# Patient Record
Sex: MALE | Race: WHITE | NOT HISPANIC OR LATINO | URBAN - METROPOLITAN AREA
[De-identification: names, ages, dates, MRNs, and addresses within clinical notes are randomized per-mention and may not be internally consistent; named-entity substitution may affect disease eponyms.]

---

## 2018-11-23 ENCOUNTER — INPATIENT (INPATIENT)
Facility: HOSPITAL | Age: 58
LOS: 19 days | Discharge: NURSING, REHAB OR LONG-TERM CARE | End: 2018-12-13
Attending: INTERNAL MEDICINE | Admitting: INTERNAL MEDICINE
Payer: MEDICARE

## 2018-11-23 VITALS — OXYGEN SATURATION: 100 % | HEART RATE: 113 BPM

## 2018-11-23 DIAGNOSIS — I26.99 OTHER PULMONARY EMBOLISM WITHOUT ACUTE COR PULMONALE: ICD-10-CM

## 2018-11-23 DIAGNOSIS — R09.89 OTHER SPECIFIED SYMPTOMS AND SIGNS INVOLVING THE CIRCULATORY AND RESPIRATORY SYSTEMS: ICD-10-CM

## 2018-11-23 LAB
ALBUMIN SERPL ELPH-MCNC: 3.6 G/DL — SIGNIFICANT CHANGE UP (ref 3.5–5.2)
ALBUMIN SERPL ELPH-MCNC: 3.6 G/DL — SIGNIFICANT CHANGE UP (ref 3.5–5.2)
ALBUMIN SERPL ELPH-MCNC: 4.4 G/DL — SIGNIFICANT CHANGE UP (ref 3.5–5.2)
ALP SERPL-CCNC: 159 U/L — HIGH (ref 30–115)
ALP SERPL-CCNC: 160 U/L — HIGH (ref 30–115)
ALP SERPL-CCNC: 203 U/L — HIGH (ref 30–115)
ALT FLD-CCNC: 18 U/L — SIGNIFICANT CHANGE UP (ref 0–41)
ALT FLD-CCNC: 57 U/L — HIGH (ref 0–41)
ALT FLD-CCNC: 66 U/L — HIGH (ref 0–41)
ANION GAP SERPL CALC-SCNC: 20 MMOL/L — HIGH (ref 7–14)
ANION GAP SERPL CALC-SCNC: 20 MMOL/L — HIGH (ref 7–14)
ANION GAP SERPL CALC-SCNC: 21 MMOL/L — HIGH (ref 7–14)
ANION GAP SERPL CALC-SCNC: 37 MMOL/L — HIGH (ref 7–14)
APAP SERPL-MCNC: <5 UG/ML — LOW (ref 10–30)
APPEARANCE UR: ABNORMAL
APTT BLD: 39.5 SEC — HIGH (ref 27–39.2)
APTT BLD: 63.2 SEC — HIGH (ref 27–39.2)
APTT BLD: 66.5 SEC — HIGH (ref 27–39.2)
AST SERPL-CCNC: 33 U/L — SIGNIFICANT CHANGE UP (ref 0–41)
AST SERPL-CCNC: 58 U/L — HIGH (ref 0–41)
AST SERPL-CCNC: 72 U/L — HIGH (ref 0–41)
BASE EXCESS BLDA CALC-SCNC: -14 MMOL/L — LOW (ref -2–2)
BASOPHILS # BLD AUTO: 0 K/UL — SIGNIFICANT CHANGE UP (ref 0–0.2)
BASOPHILS # BLD AUTO: 0.04 K/UL — SIGNIFICANT CHANGE UP (ref 0–0.2)
BASOPHILS # BLD AUTO: 0.04 K/UL — SIGNIFICANT CHANGE UP (ref 0–0.2)
BASOPHILS NFR BLD AUTO: 0 % — SIGNIFICANT CHANGE UP (ref 0–1)
BASOPHILS NFR BLD AUTO: 0.1 % — SIGNIFICANT CHANGE UP (ref 0–1)
BASOPHILS NFR BLD AUTO: 0.1 % — SIGNIFICANT CHANGE UP (ref 0–1)
BILIRUB DIRECT SERPL-MCNC: <0.2 MG/DL — SIGNIFICANT CHANGE UP (ref 0–0.2)
BILIRUB INDIRECT FLD-MCNC: >0.2 MG/DL — SIGNIFICANT CHANGE UP (ref 0.2–1.2)
BILIRUB SERPL-MCNC: 0.3 MG/DL — SIGNIFICANT CHANGE UP (ref 0.2–1.2)
BILIRUB SERPL-MCNC: 0.4 MG/DL — SIGNIFICANT CHANGE UP (ref 0.2–1.2)
BILIRUB SERPL-MCNC: 0.7 MG/DL — SIGNIFICANT CHANGE UP (ref 0.2–1.2)
BILIRUB UR-MCNC: NEGATIVE — SIGNIFICANT CHANGE UP
BUN SERPL-MCNC: 40 MG/DL — HIGH (ref 10–20)
BUN SERPL-MCNC: 42 MG/DL — HIGH (ref 10–20)
BUN SERPL-MCNC: 45 MG/DL — HIGH (ref 10–20)
BUN SERPL-MCNC: 45 MG/DL — HIGH (ref 10–20)
CALCIUM SERPL-MCNC: 10.2 MG/DL — HIGH (ref 8.5–10.1)
CALCIUM SERPL-MCNC: 8.7 MG/DL — SIGNIFICANT CHANGE UP (ref 8.5–10.1)
CALCIUM SERPL-MCNC: 8.8 MG/DL — SIGNIFICANT CHANGE UP (ref 8.5–10.1)
CALCIUM SERPL-MCNC: 8.8 MG/DL — SIGNIFICANT CHANGE UP (ref 8.5–10.1)
CHLORIDE SERPL-SCNC: 85 MMOL/L — LOW (ref 98–110)
CHLORIDE SERPL-SCNC: 91 MMOL/L — LOW (ref 98–110)
CHLORIDE SERPL-SCNC: 92 MMOL/L — LOW (ref 98–110)
CHLORIDE SERPL-SCNC: 93 MMOL/L — LOW (ref 98–110)
CK MB CFR SERPL CALC: 2.1 NG/ML — SIGNIFICANT CHANGE UP (ref 0.6–6.3)
CK SERPL-CCNC: 94 U/L — SIGNIFICANT CHANGE UP (ref 0–225)
CO2 SERPL-SCNC: 10 MMOL/L — LOW (ref 17–32)
CO2 SERPL-SCNC: 20 MMOL/L — SIGNIFICANT CHANGE UP (ref 17–32)
CO2 SERPL-SCNC: 21 MMOL/L — SIGNIFICANT CHANGE UP (ref 17–32)
CO2 SERPL-SCNC: 22 MMOL/L — SIGNIFICANT CHANGE UP (ref 17–32)
COLOR SPEC: SIGNIFICANT CHANGE UP
CREAT SERPL-MCNC: 2.3 MG/DL — HIGH (ref 0.7–1.5)
CREAT SERPL-MCNC: 2.4 MG/DL — HIGH (ref 0.7–1.5)
CREAT SERPL-MCNC: 2.4 MG/DL — HIGH (ref 0.7–1.5)
CREAT SERPL-MCNC: 3.1 MG/DL — HIGH (ref 0.7–1.5)
DIFF PNL FLD: ABNORMAL
EOSINOPHIL # BLD AUTO: 0 K/UL — SIGNIFICANT CHANGE UP (ref 0–0.7)
EOSINOPHIL # BLD AUTO: 0 K/UL — SIGNIFICANT CHANGE UP (ref 0–0.7)
EOSINOPHIL # BLD AUTO: 0.28 K/UL — SIGNIFICANT CHANGE UP (ref 0–0.7)
EOSINOPHIL NFR BLD AUTO: 0 % — SIGNIFICANT CHANGE UP (ref 0–8)
EOSINOPHIL NFR BLD AUTO: 0 % — SIGNIFICANT CHANGE UP (ref 0–8)
EOSINOPHIL NFR BLD AUTO: 0.9 % — SIGNIFICANT CHANGE UP (ref 0–8)
ETHANOL SERPL-MCNC: <10 MG/DL — HIGH
FIBRINOGEN PPP-MCNC: 492 MG/DL — SIGNIFICANT CHANGE UP (ref 204.4–570.6)
GAS PNL BLDA: SIGNIFICANT CHANGE UP
GAS PNL BLDV: SIGNIFICANT CHANGE UP
GLUCOSE BLDC GLUCOMTR-MCNC: 148 MG/DL — HIGH (ref 70–99)
GLUCOSE BLDC GLUCOMTR-MCNC: 158 MG/DL — HIGH (ref 70–99)
GLUCOSE BLDC GLUCOMTR-MCNC: 235 MG/DL — HIGH (ref 70–99)
GLUCOSE SERPL-MCNC: 106 MG/DL — HIGH (ref 70–99)
GLUCOSE SERPL-MCNC: 108 MG/DL — HIGH (ref 70–99)
GLUCOSE SERPL-MCNC: 128 MG/DL — HIGH (ref 70–99)
GLUCOSE SERPL-MCNC: 321 MG/DL — HIGH (ref 70–99)
GLUCOSE UR QL: NEGATIVE — SIGNIFICANT CHANGE UP
HCO3 BLDA-SCNC: 14 MMOL/L — LOW (ref 23–27)
HCT VFR BLD CALC: 31.8 % — LOW (ref 42–52)
HCT VFR BLD CALC: 32.5 % — LOW (ref 42–52)
HCT VFR BLD CALC: 33.1 % — LOW (ref 42–52)
HCT VFR BLD CALC: 41.8 % — LOW (ref 42–52)
HGB BLD-MCNC: 10.7 G/DL — LOW (ref 14–18)
HGB BLD-MCNC: 10.9 G/DL — LOW (ref 14–18)
HGB BLD-MCNC: 11.1 G/DL — LOW (ref 14–18)
HGB BLD-MCNC: 13.2 G/DL — LOW (ref 14–18)
IMM GRANULOCYTES NFR BLD AUTO: 1 % — HIGH (ref 0.1–0.3)
IMM GRANULOCYTES NFR BLD AUTO: 1.2 % — HIGH (ref 0.1–0.3)
INR BLD: 1.23 RATIO — SIGNIFICANT CHANGE UP (ref 0.65–1.3)
KETONES UR-MCNC: ABNORMAL
LACTATE SERPL-SCNC: 3.3 MMOL/L — HIGH (ref 0.5–2.2)
LEUKOCYTE ESTERASE UR-ACNC: NEGATIVE — SIGNIFICANT CHANGE UP
LIDOCAIN IGE QN: 11 U/L — SIGNIFICANT CHANGE UP (ref 7–60)
LYMPHOCYTES # BLD AUTO: 0.28 K/UL — LOW (ref 1.2–3.4)
LYMPHOCYTES # BLD AUTO: 0.9 % — LOW (ref 20.5–51.1)
LYMPHOCYTES # BLD AUTO: 10.3 % — LOW (ref 20.5–51.1)
LYMPHOCYTES # BLD AUTO: 2.04 K/UL — SIGNIFICANT CHANGE UP (ref 1.2–3.4)
LYMPHOCYTES # BLD AUTO: 2.77 K/UL — SIGNIFICANT CHANGE UP (ref 1.2–3.4)
LYMPHOCYTES # BLD AUTO: 7.4 % — LOW (ref 20.5–51.1)
MAGNESIUM SERPL-MCNC: 2.1 MG/DL — SIGNIFICANT CHANGE UP (ref 1.8–2.4)
MAGNESIUM SERPL-MCNC: 2.8 MG/DL — HIGH (ref 1.8–2.4)
MCHC RBC-ENTMCNC: 29.6 PG — SIGNIFICANT CHANGE UP (ref 27–31)
MCHC RBC-ENTMCNC: 29.7 PG — SIGNIFICANT CHANGE UP (ref 27–31)
MCHC RBC-ENTMCNC: 29.8 PG — SIGNIFICANT CHANGE UP (ref 27–31)
MCHC RBC-ENTMCNC: 29.8 PG — SIGNIFICANT CHANGE UP (ref 27–31)
MCHC RBC-ENTMCNC: 31.6 G/DL — LOW (ref 32–37)
MCHC RBC-ENTMCNC: 33.5 G/DL — SIGNIFICANT CHANGE UP (ref 32–37)
MCHC RBC-ENTMCNC: 33.5 G/DL — SIGNIFICANT CHANGE UP (ref 32–37)
MCHC RBC-ENTMCNC: 33.6 G/DL — SIGNIFICANT CHANGE UP (ref 32–37)
MCV RBC AUTO: 88.1 FL — SIGNIFICANT CHANGE UP (ref 80–94)
MCV RBC AUTO: 88.8 FL — SIGNIFICANT CHANGE UP (ref 80–94)
MCV RBC AUTO: 89 FL — SIGNIFICANT CHANGE UP (ref 80–94)
MCV RBC AUTO: 93.9 FL — SIGNIFICANT CHANGE UP (ref 80–94)
MONOCYTES # BLD AUTO: 1.07 K/UL — HIGH (ref 0.1–0.6)
MONOCYTES # BLD AUTO: 2.61 K/UL — HIGH (ref 0.1–0.6)
MONOCYTES # BLD AUTO: 2.73 K/UL — HIGH (ref 0.1–0.6)
MONOCYTES NFR BLD AUTO: 10.2 % — HIGH (ref 1.7–9.3)
MONOCYTES NFR BLD AUTO: 3.4 % — SIGNIFICANT CHANGE UP (ref 1.7–9.3)
MONOCYTES NFR BLD AUTO: 9.5 % — HIGH (ref 1.7–9.3)
NEUTROPHILS # BLD AUTO: 21.05 K/UL — HIGH (ref 1.4–6.5)
NEUTROPHILS # BLD AUTO: 22.52 K/UL — HIGH (ref 1.4–6.5)
NEUTROPHILS # BLD AUTO: 28.89 K/UL — HIGH (ref 1.4–6.5)
NEUTROPHILS NFR BLD AUTO: 78.4 % — HIGH (ref 42.2–75.2)
NEUTROPHILS NFR BLD AUTO: 81.8 % — HIGH (ref 42.2–75.2)
NEUTROPHILS NFR BLD AUTO: 88 % — HIGH (ref 42.2–75.2)
NITRITE UR-MCNC: NEGATIVE — SIGNIFICANT CHANGE UP
NRBC # BLD: 0 /100 WBCS — SIGNIFICANT CHANGE UP (ref 0–0)
NRBC # BLD: 0 /100 WBCS — SIGNIFICANT CHANGE UP (ref 0–0)
NT-PROBNP SERPL-SCNC: 757 PG/ML — HIGH (ref 0–300)
OSMOLALITY SERPL: 323 MOS/KG — HIGH (ref 289–308)
PCO2 BLDA: 40 MMHG — SIGNIFICANT CHANGE UP (ref 38–42)
PCP SPEC-MCNC: SIGNIFICANT CHANGE UP
PH BLDA: 7.16 — CRITICAL LOW (ref 7.38–7.42)
PH UR: 5 — SIGNIFICANT CHANGE UP (ref 5–8)
PHENYTOIN FREE SERPL-MCNC: 6.8 UG/ML — LOW (ref 10–20)
PHOSPHATE SERPL-MCNC: 7.6 MG/DL — HIGH (ref 2.1–4.9)
PLATELET # BLD AUTO: 165 K/UL — SIGNIFICANT CHANGE UP (ref 130–400)
PLATELET # BLD AUTO: 171 K/UL — SIGNIFICANT CHANGE UP (ref 130–400)
PLATELET # BLD AUTO: 188 K/UL — SIGNIFICANT CHANGE UP (ref 130–400)
PLATELET # BLD AUTO: 229 K/UL — SIGNIFICANT CHANGE UP (ref 130–400)
PO2 BLDA: 498 MMHG — HIGH (ref 78–95)
POTASSIUM SERPL-MCNC: 5 MMOL/L — SIGNIFICANT CHANGE UP (ref 3.5–5)
POTASSIUM SERPL-MCNC: 5.4 MMOL/L — HIGH (ref 3.5–5)
POTASSIUM SERPL-MCNC: 6 MMOL/L — CRITICAL HIGH (ref 3.5–5)
POTASSIUM SERPL-MCNC: 6.1 MMOL/L — CRITICAL HIGH (ref 3.5–5)
POTASSIUM SERPL-SCNC: 5 MMOL/L — SIGNIFICANT CHANGE UP (ref 3.5–5)
POTASSIUM SERPL-SCNC: 5.4 MMOL/L — HIGH (ref 3.5–5)
POTASSIUM SERPL-SCNC: 6 MMOL/L — CRITICAL HIGH (ref 3.5–5)
POTASSIUM SERPL-SCNC: 6.1 MMOL/L — CRITICAL HIGH (ref 3.5–5)
PROT SERPL-MCNC: 6.6 G/DL — SIGNIFICANT CHANGE UP (ref 6–8)
PROT SERPL-MCNC: 6.6 G/DL — SIGNIFICANT CHANGE UP (ref 6–8)
PROT SERPL-MCNC: 8.4 G/DL — HIGH (ref 6–8)
PROT UR-MCNC: 100
PROTHROM AB SERPL-ACNC: 14.1 SEC — HIGH (ref 9.95–12.87)
RBC # BLD: 3.61 M/UL — LOW (ref 4.7–6.1)
RBC # BLD: 3.66 M/UL — LOW (ref 4.7–6.1)
RBC # BLD: 3.72 M/UL — LOW (ref 4.7–6.1)
RBC # BLD: 4.45 M/UL — LOW (ref 4.7–6.1)
RBC # FLD: 13.3 % — SIGNIFICANT CHANGE UP (ref 11.5–14.5)
RBC # FLD: 13.3 % — SIGNIFICANT CHANGE UP (ref 11.5–14.5)
RBC # FLD: 13.5 % — SIGNIFICANT CHANGE UP (ref 11.5–14.5)
RBC # FLD: 13.5 % — SIGNIFICANT CHANGE UP (ref 11.5–14.5)
SALICYLATES SERPL-MCNC: <0.3 MG/DL — LOW (ref 4–30)
SAO2 % BLDA: 100 % — HIGH (ref 94–98)
SODIUM SERPL-SCNC: 132 MMOL/L — LOW (ref 135–146)
SODIUM SERPL-SCNC: 133 MMOL/L — LOW (ref 135–146)
SODIUM SERPL-SCNC: 133 MMOL/L — LOW (ref 135–146)
SODIUM SERPL-SCNC: 134 MMOL/L — LOW (ref 135–146)
SP GR SPEC: >=1.03 — SIGNIFICANT CHANGE UP (ref 1.01–1.03)
TROPONIN T SERPL-MCNC: <0.01 NG/ML — SIGNIFICANT CHANGE UP
TROPONIN T SERPL-MCNC: <0.01 NG/ML — SIGNIFICANT CHANGE UP
TYPE + AB SCN PNL BLD: SIGNIFICANT CHANGE UP
UROBILINOGEN FLD QL: 0.2 — SIGNIFICANT CHANGE UP (ref 0.2–0.2)
WBC # BLD: 25.02 K/UL — HIGH (ref 4.8–10.8)
WBC # BLD: 26.85 K/UL — HIGH (ref 4.8–10.8)
WBC # BLD: 27.53 K/UL — HIGH (ref 4.8–10.8)
WBC # BLD: 31.61 K/UL — HIGH (ref 4.8–10.8)
WBC # FLD AUTO: 25.02 K/UL — HIGH (ref 4.8–10.8)
WBC # FLD AUTO: 26.85 K/UL — HIGH (ref 4.8–10.8)
WBC # FLD AUTO: 27.53 K/UL — HIGH (ref 4.8–10.8)
WBC # FLD AUTO: 31.61 K/UL — HIGH (ref 4.8–10.8)

## 2018-11-23 PROCEDURE — 93970 EXTREMITY STUDY: CPT | Mod: 26

## 2018-11-23 PROCEDURE — 99222 1ST HOSP IP/OBS MODERATE 55: CPT

## 2018-11-23 PROCEDURE — 99223 1ST HOSP IP/OBS HIGH 75: CPT

## 2018-11-23 RX ORDER — VANCOMYCIN HCL 1 G
1250 VIAL (EA) INTRAVENOUS DAILY
Qty: 0 | Refills: 0 | Status: DISCONTINUED | OUTPATIENT
Start: 2018-11-23 | End: 2018-11-28

## 2018-11-23 RX ORDER — MEROPENEM 1 G/30ML
INJECTION INTRAVENOUS
Qty: 0 | Refills: 0 | Status: DISCONTINUED | OUTPATIENT
Start: 2018-11-23 | End: 2018-11-27

## 2018-11-23 RX ORDER — FOLIC ACID 0.8 MG
1 TABLET ORAL
Qty: 0 | Refills: 0 | COMMUNITY

## 2018-11-23 RX ORDER — PROPOFOL 10 MG/ML
5 INJECTION, EMULSION INTRAVENOUS
Qty: 1000 | Refills: 0 | Status: DISCONTINUED | OUTPATIENT
Start: 2018-11-23 | End: 2018-11-25

## 2018-11-23 RX ORDER — PANTOPRAZOLE SODIUM 20 MG/1
40 TABLET, DELAYED RELEASE ORAL
Qty: 0 | Refills: 0 | Status: DISCONTINUED | OUTPATIENT
Start: 2018-11-23 | End: 2018-11-23

## 2018-11-23 RX ORDER — FENTANYL CITRATE 50 UG/ML
0.5 INJECTION INTRAVENOUS
Qty: 2500 | Refills: 0 | Status: DISCONTINUED | OUTPATIENT
Start: 2018-11-23 | End: 2018-11-25

## 2018-11-23 RX ORDER — MEROPENEM 1 G/30ML
500 INJECTION INTRAVENOUS EVERY 12 HOURS
Qty: 0 | Refills: 0 | Status: DISCONTINUED | OUTPATIENT
Start: 2018-11-23 | End: 2018-11-27

## 2018-11-23 RX ORDER — INSULIN HUMAN 100 [IU]/ML
10 INJECTION, SOLUTION SUBCUTANEOUS ONCE
Qty: 0 | Refills: 0 | Status: COMPLETED | OUTPATIENT
Start: 2018-11-23 | End: 2018-11-23

## 2018-11-23 RX ORDER — DEXTROSE 50 % IN WATER 50 %
50 SYRINGE (ML) INTRAVENOUS ONCE
Qty: 0 | Refills: 0 | Status: COMPLETED | OUTPATIENT
Start: 2018-11-23 | End: 2018-11-23

## 2018-11-23 RX ORDER — MEROPENEM 1 G/30ML
500 INJECTION INTRAVENOUS ONCE
Qty: 0 | Refills: 0 | Status: COMPLETED | OUTPATIENT
Start: 2018-11-23 | End: 2018-11-23

## 2018-11-23 RX ORDER — ATORVASTATIN CALCIUM 80 MG/1
1 TABLET, FILM COATED ORAL
Qty: 0 | Refills: 0 | COMMUNITY

## 2018-11-23 RX ORDER — SODIUM CHLORIDE 9 MG/ML
2600 INJECTION, SOLUTION INTRAVENOUS ONCE
Qty: 0 | Refills: 0 | Status: COMPLETED | OUTPATIENT
Start: 2018-11-23 | End: 2018-11-23

## 2018-11-23 RX ORDER — PANTOPRAZOLE SODIUM 20 MG/1
40 TABLET, DELAYED RELEASE ORAL ONCE
Qty: 0 | Refills: 0 | Status: COMPLETED | OUTPATIENT
Start: 2018-11-23 | End: 2018-11-23

## 2018-11-23 RX ORDER — FENTANYL CITRATE 50 UG/ML
75 INJECTION INTRAVENOUS ONCE
Qty: 0 | Refills: 0 | Status: DISCONTINUED | OUTPATIENT
Start: 2018-11-23 | End: 2018-11-23

## 2018-11-23 RX ORDER — SODIUM BICARBONATE 1 MEQ/ML
0.18 SYRINGE (ML) INTRAVENOUS
Qty: 150 | Refills: 0 | Status: DISCONTINUED | OUTPATIENT
Start: 2018-11-23 | End: 2018-11-23

## 2018-11-23 RX ORDER — SUCCINYLCHOLINE CHLORIDE 100 MG/5ML
100 SYRINGE (ML) INTRAVENOUS ONCE
Qty: 0 | Refills: 0 | Status: COMPLETED | OUTPATIENT
Start: 2018-11-23 | End: 2018-11-23

## 2018-11-23 RX ORDER — HEPARIN SODIUM 5000 [USP'U]/ML
INJECTION INTRAVENOUS; SUBCUTANEOUS
Qty: 25000 | Refills: 0 | Status: DISCONTINUED | OUTPATIENT
Start: 2018-11-23 | End: 2018-11-23

## 2018-11-23 RX ORDER — HEPARIN SODIUM 5000 [USP'U]/ML
1500 INJECTION INTRAVENOUS; SUBCUTANEOUS
Qty: 25000 | Refills: 0 | Status: DISCONTINUED | OUTPATIENT
Start: 2018-11-23 | End: 2018-11-24

## 2018-11-23 RX ORDER — KETAMINE HYDROCHLORIDE 100 MG/ML
100 INJECTION INTRAMUSCULAR; INTRAVENOUS ONCE
Qty: 0 | Refills: 0 | Status: DISCONTINUED | OUTPATIENT
Start: 2018-11-23 | End: 2018-11-23

## 2018-11-23 RX ORDER — CHLORHEXIDINE GLUCONATE 213 G/1000ML
1 SOLUTION TOPICAL
Qty: 0 | Refills: 0 | Status: DISCONTINUED | OUTPATIENT
Start: 2018-11-23 | End: 2018-12-13

## 2018-11-23 RX ORDER — SODIUM BICARBONATE 1 MEQ/ML
650 SYRINGE (ML) INTRAVENOUS THREE TIMES A DAY
Qty: 0 | Refills: 0 | Status: DISCONTINUED | OUTPATIENT
Start: 2018-11-23 | End: 2018-11-30

## 2018-11-23 RX ORDER — VANCOMYCIN HCL 1 G
1000 VIAL (EA) INTRAVENOUS ONCE
Qty: 0 | Refills: 0 | Status: COMPLETED | OUTPATIENT
Start: 2018-11-23 | End: 2018-11-23

## 2018-11-23 RX ORDER — SODIUM BICARBONATE 1 MEQ/ML
50 SYRINGE (ML) INTRAVENOUS ONCE
Qty: 0 | Refills: 0 | Status: COMPLETED | OUTPATIENT
Start: 2018-11-23 | End: 2018-11-23

## 2018-11-23 RX ORDER — HEPARIN SODIUM 5000 [USP'U]/ML
6500 INJECTION INTRAVENOUS; SUBCUTANEOUS ONCE
Qty: 0 | Refills: 0 | Status: DISCONTINUED | OUTPATIENT
Start: 2018-11-23 | End: 2018-11-23

## 2018-11-23 RX ORDER — FENTANYL CITRATE 50 UG/ML
50 INJECTION INTRAVENOUS ONCE
Qty: 0 | Refills: 0 | Status: DISCONTINUED | OUTPATIENT
Start: 2018-11-23 | End: 2018-11-23

## 2018-11-23 RX ORDER — ATORVASTATIN CALCIUM 80 MG/1
40 TABLET, FILM COATED ORAL AT BEDTIME
Qty: 0 | Refills: 0 | Status: DISCONTINUED | OUTPATIENT
Start: 2018-11-23 | End: 2018-11-30

## 2018-11-23 RX ORDER — LEVETIRACETAM 250 MG/1
1500 TABLET, FILM COATED ORAL EVERY 12 HOURS
Qty: 0 | Refills: 0 | Status: DISCONTINUED | OUTPATIENT
Start: 2018-11-23 | End: 2018-11-26

## 2018-11-23 RX ORDER — PANTOPRAZOLE SODIUM 20 MG/1
8 TABLET, DELAYED RELEASE ORAL
Qty: 80 | Refills: 0 | Status: DISCONTINUED | OUTPATIENT
Start: 2018-11-23 | End: 2018-11-26

## 2018-11-23 RX ORDER — THIAMINE MONONITRATE (VIT B1) 100 MG
100 TABLET ORAL DAILY
Qty: 0 | Refills: 0 | Status: DISCONTINUED | OUTPATIENT
Start: 2018-11-23 | End: 2018-11-26

## 2018-11-23 RX ORDER — CEFEPIME 1 G/1
2000 INJECTION, POWDER, FOR SOLUTION INTRAMUSCULAR; INTRAVENOUS ONCE
Qty: 0 | Refills: 0 | Status: COMPLETED | OUTPATIENT
Start: 2018-11-23 | End: 2018-11-23

## 2018-11-23 RX ORDER — SODIUM CHLORIDE 9 MG/ML
500 INJECTION INTRAMUSCULAR; INTRAVENOUS; SUBCUTANEOUS ONCE
Qty: 0 | Refills: 0 | Status: COMPLETED | OUTPATIENT
Start: 2018-11-23 | End: 2018-11-23

## 2018-11-23 RX ORDER — CHLORHEXIDINE GLUCONATE 213 G/1000ML
15 SOLUTION TOPICAL EVERY 12 HOURS
Qty: 0 | Refills: 0 | Status: DISCONTINUED | OUTPATIENT
Start: 2018-11-23 | End: 2018-11-25

## 2018-11-23 RX ORDER — THIAMINE MONONITRATE (VIT B1) 100 MG
1 TABLET ORAL
Qty: 0 | Refills: 0 | COMMUNITY

## 2018-11-23 RX ORDER — FOLIC ACID 0.8 MG
1 TABLET ORAL DAILY
Qty: 0 | Refills: 0 | Status: DISCONTINUED | OUTPATIENT
Start: 2018-11-23 | End: 2018-12-13

## 2018-11-23 RX ADMIN — CEFEPIME 2000 MILLIGRAM(S): 1 INJECTION, POWDER, FOR SOLUTION INTRAMUSCULAR; INTRAVENOUS at 04:00

## 2018-11-23 RX ADMIN — SODIUM CHLORIDE 5200 MILLILITER(S): 9 INJECTION, SOLUTION INTRAVENOUS at 00:23

## 2018-11-23 RX ADMIN — ATORVASTATIN CALCIUM 40 MILLIGRAM(S): 80 TABLET, FILM COATED ORAL at 21:29

## 2018-11-23 RX ADMIN — Medication 250 MILLIGRAM(S): at 06:46

## 2018-11-23 RX ADMIN — Medication 108 MILLIGRAM(S): at 17:27

## 2018-11-23 RX ADMIN — MEROPENEM 100 MILLIGRAM(S): 1 INJECTION INTRAVENOUS at 17:08

## 2018-11-23 RX ADMIN — Medication 50 MILLIEQUIVALENT(S): at 05:04

## 2018-11-23 RX ADMIN — PROPOFOL 2.55 MICROGRAM(S)/KG/MIN: 10 INJECTION, EMULSION INTRAVENOUS at 02:22

## 2018-11-23 RX ADMIN — LEVETIRACETAM 400 MILLIGRAM(S): 250 TABLET, FILM COATED ORAL at 06:45

## 2018-11-23 RX ADMIN — PANTOPRAZOLE SODIUM 40 MILLIGRAM(S): 20 TABLET, DELAYED RELEASE ORAL at 06:45

## 2018-11-23 RX ADMIN — HEPARIN SODIUM 15 UNIT(S)/HR: 5000 INJECTION INTRAVENOUS; SUBCUTANEOUS at 12:20

## 2018-11-23 RX ADMIN — FENTANYL CITRATE 50 MICROGRAM(S): 50 INJECTION INTRAVENOUS at 00:22

## 2018-11-23 RX ADMIN — Medication 50 MILLILITER(S): at 09:04

## 2018-11-23 RX ADMIN — PANTOPRAZOLE SODIUM 10 MG/HR: 20 TABLET, DELAYED RELEASE ORAL at 16:35

## 2018-11-23 RX ADMIN — FENTANYL CITRATE 4.25 MICROGRAM(S)/KG/HR: 50 INJECTION INTRAVENOUS at 17:14

## 2018-11-23 RX ADMIN — CEFEPIME 100 MILLIGRAM(S): 1 INJECTION, POWDER, FOR SOLUTION INTRAMUSCULAR; INTRAVENOUS at 00:50

## 2018-11-23 RX ADMIN — PROPOFOL 2.55 MICROGRAM(S)/KG/MIN: 10 INJECTION, EMULSION INTRAVENOUS at 21:30

## 2018-11-23 RX ADMIN — PANTOPRAZOLE SODIUM 10 MG/HR: 20 TABLET, DELAYED RELEASE ORAL at 10:46

## 2018-11-23 RX ADMIN — Medication 1 MILLIGRAM(S): at 11:42

## 2018-11-23 RX ADMIN — MEROPENEM 100 MILLIGRAM(S): 1 INJECTION INTRAVENOUS at 07:09

## 2018-11-23 RX ADMIN — CHLORHEXIDINE GLUCONATE 15 MILLILITER(S): 213 SOLUTION TOPICAL at 17:08

## 2018-11-23 RX ADMIN — FENTANYL CITRATE 75 MICROGRAM(S): 50 INJECTION INTRAVENOUS at 04:13

## 2018-11-23 RX ADMIN — Medication 650 MILLIGRAM(S): at 21:29

## 2018-11-23 RX ADMIN — FENTANYL CITRATE 50 MICROGRAM(S): 50 INJECTION INTRAVENOUS at 01:00

## 2018-11-23 RX ADMIN — Medication 108 MILLIGRAM(S): at 06:46

## 2018-11-23 RX ADMIN — Medication 108 MILLIGRAM(S): at 13:03

## 2018-11-23 RX ADMIN — LEVETIRACETAM 400 MILLIGRAM(S): 250 TABLET, FILM COATED ORAL at 17:02

## 2018-11-23 RX ADMIN — Medication 650 MILLIGRAM(S): at 13:55

## 2018-11-23 RX ADMIN — SODIUM CHLORIDE 2600 MILLILITER(S): 9 INJECTION, SOLUTION INTRAVENOUS at 02:50

## 2018-11-23 RX ADMIN — Medication 100 MEQ/KG/HR: at 07:46

## 2018-11-23 RX ADMIN — SODIUM CHLORIDE 1000 MILLILITER(S): 9 INJECTION INTRAMUSCULAR; INTRAVENOUS; SUBCUTANEOUS at 13:21

## 2018-11-23 RX ADMIN — CHLORHEXIDINE GLUCONATE 1 APPLICATION(S): 213 SOLUTION TOPICAL at 10:15

## 2018-11-23 RX ADMIN — KETAMINE HYDROCHLORIDE 100 MILLIGRAM(S): 100 INJECTION INTRAMUSCULAR; INTRAVENOUS at 01:00

## 2018-11-23 RX ADMIN — Medication 100 MILLIGRAM(S): at 11:42

## 2018-11-23 RX ADMIN — PROPOFOL 2.55 MICROGRAM(S)/KG/MIN: 10 INJECTION, EMULSION INTRAVENOUS at 15:57

## 2018-11-23 RX ADMIN — FENTANYL CITRATE 75 MICROGRAM(S): 50 INJECTION INTRAVENOUS at 03:00

## 2018-11-23 RX ADMIN — Medication 100 MILLIGRAM(S): at 01:00

## 2018-11-23 RX ADMIN — PROPOFOL 2.55 MICROGRAM(S)/KG/MIN: 10 INJECTION, EMULSION INTRAVENOUS at 07:59

## 2018-11-23 RX ADMIN — INSULIN HUMAN 10 UNIT(S): 100 INJECTION, SOLUTION SUBCUTANEOUS at 09:04

## 2018-11-23 NOTE — CONSULT NOTE ADULT - SUBJECTIVE AND OBJECTIVE BOX
Patient is a 57y old  Male who presents with a chief complaint of sob and fall (23 Nov 2018 07:42)      HPI:  57M, PMHx of seizure disorder, DLD, presents to the ED sp fall from NH. Pt was brought in by EMS from his nursing home due to a fall and then experienced SOB. It is unclear which event happened first, either the SOB or fall. During assessment patient was is respiratory distress and hypoxic to 80s on RA and tachypneic. Pt reports b/l lateral chest pain and difficulty breathing. Pt doesn't know how he fell, denies HT/LOC. Pt was unable to provide further history due to intubation    Pt was intubated in the ED.  In ed, he was found to have b/l pe without strain, melina with HAGMA and lactic acidosis, coffee ground emesis. (23 Nov 2018 04:26), PROPOFOL/ FENTANYL/ NO IV HEPARIN, S/P DISLODGE ETT, BICARB D5 W 3  CC/H, STAGE 4 ULCER, CT BRAIN INFARCT      PAST MEDICAL & SURGICAL HISTORY:  HLD (hyperlipidemia)  HTN (hypertension)  Seizures  Unilateral inguinal hernia with obstruction and without gangrene, recurrence not specified  No significant past surgical history      SOCIAL HX:   Smoking  NEG    FAMILY HISTORY:  No pertinent family history in first degree relatives      REVIEW OF SYSTEMS HPI          Allergies    No Known Allergies    Intolerances    Seafood (Unknown)      atorvastatin 40 milliGRAM(s) Oral at bedtime  dextrose 50% Injectable 50 milliLiter(s) IV Push once  fentaNYL   Infusion 0.5 MICROgram(s)/kG/Hr IV Continuous <Continuous>  folic acid 1 milliGRAM(s) Oral daily  insulin regular  human recombinant. 10 Unit(s) IV Push once  levETIRAcetam  IVPB 1500 milliGRAM(s) IV Intermittent every 12 hours  meropenem  IVPB      meropenem  IVPB 500 milliGRAM(s) IV Intermittent every 12 hours  pantoprazole  Injectable 40 milliGRAM(s) IV Push two times a day  phenytoin  IVPB 200 milliGRAM(s) IV Intermittent four times a day  propofol Infusion 5 MICROgram(s)/kG/Min IV Continuous <Continuous>  sodium bicarbonate  Infusion 0.176 mEq/kG/Hr IV Continuous <Continuous>  thiamine Injectable 100 milliGRAM(s) IV Push daily  vancomycin  IVPB 1250 milliGRAM(s) IV Intermittent daily  : Home Meds:      PHYSICAL EXAM    ICU Vital Signs Last 24 Hrs  T(C): 36.3 (23 Nov 2018 06:05), Max: 37.6 (23 Nov 2018 00:16)  T(F): 97.3 (23 Nov 2018 06:05), Max: 99.7 (23 Nov 2018 00:16)  HR: 92 (23 Nov 2018 08:00) (92 - 122)  BP: 113/84 (23 Nov 2018 08:00) (102/76 - 136/82)  BP(mean): 97 (23 Nov 2018 08:00) (92 - 105)  RR: 24 (23 Nov 2018 08:00) (14 - 26)  SpO2: 96% (23 Nov 2018 08:00) (89% - 100%)      General:  HEENT:  SEDATED/ WITHDRAW TO PAINFUL STIMULI  Lymph Nodes: No cervical LN   Lungs: Bilateral BS  Cardiovascular: Regular  Abdomen: Soft, Positive BS  Extremities: No clubbing, MOTTLED/ LE EXTREMITIES, SWOLLEN  Skin: Warm  Neurological: sdated      11-22-18 @ 07:01  -  11-23-18 @ 07:00  --------------------------------------------------------  IN:    IV PiggyBack: 300 mL  Total IN: 300 mL    OUT:  Total OUT: 0 mL    Total NET: 300 mL          LABS:                          11.1   27.53 )-----------( 171      ( 23 Nov 2018 05:06 )             33.1                                               11-23    133<L>  |  92<L>  |  42<H>  ----------------------------<  108<H>  6.0<HH>   |  20  |  2.4<H>    Ca    8.8      23 Nov 2018 05:06  Phos  7.6     11-23  Mg     2.1     11-23    TPro  6.6  /  Alb  3.6  /  TBili  0.4  /  DBili  <0.2  /  AST  58<H>  /  ALT  57<H>  /  AlkPhos  160<H>  11-23      PT/INR - ( 23 Nov 2018 05:06 )   PT: 14.10 sec;   INR: 1.23 ratio         PTT - ( 23 Nov 2018 00:15 )  PTT:39.5 sec                                       Urinalysis Basic - ( 23 Nov 2018 04:30 )    Color: Dark Yellow / Appearance: Cloudy / SG: >=1.030 / pH: x  Gluc: x / Ketone: Trace  / Bili: Negative / Urobili: 0.2   Blood: x / Protein: 100 / Nitrite: Negative   Leuk Esterase: Negative / RBC: 6-10 /HPF / WBC x   Sq Epi: x / Non Sq Epi: x / Bacteria: Moderate /HPF        CARDIAC MARKERS ( 23 Nov 2018 05:06 )  x     / <0.01 ng/mL / 94 U/L / x     / 2.1 ng/mL  CARDIAC MARKERS ( 23 Nov 2018 00:15 )  x     / <0.01 ng/mL / x     / x     / x                                                LIVER FUNCTIONS - ( 23 Nov 2018 05:06 )  Alb: 3.6 g/dL / Pro: 6.6 g/dL / ALK PHOS: 160 U/L / ALT: 57 U/L / AST: 58 U/L / GGT: x                                                                                               Mode: AC/ CMV (Assist Control/ Continuous Mandatory Ventilation)  RR (machine): 14  TV (machine): 450  FiO2: 40  PEEP: 5  ITime: 1  MAP: 7  PIP: 19                                      ABG - ( 23 Nov 2018 07:17 )  pH, Arterial: 7.37  pH, Blood: x     /  pCO2: 33    /  pO2: 159   / HCO3: 19    / Base Excess: -5.7  /  SaO2: 100                 X-Rays                                                                                     ECHO    MEDICATIONS  (STANDING):  atorvastatin 40 milliGRAM(s) Oral at bedtime  dextrose 50% Injectable 50 milliLiter(s) IV Push once  fentaNYL   Infusion 0.5 MICROgram(s)/kG/Hr (4.25 mL/Hr) IV Continuous <Continuous>  folic acid 1 milliGRAM(s) Oral daily  insulin regular  human recombinant. 10 Unit(s) IV Push once  levETIRAcetam  IVPB 1500 milliGRAM(s) IV Intermittent every 12 hours  meropenem  IVPB      meropenem  IVPB 500 milliGRAM(s) IV Intermittent every 12 hours  pantoprazole  Injectable 40 milliGRAM(s) IV Push two times a day  phenytoin  IVPB 200 milliGRAM(s) IV Intermittent four times a day  propofol Infusion 5 MICROgram(s)/kG/Min (2.55 mL/Hr) IV Continuous <Continuous>  sodium bicarbonate  Infusion 0.176 mEq/kG/Hr (100 mL/Hr) IV Continuous <Continuous>  thiamine Injectable 100 milliGRAM(s) IV Push daily  vancomycin  IVPB 1250 milliGRAM(s) IV Intermittent daily    MEDICATIONS  (PRN):

## 2018-11-23 NOTE — CONSULT NOTE ADULT - SUBJECTIVE AND OBJECTIVE BOX
NEPHROLOGY CONSULTATION NOTE    Patient is intubated on vent. hx taken from chart.    Patient is a 57M, PMHx of seizure disorder, DLD, presents to the ED sp fall and SOB from NH. It is unclear which event happened first, either the SOB or fall. During assessment patient was is respiratory distress and hypoxic to 80s on RA and tachypneic. Pt reports b/l lateral chest pain and difficulty breathing. Pt doesn't know how he fell, denies HT/LOC.  Pt was intubated in the ED for airway protection.   In ed, he was found to have b/l PE without strain, melina with HAGMA and lactic acidosis, coffee ground emesis.     PAST MEDICAL & SURGICAL HISTORY:  HLD (hyperlipidemia)  HTN (hypertension)  Seizures  Unilateral inguinal hernia with obstruction and without gangrene, recurrence not specified  No significant past surgical history    Allergies:  No Known Allergies  Seafood (Unknown)    Home Medications Reviewed  Hospital Medications:   MEDICATIONS  (STANDING):  atorvastatin 40 milliGRAM(s) Oral at bedtime  fentaNYL   Infusion 0.5 MICROgram(s)/kG/Hr (4.25 mL/Hr) IV Continuous <Continuous>  folic acid 1 milliGRAM(s) Oral daily  levETIRAcetam  IVPB 1500 milliGRAM(s) IV Intermittent every 12 hours  meropenem  IVPB 500 milliGRAM(s) IV Intermittent every 12 hours  meropenem  IVPB      pantoprazole  Injectable 40 milliGRAM(s) IV Push two times a day  pantoprazole Infusion 8 mG/Hr (10 mL/Hr) IV Continuous <Continuous>  phenytoin  IVPB 200 milliGRAM(s) IV Intermittent four times a day  propofol Infusion 5 MICROgram(s)/kG/Min (2.55 mL/Hr) IV Continuous <Continuous>  sodium bicarbonate 650 milliGRAM(s) Oral three times a day  sodium bicarbonate  Infusion 0.176 mEq/kG/Hr (100 mL/Hr) IV Continuous <Continuous>  thiamine Injectable 100 milliGRAM(s) IV Push daily  vancomycin  IVPB 1250 milliGRAM(s) IV Intermittent daily      SOCIAL HISTORY:  Denies ETOH,Smoking,   FAMILY HISTORY:  No pertinent family history in first degree relatives        REVIEW OF SYSTEMS:    All other review of systems is negative unless indicated above.    VITALS:  T(F): 97.3 (11-23-18 @ 06:05), Max: 99.7 (11-23-18 @ 00:16)  HR: 92 (11-23-18 @ 08:00)  BP: 113/84 (11-23-18 @ 08:00)  RR: 24 (11-23-18 @ 08:00)  SpO2: 96% (11-23-18 @ 08:00)    11-22 @ 07:01  -  11-23 @ 07:00  --------------------------------------------------------  IN: 300 mL / OUT: 0 mL / NET: 300 mL    11-23 @ 07:01  -  11-23 @ 09:19  --------------------------------------------------------  IN: 0 mL / OUT: 60 mL / NET: -60 mL      Height (cm): 190.5 (11-23 @ 06:05)  Weight (kg): 97.6 (11-23 @ 06:05)  BMI (kg/m2): 26.9 (11-23 @ 06:05)  BSA (m2): 2.26 (11-23 @ 06:05)    11-23-18 @ 07:01  -  11-23-18 @ 09:19  --------------------------------------------------------  IN: 0 mL / OUT: 60 mL / NET: -60 mL      I&O's Detail    22 Nov 2018 07:01  -  23 Nov 2018 07:00  --------------------------------------------------------  IN:    IV PiggyBack: 300 mL  Total IN: 300 mL    OUT:  Total OUT: 0 mL    Total NET: 300 mL      23 Nov 2018 07:01 - 23 Nov 2018 09:19  --------------------------------------------------------  IN:  Total IN: 0 mL    OUT:    Indwelling Catheter - Urethral: 60 mL  Total OUT: 60 mL    Total NET: -60 mL        Creatine Kinase, Serum: 94 U/L (11-23-18 @ 05:06)      PHYSICAL EXAM:  Constitutional: Pt is intubated, on vent  Respiratory: CTAB, no wheezes, rales or rhonchi  Cardiovascular: S1, S2, RRR  Extremities: 1+ peripheral edema  Neurological: A/O x 0  : mckeon in place     Vascular Access:    LABS:  11-23    133<L>  |  92<L>  |  42<H>  ----------------------------<  108<H>  6.0<HH>   |  20  |  2.4<H>    Ca    8.8      23 Nov 2018 05:06  Phos  7.6     11-23  Mg     2.1     11-23    TPro  6.6  /  Alb  3.6  /  TBili  0.4  /  DBili  <0.2  /  AST  58<H>  /  ALT  57<H>  /  AlkPhos  160<H>  11-23    Creatinine Trend: 2.4 <--, 3.1 <--                        11.1   27.53 )-----------( 171      ( 23 Nov 2018 05:06 )             33.1     Blood Gas Profile - Arterial (11.23.18 @ 07:17)    pH, Arterial: 7.37: FIO2:40  MODE:_AC   VT:450_   RATE:_14   PEEP:5_  Comment:_    pCO2, Arterial: 33 mmHg    pO2, Arterial: 159 mmHg    HCO3, Arterial: 19 mmoL/L    Base Excess, Arterial: -5.7 mmoL/L    Oxygen Saturation, Arterial: 100 %    FIO2, Arterial: 40    Blood Gas Arterial, Lactate: 1.8 mmoL/L (11.23.18 @ 07:17)      Urine Studies:  Urinalysis Basic - ( 23 Nov 2018 04:30 )    Color: Dark Yellow / Appearance: Cloudy / SG: >=1.030 / pH:   Gluc:  / Ketone: Trace  / Bili: Negative / Urobili: 0.2   Blood:  / Protein: 100 / Nitrite: Negative   Leuk Esterase: Negative / RBC: 6-10 /HPF / WBC    Sq Epi:  / Non Sq Epi:  / Bacteria: Moderate /HPF          RADIOLOGY & ADDITIONAL STUDIES:  < from: Xray Chest 1 View-PORTABLE IMMEDIATE (11.23.18 @ 06:24) >  Impression:      No radiographic evidence of acute cardiopulmonary disease.    Support devices as described.    < end of copied text >    < from: CT Angio Chest w/ IV Cont (11.23.18 @ 02:08) >  IMPRESSION:     1. Multiple bilateral acute pulmonary emboli involving all 5 lobes of the   lungs as above. No definite CT evidence of right heart strain.    2. Compression fractures of T4, T5, T6, and T8 of indeterminate age.   Minimally displaced right L3 transverse process fracture.    3. Severe stenosis at the origin of the left renal artery with   poststenotic dilatation.    < end of copied text > NEPHROLOGY CONSULTATION NOTE    Patient is intubated on vent. hx taken from chart.    Patient is a 57M, PMHx of seizure disorder, DLD, presents to the ED sp fall and SOB from NH. It is unclear which event happened first, either the SOB or fall. During assessment patient was is respiratory distress and hypoxic to 80s on RA and tachypneic. Pt reports b/l lateral chest pain and difficulty breathing. Pt doesn't know how he fell, denies HT/LOC.  Pt was intubated in the ED for airway protection.   In ed, he was found to have b/l PE without strain, melina with HAGMA and lactic acidosis, coffee ground emesis.     PAST MEDICAL & SURGICAL HISTORY:  HLD (hyperlipidemia)  HTN (hypertension)  Seizures  Unilateral inguinal hernia with obstruction and without gangrene, recurrence not specified  No significant past surgical history    Allergies:  No Known Allergies  Seafood (Unknown)    Home Medications Reviewed  Hospital Medications:   MEDICATIONS  (STANDING):  atorvastatin 40 milliGRAM(s) Oral at bedtime  fentaNYL   Infusion 0.5 MICROgram(s)/kG/Hr (4.25 mL/Hr) IV Continuous <Continuous>  folic acid 1 milliGRAM(s) Oral daily  levETIRAcetam  IVPB 1500 milliGRAM(s) IV Intermittent every 12 hours  meropenem  IVPB 500 milliGRAM(s) IV Intermittent every 12 hours  meropenem  IVPB      pantoprazole  Injectable 40 milliGRAM(s) IV Push two times a day  pantoprazole Infusion 8 mG/Hr (10 mL/Hr) IV Continuous <Continuous>  phenytoin  IVPB 200 milliGRAM(s) IV Intermittent four times a day  propofol Infusion 5 MICROgram(s)/kG/Min (2.55 mL/Hr) IV Continuous <Continuous>  sodium bicarbonate 650 milliGRAM(s) Oral three times a day  sodium bicarbonate  Infusion 0.176 mEq/kG/Hr (100 mL/Hr) IV Continuous <Continuous>  thiamine Injectable 100 milliGRAM(s) IV Push daily  vancomycin  IVPB 1250 milliGRAM(s) IV Intermittent daily      SOCIAL HISTORY:  Denies ETOH,Smoking,   FAMILY HISTORY:  No pertinent family history in first degree relatives        REVIEW OF SYSTEMS:    All other review of systems is negative unless indicated above.    VITALS:  T(F): 97.3 (11-23-18 @ 06:05), Max: 99.7 (11-23-18 @ 00:16)  HR: 92 (11-23-18 @ 08:00)  BP: 113/84 (11-23-18 @ 08:00)  RR: 24 (11-23-18 @ 08:00)  SpO2: 96% (11-23-18 @ 08:00)    11-22 @ 07:01  -  11-23 @ 07:00  --------------------------------------------------------  IN: 300 mL / OUT: 0 mL / NET: 300 mL    11-23 @ 07:01  -  11-23 @ 09:19  --------------------------------------------------------  IN: 0 mL / OUT: 60 mL / NET: -60 mL      Height (cm): 190.5 (11-23 @ 06:05)  Weight (kg): 97.6 (11-23 @ 06:05)  BMI (kg/m2): 26.9 (11-23 @ 06:05)  BSA (m2): 2.26 (11-23 @ 06:05)    11-23-18 @ 07:01  -  11-23-18 @ 09:19  --------------------------------------------------------  IN: 0 mL / OUT: 60 mL / NET: -60 mL      I&O's Detail    22 Nov 2018 07:01  -  23 Nov 2018 07:00  --------------------------------------------------------  IN:    IV PiggyBack: 300 mL  Total IN: 300 mL    OUT:  Total OUT: 0 mL    Total NET: 300 mL      23 Nov 2018 07:01 - 23 Nov 2018 09:19  --------------------------------------------------------  IN:  Total IN: 0 mL    OUT:    Indwelling Catheter - Urethral: 60 mL  Total OUT: 60 mL    Total NET: -60 mL      22 Nov 2018 07:01 - 23 Nov 2018 07:00  --------------------------------------------------------  IN:    IV PiggyBack: 300 mL  Total IN: 300 mL    OUT:  Total OUT: 0 mL    Total NET: 300 mL      23 Nov 2018 07:01  -  23 Nov 2018 10:39  --------------------------------------------------------  IN:    propofol Infusion: 59.4 mL    sodium bicarbonate  Infusion: 350 mL  Total IN: 409.4 mL    OUT:    Indwelling Catheter - Urethral: 135 mL  Total OUT: 135 mL    Total NET: 274.4 mL          Creatine Kinase, Serum: 94 U/L (11-23-18 @ 05:06)      PHYSICAL EXAM:  Constitutional: Pt is intubated, on vent  Respiratory: CTAB, no wheezes, rales or rhonchi  Cardiovascular: S1, S2, RRR  Extremities: 1+ peripheral edema  Neurological: A/O x 0  : mckeon in place     Vascular Access:    LABS:  11-23    133<L>  |  92<L>  |  42<H>  ----------------------------<  108<H>  6.0<HH>   |  20  |  2.4<H>    Ca    8.8      23 Nov 2018 05:06  Phos  7.6     11-23  Mg     2.1     11-23    TPro  6.6  /  Alb  3.6  /  TBili  0.4  /  DBili  <0.2  /  AST  58<H>  /  ALT  57<H>  /  AlkPhos  160<H>  11-23    Creatinine Trend: 2.4 <--, 3.1 <--                        11.1   27.53 )-----------( 171      ( 23 Nov 2018 05:06 )             33.1     Blood Gas Profile - Arterial (11.23.18 @ 07:17)    pH, Arterial: 7.37: FIO2:40  MODE:_AC   VT:450_   RATE:_14   PEEP:5_  Comment:_    pCO2, Arterial: 33 mmHg    pO2, Arterial: 159 mmHg    HCO3, Arterial: 19 mmoL/L    Base Excess, Arterial: -5.7 mmoL/L    Oxygen Saturation, Arterial: 100 %    FIO2, Arterial: 40    Blood Gas Arterial, Lactate: 1.8 mmoL/L (11.23.18 @ 07:17)      Urine Studies:  Urinalysis Basic - ( 23 Nov 2018 04:30 )    Color: Dark Yellow / Appearance: Cloudy / SG: >=1.030 / pH:   Gluc:  / Ketone: Trace  / Bili: Negative / Urobili: 0.2   Blood:  / Protein: 100 / Nitrite: Negative   Leuk Esterase: Negative / RBC: 6-10 /HPF / WBC    Sq Epi:  / Non Sq Epi:  / Bacteria: Moderate /HPF          RADIOLOGY & ADDITIONAL STUDIES:  < from: Xray Chest 1 View-PORTABLE IMMEDIATE (11.23.18 @ 06:24) >  Impression:      No radiographic evidence of acute cardiopulmonary disease.    Support devices as described.    < end of copied text >    < from: CT Angio Chest w/ IV Cont (11.23.18 @ 02:08) >  IMPRESSION:     1. Multiple bilateral acute pulmonary emboli involving all 5 lobes of the   lungs as above. No definite CT evidence of right heart strain.    2. Compression fractures of T4, T5, T6, and T8 of indeterminate age.   Minimally displaced right L3 transverse process fracture.    3. Severe stenosis at the origin of the left renal artery with   poststenotic dilatation.    < end of copied text >

## 2018-11-23 NOTE — CONSULT NOTE ADULT - ASSESSMENT
ASSESSMENT/PLAN:   57M, PMHx seizure disorder, DLD, presents s/p fall from nursing home, experiencing SOB. Was in respiratory distress in the ED, was intubated for airway protection.   - f/u routine labs   - f/u CTAP  - f/u CT Chest   - f/u xray pelvis  - f/u Chest xray ASSESSMENT/PLAN:   57M, PMHx seizure disorder, DLD, presents s/p fall from nursing home, experiencing SOB. Was in respiratory distress in the ED, was intubated for airway protection.   - f/u routine labs   - f/u CTAP  - f/u CT Chest   - f/u xray pelvis  - f/u Chest xray .  Senior resident on call note : patient was seen and examined. Discussed the case with Dr. Lopes nd he agrees with the plan:  58 y/o m s/p possible fall ? w/o external sign of trauma ,p/w sob and using accessary muscles:  all ct were negative for acute trauma possible t.p fx l2-3 , ct chest : b/l pe and dvt lower extremities.  wbc 31 , la : 13 .  exam: unremarkable .  - no further trauma work needed   - clear from trauma stand point .

## 2018-11-23 NOTE — CONSULT NOTE ADULT - SUBJECTIVE AND OBJECTIVE BOX
TRAUMA ACTIVATION LEVEL:  Trauma Alert     MECHANISM OF INJURY:      [] Blunt  	[] MVC	[x] Fall	[] Pedestrian Struck	[] Motorcycle   [] Assault   [] Bicycle collision  [] Sports injury     [] Penetrating  	[] Gun Shot Wound 		[] Stab Wound    GCS: 15 	E: 4	V: 5	M: 6      HPI:  57M, PMHx of seizure disorder, DLD, presents to the ED sp fall from NH. Pt was brought in by EMS from his nursing home due to a fall and then experienced SOB. It is unclear which event happened first, either the SOB or fall. During assessment patient was is respiratory distress and hypoxic to 80s on RA and tachypneic. Pt reports b/l lateral chest pain and difficulty breathing. Pt doesn't know how he fell, denies HT/LOC. Pt was unable to provide further history due to respiratory distress.     Pt was intubated in the ED for airway protection.       PAST MEDICAL & SURGICAL HISTORY:  Unilateral inguinal hernia with obstruction and without gangrene, recurrence not specified  No significant past surgical history      Allergies    No Known Allergies    Intolerances    Seafood (Unknown)      Home Medications:  Dilantin 30 mg oral capsule, extended release: 1 cap(s) orally once a day (at bedtime) (15 May 2016 19:31)  Flomax 0.4 mg oral capsule: 1 cap(s) orally once a day (15 May 2016 19:31)  Keppra 250 mg oral tablet: 1 tab(s) orally 2 times a day (15 May 2016 19:31)      ROS: 10-system review is otherwise negative except HPI above.      Primary Survey:    A - airway intact  B - bilateral breath sounds and good chest rise  C - palpable pulses in all extremities  D - GCS 15 on arrival, GERALDO  Exposure obtained    Vital Signs Last 24 Hrs  T(C): 37.6 (23 Nov 2018 00:16), Max: 37.6 (23 Nov 2018 00:16)  T(F): 99.7 (23 Nov 2018 00:16), Max: 99.7 (23 Nov 2018 00:16)  HR: 105 (23 Nov 2018 02:09) (102 - 122)  BP: 107/84 (23 Nov 2018 02:09) (102/76 - 120/82)  BP(mean): --  RR: 26 (23 Nov 2018 00:16) (26 - 26)  SpO2: 97% (23 Nov 2018 01:01) (89% - 97%)    Secondary Survey:   General: Respiratory distress   HEENT: Normocephalic, atraumatic, EOMI, PEERLA. no scalp lacerations   Neck: Soft, midline trachea. no cspine tenderness  Chest: No chest wall tenderness. or subq  emphysema   Cardiac: S1, S2, RRR  Respiratory: Bilateral breath sounds, clear and equal bilaterally  Abdomen: Soft, non-distended, non-tender, no rebound,   Groin: Normal appearing, pelvis stable   Ext: palp radial b/l UE, b/l DP palp in Lower Extrem.     FAST    Procedures:    LABS:  Labs:  CAPILLARY BLOOD GLUCOSE                              13.2   31.61 )-----------( 229      ( 23 Nov 2018 00:15 )             41.8       Auto Neutrophil %: 88.0 % (11-23-18 @ 00:15)  Band Neutrophils %: 3.4 % (11-23-18 @ 00:15)    11-23    132<L>  |  85<L>  |  40<H>  ----------------------------<  321<H>  6.1<HH>   |  10<L>  |  3.1<H>      Calcium, Total Serum: 10.2 mg/dL (11-23-18 @ 00:15)      LFTs:             8.4  | 0.7  | 33       ------------------[203     ( 23 Nov 2018 00:15 )  4.4  | x    | 18          Lipase:11     Amylase:x         Blood Gas Venous - Lactate: 13.7 mmoL/L (11-23-18 @ 00:30)      Coags:     x      ----< x       ( 23 Nov 2018 00:15 )     39.5        CARDIAC MARKERS ( 23 Nov 2018 00:15 )  x     / <0.01 ng/mL / x     / x     / x                  Alcohol, Blood: <10 mg/dL (11-23-18 @ 00:15)      RADIOLOGY & ADDITIONAL STUDIES:  < from: CT Cervical Spine No Cont (11.23.18 @ 02:02) >  IMPRESSION:    Motion limited exam.    No gross fractures.    Moderate multilevel degenerative changes.    Enteric tube within the oropharynx with tip in the proximal trachea.    < end of copied text >      < from: CT Head No Cont (11.23.18 @ 02:01) >  Impression:     No acute intracranial hemorrhage.    Age-indeterminate infarct within the high left parietal lobe. Consider   MRI of the brain for further evaluation.    < end of copied text >    PENDING ..     ---------------------------------------------------------------------------------------

## 2018-11-23 NOTE — CONSULT NOTE ADULT - ASSESSMENT
57M, PMHx of seizure disorder, DLD, presents to the ED sp fall from NH. Pt was brought in by EMS from his nursing home due to a fall and then experienced SOB. It is unclear which event happened first, either the SOB or fall. During assessment patient was is respiratory distress and hypoxic to 80s on RA and tachypneic. Pt reports b/l lateral chest pain and difficulty breathing. Pt doesn't know how he fell, denies HT/LOC. Pt was intubated in the ED for airway protection.  In ed, he was found to have b/l pe without strain, medhat with HAGMA and lactic acidosis. As per the nurse pt had 1 episode of coffee ground emesis in ED. On exam pt have OG tube in place and draining brown material with no blood or coffee ground material. Rectal exam showing brown stool.     # Questionable coffee ground emesis in ED/ No BM since admission / Rectal exam brown stools/ no significant drop in Hg/ OG tube no blood or coffee ground material   No signs of active GI bleed/ VS stable  Keep NPO for now  Repeat Hg q12h   Protonix 40 IV BID   Patient have no signs of active GI bleed and he has BL PE and Benefits of AC outweigh the risks , can start Heparin drip from GI standpoint  if indicated   Will consider EGD later this admission when more stable   Will discuss with attending       #Bilateral PE   No Signs of active GI bleed  No contraindication from GI standpoint to start Heparin drip       #MEDHAT/ HAGMA  Follow Electrolytes  Correct hyperkalemia and hyponatremia   Consider nephrology eval     #Seizures/ Patient came with fall/ Status epilepticus   Continue Phenytoin   Sedation with propofol and fentanyl   Neurology Eval     # Mild Transaminitis could be DILI vs Congestive hepatopathy  Follow daily Liver enzymes   Check G GT   Non urgent US abdomen later this admission 57M, PMHx of seizure disorder, DLD, presents to the ED sp fall from NH. Pt was brought in by EMS from his nursing home due to a fall and then experienced SOB. It is unclear which event happened first, either the SOB or fall. During assessment patient was is respiratory distress and hypoxic to 80s on RA and tachypneic. Pt reports b/l lateral chest pain and difficulty breathing. Pt doesn't know how he fell, denies HT/LOC. Pt was intubated in the ED for airway protection.  In ed, he was found to have b/l pe without strain, medhat with HAGMA and lactic acidosis. As per the nurse pt had 1 episode of coffee ground emesis in ED. On exam pt have OG tube in place and draining brown material with no blood or coffee ground material. Rectal exam showing brown stool.     # Questionable coffee ground emesis in ED/ No BM since admission / Rectal exam brown stools/ no significant drop in Hg/ OG tube no blood or coffee ground material   No signs of active GI bleed/ VS stable  Keep NPO for now  Repeat Hg q12h   Protonix 40 IV BID   Patient have no signs of active GI bleed and he has BL PE and Benefits of AC outweigh the risks , can start Heparin drip from GI standpoint  if indicated   Will consider EGD later this admission when more stable   Will discuss with attending       #Bilateral PE   No contraindication from GI standpoint to start Heparin drip       #MEDHAT/ HAGMA  Follow Electrolytes  Correct hyperkalemia and hyponatremia   Consider nephrology eval     #Seizures/ Patient came with fall/ Status epilepticus   Continue Phenytoin   Sedation with propofol and fentanyl   Neurology Eval     # Mild Transaminitis could be DILI vs Congestive hepatopathy  Follow daily Liver enzymes   Check G GT   Non urgent US abdomen later this admission      will f/up

## 2018-11-23 NOTE — CONSULT NOTE ADULT - ASSESSMENT
58 yo M with with 5 lobe PE and b/l DVT; pt is intubated and sedated, has MEDHAT with HAGMA, lactic acidosis      PLAN:    - Per GI benefit outweighs risk for AC, hgb stable at 10.9    - please find out information about when/where/why IVC filter was placed    - heparin drip started

## 2018-11-23 NOTE — ED PROVIDER NOTE - CARE PLAN
Principal Discharge DX:	Acute respiratory failure with hypoxia  Secondary Diagnosis:	Lactic acidosis Principal Discharge DX:	Acute respiratory failure with hypoxia  Secondary Diagnosis:	Lactic acidosis  Secondary Diagnosis:	Fall, initial encounter Principal Discharge DX:	Acute respiratory failure with hypoxia  Secondary Diagnosis:	Lactic acidosis  Secondary Diagnosis:	Other acute pulmonary embolism without acute cor pulmonale

## 2018-11-23 NOTE — ED ADULT NURSE NOTE - CHIEF COMPLAINT QUOTE
Pt BIBA from Nursing home for difficulty breathing. Patient had witnessed fall earlier in night with no LOC, no anticoagulants, or head trauma per EMS. Patient began having difficulty breathing which prompted EMS call. On arrival patient tachypneic, belly breathing, unable to speak in full sentences, saturating 89% on NRB. Patient complaining of right rib pain- lungs clear to auscultation.

## 2018-11-23 NOTE — PROGRESS NOTE ADULT - ATTENDING COMMENTS
57 yr old man- very complicated medical history- this admission for sob- jodee p/e-  neuro question- can anticoagulation be given with the age indeterminate infarcts-    The patients Ct was compared to one from 2016-  in two years patient has had a craniotomy-  and a significant neurologic illness that caused brain atrophy, white matter changes and multiple infarcts- not clear from chart what the illness was-      At this time- with jodee P/e- benefit of anticoagulation outweighs risk-    brain MRI whenever possible (sooner than later)    agree with above- monitor PTT maintain around 50-60, Keep BP around 140 systolic or lower

## 2018-11-23 NOTE — CONSULT NOTE ADULT - ASSESSMENT
IMPRESSION: ACUTE HYPOXIC RESPIRATORY FAILURE/ PE/ NO RV STRAIN ON CT/ EXTENSIVE DVT/ CVA/ GFF/ RENAL FAILURE, STAGE SACRAL ULCER/ ? GI BLEED/ SUBSTANCE ABUSE      PLAN:    CNS: PROPOFOL/ IF NEEDED FENTANYL/ NEURO EVAL    HEENT:  Oral care    PULMONARY:  HOB @ 45 degrees, adjust vent settings accordingly      CARDIOVASCULAR: CE, ECHO, CARDIO EVAL IVF, ICF CBC STABLE START HEAPRIN    GI: GI prophylaxis                                          Feeding PROTONIX DRIP    RENAL:  F/u  lytes.  Correct as needed. accurate I/O, RENAL EVAL    INFECTIOUS DISEASE: ABX/ BURN EVAL    HEMATOLOGICAL:  DVT prophylaxis. VASCUALR EVAL    ENDOCRINE:  Follow up FS.  Insulin protocol if needed.      CODE STATUS: FULL CODE    DISPOSITION: Pt requires continued monitoring in the MICU  WILL CONTACT FAMILY/ PCP FOR MORE INFORMATION  PALLIATIVE CARE EVAL  POOR PROGNOSIS

## 2018-11-23 NOTE — H&P ADULT - HISTORY OF PRESENT ILLNESS
57M, PMHx of seizure disorder, DLD, presents to the ED sp fall from NH. Pt was brought in by EMS from his nursing home due to a fall and then experienced SOB. It is unclear which event happened first, either the SOB or fall. During assessment patient was is respiratory distress and hypoxic to 80s on RA and tachypneic. Pt reports b/l lateral chest pain and difficulty breathing. Pt doesn't know how he fell, denies HT/LOC. Pt was unable to provide further history due to intubation    Pt was intubated in the ED for airway protection.   In ed, he was found to have b/l pe without strain, melina with HAGMA and lactic acidosis, coffee ground emesis.

## 2018-11-23 NOTE — CONSULT NOTE ADULT - SUBJECTIVE AND OBJECTIVE BOX
LIN BENTON [id number]  57y Male    HPI:  57M, PMHx of seizure disorder, DLD, infrarenal IVC filter (unknown where placed; no records here of vascular seeing pt in the past) presented to the ED from nursing home on 11/23 s/p fall. Pt was found to be SOB in ED. CT obtained showed 5 lobe subsegmental PE's. Pt was in respiratory distress and was intubated. In ED pt found to have MEDHAT with HAGMA and lactic acidosis, one episode coffee ground emesis per notes. Vascular consulted after primary team noticed b/l LE swelling; duplex showed b/l LE DVT's. PT currently intubated and sedated.     PAST MEDICAL & SURGICAL HISTORY:  HLD (hyperlipidemia)  HTN (hypertension)  Seizures  Unilateral inguinal hernia with obstruction and without gangrene, recurrence not specified  No significant past surgical history        MEDICATIONS  (STANDING):  atorvastatin 40 milliGRAM(s) Oral at bedtime  chlorhexidine 0.12% Liquid 15 milliLiter(s) Oral Mucosa every 12 hours  chlorhexidine 4% Liquid 1 Application(s) Topical <User Schedule>  fentaNYL   Infusion 0.5 MICROgram(s)/kG/Hr (4.25 mL/Hr) IV Continuous <Continuous>  folic acid 1 milliGRAM(s) Oral daily  levETIRAcetam  IVPB 1500 milliGRAM(s) IV Intermittent every 12 hours  meropenem  IVPB 500 milliGRAM(s) IV Intermittent every 12 hours  meropenem  IVPB      pantoprazole Infusion 8 mG/Hr (10 mL/Hr) IV Continuous <Continuous>  phenytoin  IVPB 200 milliGRAM(s) IV Intermittent four times a day  propofol Infusion 5 MICROgram(s)/kG/Min (2.55 mL/Hr) IV Continuous <Continuous>  sodium bicarbonate 650 milliGRAM(s) Oral three times a day  thiamine Injectable 100 milliGRAM(s) IV Push daily  vancomycin  IVPB 1250 milliGRAM(s) IV Intermittent daily    MEDICATIONS  (PRN):      Allergies    No Known Allergies    Intolerances    Seafood (Unknown)        Vital Signs Last 24 Hrs  T(C): 35.7 (23 Nov 2018 10:00), Max: 37.6 (23 Nov 2018 00:16)  T(F): 96.3 (23 Nov 2018 10:00), Max: 99.7 (23 Nov 2018 00:16)  HR: 104 (23 Nov 2018 10:00) (92 - 122)  BP: 85/64 (23 Nov 2018 10:00) (85/64 - 136/82)  BP(mean): 74 (23 Nov 2018 10:00) (74 - 105)  RR: 21 (23 Nov 2018 10:00) (14 - 26)  SpO2: 100% (23 Nov 2018 10:00) (89% - 100%)    PHYSICAL EXAM:  GENERAL: intubated and sedated.   EXTREMITIES:        LABS:  Labs:  CAPILLARY BLOOD GLUCOSE      POCT Blood Glucose.: 235 mg/dL (23 Nov 2018 09:30)  POCT Blood Glucose.: 148 mg/dL (23 Nov 2018 08:55)  POCT Blood Glucose.: 158 mg/dL (23 Nov 2018 06:37)  POCT Blood Glucose.: 247 mg/dL (23 Nov 2018 03:00)                          10.9   26.85 )-----------( 165      ( 23 Nov 2018 10:34 )             32.5       Auto Neutrophil %: 78.4 % (11-23-18 @ 10:34)  Auto Immature Granulocyte %: 1.0 % (11-23-18 @ 10:34)  Auto Neutrophil %: 81.8 % (11-23-18 @ 05:06)  Auto Immature Granulocyte %: 1.2 % (11-23-18 @ 05:06)  Auto Neutrophil %: 88.0 % (11-23-18 @ 00:15)  Band Neutrophils %: 3.4 % (11-23-18 @ 00:15)    11-23    133<L>  |  91<L>  |  45<H>  ----------------------------<  128<H>  5.0   |  22  |  2.3<H>      Calcium, Total Serum: 8.7 mg/dL (11-23-18 @ 10:34)      LFTs:             6.6  | 0.3  | 72       ------------------[159     ( 23 Nov 2018 10:34 )  3.6  | x    | 66          Lipase:x      Amylase:x         Blood Gas Arterial, Lactate: 1.8 mmoL/L (11-23-18 @ 07:17)  Lactate, Blood: 3.3 mmol/L (11-23-18 @ 06:26)  Blood Gas Arterial, Lactate: 5.8 mmoL/L (11-23-18 @ 03:05)  Blood Gas Venous - Lactate: 13.7 mmoL/L (11-23-18 @ 00:30)    ABG - ( 23 Nov 2018 07:17 )  pH: 7.37  /  pCO2: 33    /  pO2: 159   / HCO3: 19    / Base Excess: -5.7  /  SaO2: 100             ABG - ( 23 Nov 2018 03:05 )  pH: 7.16  /  pCO2: 40    /  pO2: 498   / HCO3: 14    / Base Excess: -14.0 /  SaO2: 100               Coags:     14.10  ----< 1.23    ( 23 Nov 2018 05:06 )     x           CARDIAC MARKERS ( 23 Nov 2018 05:06 )  x     / <0.01 ng/mL / 94 U/L / x     / 2.1 ng/mL  CARDIAC MARKERS ( 23 Nov 2018 00:15 )  x     / <0.01 ng/mL / x     / x     / x          Urinalysis Basic - ( 23 Nov 2018 04:30 )    Color: Dark Yellow / Appearance: Cloudy / SG: >=1.030 / pH: x  Gluc: x / Ketone: Trace  / Bili: Negative / Urobili: 0.2   Blood: x / Protein: 100 / Nitrite: Negative   Leuk Esterase: Negative / RBC: 6-10 /HPF / WBC x   Sq Epi: x / Non Sq Epi: x / Bacteria: Moderate /HPF            RADIOLOGY & ADDITIONAL STUDIES:  < from: CT Angio Abdomen and Pelvis w/ IV Cont (11.23.18 @ 02:09) >    IMPRESSION:     1. Multiple bilateral acute pulmonary emboli involving all 5 lobes of the   lungs as above. No definite CT evidence of right heart strain.    2. Compression fractures of T4, T5, T6, and T8 of indeterminate age.   Minimally displaced right L3 transverse process fracture.    3. Severe stenosis at the origin of the left renal artery with   poststenotic dilatation.      Dr. Cole Macias discussed preliminary findings with Dr. Modi on   11/23/2018 2:38 AM with readback. Final report discussed with Dr. Ray Harry on 11/23/2018 6:40 AM with readback.    < end of copied text > LIN BENTON [id number]  57y Male    HPI:  57M, PMHx of seizure disorder, DLD, infrarenal IVC filter (unknown where placed; no records here of vascular seeing pt in the past) presented to the ED from nursing home on 11/23 s/p fall. Pt was found to be SOB in ED. CT obtained showed 5 lobe subsegmental PE's. Pt was in respiratory distress and was intubated. In ED pt found to have MEDHAT with HAGMA and lactic acidosis, one episode coffee ground emesis per notes. Vascular consulted after primary team noticed b/l LE swelling; duplex showed b/l LE DVT's. PT currently intubated and sedated.     PAST MEDICAL & SURGICAL HISTORY:  HLD (hyperlipidemia)  HTN (hypertension)  Seizures  Unilateral inguinal hernia with obstruction and without gangrene, recurrence not specified  No significant past surgical history        MEDICATIONS  (STANDING):  atorvastatin 40 milliGRAM(s) Oral at bedtime  chlorhexidine 0.12% Liquid 15 milliLiter(s) Oral Mucosa every 12 hours  chlorhexidine 4% Liquid 1 Application(s) Topical <User Schedule>  fentaNYL   Infusion 0.5 MICROgram(s)/kG/Hr (4.25 mL/Hr) IV Continuous <Continuous>  folic acid 1 milliGRAM(s) Oral daily  levETIRAcetam  IVPB 1500 milliGRAM(s) IV Intermittent every 12 hours  meropenem  IVPB 500 milliGRAM(s) IV Intermittent every 12 hours  meropenem  IVPB      pantoprazole Infusion 8 mG/Hr (10 mL/Hr) IV Continuous <Continuous>  phenytoin  IVPB 200 milliGRAM(s) IV Intermittent four times a day  propofol Infusion 5 MICROgram(s)/kG/Min (2.55 mL/Hr) IV Continuous <Continuous>  sodium bicarbonate 650 milliGRAM(s) Oral three times a day  thiamine Injectable 100 milliGRAM(s) IV Push daily  vancomycin  IVPB 1250 milliGRAM(s) IV Intermittent daily    MEDICATIONS  (PRN):      Allergies    No Known Allergies    Intolerances    Seafood (Unknown)        Vital Signs Last 24 Hrs  T(C): 35.7 (23 Nov 2018 10:00), Max: 37.6 (23 Nov 2018 00:16)  T(F): 96.3 (23 Nov 2018 10:00), Max: 99.7 (23 Nov 2018 00:16)  HR: 104 (23 Nov 2018 10:00) (92 - 122)  BP: 85/64 (23 Nov 2018 10:00) (85/64 - 136/82)  BP(mean): 74 (23 Nov 2018 10:00) (74 - 105)  RR: 21 (23 Nov 2018 10:00) (14 - 26)  SpO2: 100% (23 Nov 2018 10:00) (89% - 100%)    PHYSICAL EXAM:  GENERAL: intubated and sedated.   EXTREMITIES:  b/l taught, swollen LE, able to doppler DP/PT.       LABS:  Labs:  CAPILLARY BLOOD GLUCOSE      POCT Blood Glucose.: 235 mg/dL (23 Nov 2018 09:30)  POCT Blood Glucose.: 148 mg/dL (23 Nov 2018 08:55)  POCT Blood Glucose.: 158 mg/dL (23 Nov 2018 06:37)  POCT Blood Glucose.: 247 mg/dL (23 Nov 2018 03:00)                          10.9   26.85 )-----------( 165      ( 23 Nov 2018 10:34 )             32.5       Auto Neutrophil %: 78.4 % (11-23-18 @ 10:34)  Auto Immature Granulocyte %: 1.0 % (11-23-18 @ 10:34)  Auto Neutrophil %: 81.8 % (11-23-18 @ 05:06)  Auto Immature Granulocyte %: 1.2 % (11-23-18 @ 05:06)  Auto Neutrophil %: 88.0 % (11-23-18 @ 00:15)  Band Neutrophils %: 3.4 % (11-23-18 @ 00:15)    11-23    133<L>  |  91<L>  |  45<H>  ----------------------------<  128<H>  5.0   |  22  |  2.3<H>      Calcium, Total Serum: 8.7 mg/dL (11-23-18 @ 10:34)      LFTs:             6.6  | 0.3  | 72       ------------------[159     ( 23 Nov 2018 10:34 )  3.6  | x    | 66          Lipase:x      Amylase:x         Blood Gas Arterial, Lactate: 1.8 mmoL/L (11-23-18 @ 07:17)  Lactate, Blood: 3.3 mmol/L (11-23-18 @ 06:26)  Blood Gas Arterial, Lactate: 5.8 mmoL/L (11-23-18 @ 03:05)  Blood Gas Venous - Lactate: 13.7 mmoL/L (11-23-18 @ 00:30)    ABG - ( 23 Nov 2018 07:17 )  pH: 7.37  /  pCO2: 33    /  pO2: 159   / HCO3: 19    / Base Excess: -5.7  /  SaO2: 100             ABG - ( 23 Nov 2018 03:05 )  pH: 7.16  /  pCO2: 40    /  pO2: 498   / HCO3: 14    / Base Excess: -14.0 /  SaO2: 100               Coags:     14.10  ----< 1.23    ( 23 Nov 2018 05:06 )     x           CARDIAC MARKERS ( 23 Nov 2018 05:06 )  x     / <0.01 ng/mL / 94 U/L / x     / 2.1 ng/mL  CARDIAC MARKERS ( 23 Nov 2018 00:15 )  x     / <0.01 ng/mL / x     / x     / x          Urinalysis Basic - ( 23 Nov 2018 04:30 )    Color: Dark Yellow / Appearance: Cloudy / SG: >=1.030 / pH: x  Gluc: x / Ketone: Trace  / Bili: Negative / Urobili: 0.2   Blood: x / Protein: 100 / Nitrite: Negative   Leuk Esterase: Negative / RBC: 6-10 /HPF / WBC x   Sq Epi: x / Non Sq Epi: x / Bacteria: Moderate /HPF            RADIOLOGY & ADDITIONAL STUDIES:  < from: CT Angio Abdomen and Pelvis w/ IV Cont (11.23.18 @ 02:09) >    IMPRESSION:     1. Multiple bilateral acute pulmonary emboli involving all 5 lobes of the   lungs as above. No definite CT evidence of right heart strain.    2. Compression fractures of T4, T5, T6, and T8 of indeterminate age.   Minimally displaced right L3 transverse process fracture.    3. Severe stenosis at the origin of the left renal artery with   poststenotic dilatation.      Dr. Cole Macias discussed preliminary findings with Dr. Modi on   11/23/2018 2:38 AM with readback. Final report discussed with Dr. Ray Harry on 11/23/2018 6:40 AM with readback.    < end of copied text >

## 2018-11-23 NOTE — PROCEDURE NOTE - ADDITIONAL PROCEDURE DETAILS
pre induction VS , B/P 108/72/ o2 sat 100, rr 30, unresponsive pre o2 x 3 mins 100 NRM, DLx 1 with mac 4 grade 1 view oral secretions suctioned. post vs , /70, on vent o2sat 100

## 2018-11-23 NOTE — H&P ADULT - NSHPPHYSICALEXAM_GEN_ALL_CORE
T(C): 37.6 (11-23-18 @ 00:16), Max: 37.6 (11-23-18 @ 00:16)  HR: 107 (11-23-18 @ 04:18) (102 - 122)  BP: 134/90 (11-23-18 @ 04:18) (102/76 - 134/90)  RR: 26 (11-23-18 @ 00:16) (26 - 26)  SpO2: 98% (11-23-18 @ 04:18) (89% - 99%)    PHYSICAL EXAM:  GENERAL: sedated, intubated  HEAD:  Atraumatic, Normocephalic  EYES: EOMI, PERRLA,   NECK: Supple  CHEST/LUNG: b/l bs  HEART: Regular rhythm, tachycardic  ABDOMEN: Soft, Nontender, Nondistended;  EXTREMITIES:  2+ Peripheral Pulses  PSYCH: intubated

## 2018-11-23 NOTE — ED PROVIDER NOTE - PHYSICAL EXAMINATION
Vital signs reviewed  GENERAL: Patient in acute respiratory distress  HEAD: NCAT  EYES: PERRL, EOMI  ENT: MMM  NECK: Supple, non tender  RESPIRATORY: Lungs clear. Tachypneic with RR in 30s  CARDIOVASCULAR: Tachycardic. Normal S1/S2. No murmurs, rubs or gallops  ABDOMEN: Soft. Nondistended. Nontender. No guarding or rebound  MUSCULOSKELETAL/EXTREMITIES: Brisk cap refill. 2+ radial pulses. No leg edema  SKIN:  Warm and dry. No acute rash  NEURO: AAOx3. No gross FND  PSYCHIATRIC: Cooperative. Affect appropriate Vital signs reviewed  GENERAL: Patient in acute respiratory distress  HEAD: NCAT  EYES: Anicteric  ENT: MMM  NECK: Supple, non tender  RESPIRATORY: Lungs clear. Tachypneic with RR in 30s. Speaking in 1 word sentences. Retractions. Acute resiporatory distress  CARDIOVASCULAR: Tachycardic. Normal S1/S2.   ABDOMEN: Soft. Nondistended. Nontender. No guarding or rebound  MUSCULOSKELETAL/EXTREMITIES: Brisk cap refill. 2+ equal radial pulses. B/L leg edema  SKIN:  Extremities cool to touch  NEURO: AAOx3. Moving all extremities.   PSYCHIATRIC: Cooperative. Affect appropriate

## 2018-11-23 NOTE — ED ADULT NURSE NOTE - OBJECTIVE STATEMENT
s/p fall, pt. presents with labored breathing, denies head trauma or LOC, +respiratory distress, pt. intubated in ER

## 2018-11-23 NOTE — ED PROVIDER NOTE - MEDICAL DECISION MAKING DETAILS
Pt evaluated by me after sign out; Intubated. + difficulty sedating. Tox panel sent, ICU resident at bedside, constructed renal team and IR was consulted by ICU resident. Plan is central line placement and admission.

## 2018-11-23 NOTE — H&P ADULT - ASSESSMENT
57M, PMHx of seizure disorder, DLD, presents to the ED s/p fall and sob , was intubated    Assessment-   ARF 2/2 b/l PE without heart strain 57M, PMHx of seizure disorder, DLD, presents to the ED s/p fall and sob , was intubated    Assessment-   ARF 2/2 b/l PE without heart strain  Coffee ground emesis likely 2/2 ? UGIB  seizure   MEDHAT   HAGMA  lactic acidosis    Plan     CNS- sedation with fentanyl and propofol, eeg 57M, PMHx of seizure disorder, DLD, presents to the ED s/p fall and sob , was intubated    Assessment-   ARF 2/2 b/l PE without heart strain  Coffee ground emesis likely 2/2 ? UGIB  seizure   MEDHAT   HAGMA  lactic acidosis    Plan     CNS- sedation with fentanyl and propofol, eeg  , Continue with thiamine and folic acid.       HEENT: Oral care    PULMONARY:  HOB @ 45 degrees.  Aspiration precaution ,     CARDIOVASCULAR: repeat ce x1, echo     GI: ppi bid, cbc q12, npo for now, gi eval, hold AC , if repeat hb stable , start heparin drip    RENAL:  Follow up lytes and bmp, start bicarb drip @100 cc/hr, repeat abg , renal eval    INFECTIOUS DISEASE: Follow up cultures.  Continue vancomycin and meropenam     HEMATOLOGICAL:  duplex , cbc q12    ENDOCRINE:  Follow up FS.  Insulin protocol if needed.    grave  prognosis 57M, PMHx of seizure disorder, DLD, presents to the ED s/p fall and sob , was intubated    Assessment-   ARF 2/2 b/l PE without heart strain  Coffee ground emesis likely 2/2 ? UGIB  seizure   MEDHAT   HAGMA  lactic acidosis  Acute l2-l3 fractures    Plan     CNS- sedation with fentanyl and propofol, eeg  , Continue with thiamine and folic acid.       HEENT: Oral care    PULMONARY:  HOB @ 45 degrees.  Aspiration precaution ,     CARDIOVASCULAR: repeat ce x1, echo     GI: ppi bid, cbc q12, npo for now, gi eval, hold AC     RENAL:  Follow up lytes and bmp, start bicarb drip @100 cc/hr, repeat abg , renal eval    INFECTIOUS DISEASE: Follow up cultures.  Continue vancomycin and meropenem     HEMATOLOGICAL:  duplex , cbc q12    ENDOCRINE:  Follow up FS.      grave  prognosis

## 2018-11-23 NOTE — H&P ADULT - NSHPLABSRESULTS_GEN_ALL_CORE
13.2   31.61 )-----------( 229      ( 23 Nov 2018 00:15 )             41.8       11-23    132<L>  |  85<L>  |  40<H>  ----------------------------<  321<H>  6.1<HH>   |  10<L>  |  3.1<H>    Ca    10.2<H>      23 Nov 2018 00:15  Mg     2.8     11-23    TPro  8.4<H>  /  Alb  4.4  /  TBili  0.7  /  DBili  x   /  AST  33  /  ALT  18  /  AlkPhos  203<H>  11-23          ABG - ( 23 Nov 2018 03:05 )  pH, Arterial: 7.16  pH, Blood: x     /  pCO2: 40    /  pO2: 498   / HCO3: 14    / Base Excess: -14.0 /  SaO2: 100                     PTT - ( 23 Nov 2018 00:15 )  PTT:39.5 sec    Lactate Trend      CARDIAC MARKERS ( 23 Nov 2018 00:15 )  x     / <0.01 ng/mL / x     / x     / x            CAPILLARY BLOOD GLUCOSE      POCT Blood Glucose.: 247 mg/dL (23 Nov 2018 03:00)    < from: CT Angio Abdomen and Pelvis w/ IV Cont (11.23.18 @ 02:09) >    Distal right main pulmonary embolism extending into all right-sided lobes   as well as left upper and lower lobesegmental pulmonary embolisms. No   evidence of right heart strain.    Acute minimally displaced L2 and L3 transverse process fractures.    Severe stenosis of the origin of the left renal artery with poststenotic   dilatation.    < end of copied text >    Motion limited exam.    No gross fractures.    Moderate multilevel degenerative changes.    Enteric tube within the oropharynx with tip in the proximal trachea.    < end of copied text >    < from: CT Head No Cont (11.23.18 @ 02:01) >    No acute intracranial hemorrhage.    Age-indeterminate infarct within the high left parietal lobe. Consider   MRI of the brain for further evaluation.        < end of copied text >    ekg- sinus tachcyardic, twi in v4-v6 new

## 2018-11-23 NOTE — ED ADULT TRIAGE NOTE - CHIEF COMPLAINT QUOTE
Pt BIBA for S/P fall and trouble breathing Pt BIBA from Nursing home for difficulty breathing. Patient had witnessed fall earlier in night with no LOC or head trauma per EMS. Patient began having difficulty breathing which prompted EMS call. On arrival patient tachypneic, belly breathing, unable to speak in full sentences, saturating 89% on NRB. Patient complaining of right rib pain- lungs clear to auscultation. Pt BIBA from Nursing home for difficulty breathing. Patient had witnessed fall earlier in night with no LOC, no anticoagulants, or head trauma per EMS. Patient began having difficulty breathing which prompted EMS call. On arrival patient tachypneic, belly breathing, unable to speak in full sentences, saturating 89% on NRB. Patient complaining of right rib pain- lungs clear to auscultation.

## 2018-11-23 NOTE — CONSULT NOTE ADULT - SUBJECTIVE AND OBJECTIVE BOX
We were asked to see the patient for coffee ground emesis    GI HPI: Hx taken from chart and resident   57M, PMHx of seizure disorder, DLD, presents to the ED sp fall from NH. Pt was brought in by EMS from his nursing home due to a fall and then experienced SOB. It is unclear which event happened first, either the SOB or fall. During assessment patient was is respiratory distress and hypoxic to 80s on RA and tachypneic. Pt reports b/l lateral chest pain and difficulty breathing. Pt doesn't know how he fell, denies HT/LOC. Pt was intubated in the ED for airway protection.  In ed, he was found to have b/l pe without strain, melina with HAGMA and lactic acidosis. As per the nurse pt had 1 episode of coffee ground emesis in ED. On exam pt have OG tube in place and draining brown material with no blood or coffee ground material. Rectal exam showing brown stool.         PAST MEDICAL & SURGICAL HISTORY  HLD (hyperlipidemia)  HTN (hypertension)  Seizures  Unilateral inguinal hernia with obstruction and without gangrene, recurrence not specified  No significant past surgical history        SOCIAL HISTORY:  Unable to obtain     FAMILY HISTORY:  FAMILY HISTORY:  No pertinent family history in first degree relatives      ALLERGIES:  No Known Allergies      MEDICATIONS:  MEDICATIONS  (STANDING):  atorvastatin 40 milliGRAM(s) Oral at bedtime  fentaNYL   Infusion 0.5 MICROgram(s)/kG/Hr (4.25 mL/Hr) IV Continuous <Continuous>  folic acid 1 milliGRAM(s) Oral daily  levETIRAcetam  IVPB 1500 milliGRAM(s) IV Intermittent every 12 hours  meropenem  IVPB      meropenem  IVPB 500 milliGRAM(s) IV Intermittent every 12 hours  pantoprazole  Injectable 40 milliGRAM(s) IV Push two times a day  phenytoin  IVPB 200 milliGRAM(s) IV Intermittent four times a day  propofol Infusion 5 MICROgram(s)/kG/Min (2.55 mL/Hr) IV Continuous <Continuous>  sodium bicarbonate  Infusion 0.176 mEq/kG/Hr (100 mL/Hr) IV Continuous <Continuous>  thiamine Injectable 100 milliGRAM(s) IV Push daily  vancomycin  IVPB 1250 milliGRAM(s) IV Intermittent daily    MEDICATIONS  (PRN):      HOME MEDICATIONS:  Home Medications:  Flomax 0.4 mg oral capsule: 1 cap(s) orally once a day (23 Nov 2018 04:31)  folic acid 1 mg oral tablet: 1 tab(s) orally once a day (23 Nov 2018 04:31)  Keppra 1000 mg oral tablet: 1 tab(s) orally every 8 hours (23 Nov 2018 04:31)  Lipitor 40 mg oral tablet: 1 tab(s) orally once a day (23 Nov 2018 04:31)  Norvasc 10 mg oral tablet: 1 tab(s) orally once a day (23 Nov 2018 04:31)  phenytoin 200 mg oral capsule, extended release: 1 cap(s) orally every 8 hours (23 Nov 2018 04:31)  phenytoin 200 mg oral capsule, extended release: 1 cap(s) orally once a day (at bedtime) (23 Nov 2018 04:31)  thiamine 100 mg oral tablet: 1 tab(s) orally once a day (23 Nov 2018 04:31)  Tylenol 325 mg oral tablet: 2 tab(s) orally every 4 hours, As Needed (23 Nov 2018 04:31)      ROS:     Unable to obtain patient is intubated        VITALS:   T(F): 97.3 (11-23 @ 06:05), Max: 99.7 (11-23 @ 00:16)  HR: 100 (11-23 @ 06:30) (100 - 122)  BP: 136/82 (11-23 @ 06:30) (102/76 - 136/82)  BP(mean): 105 (11-23 @ 06:30) (92 - 105)  RR: 14 (11-23 @ 06:30) (14 - 26)  SpO2: 99% (11-23 @ 06:30) (89% - 100%)    I&O's Summary    22 Nov 2018 07:01  -  23 Nov 2018 07:00  --------------------------------------------------------  IN: 300 mL / OUT: 0 mL / NET: 300 mL        PHYSICAL EXAM:  GENERAL:  Intubated, not on pressors   HEENT:  Conjunctivae  anicteric  CHEST:  Full & symmetric excursion  HEART:  N S1, S2  ABDOMEN:  Soft, non-distended, normoactive bowel sounds,  no masses   EXTREMITIES  no  edema  SKIN:  No rash  Rectal exam: Brown stools no blood or melena         LABS:                        11.1   27.53 )-----------( 171      ( 23 Nov 2018 05:06 )             33.1     PT/INR - ( 23 Nov 2018 05:06 )  INR: 1.23          PTT - ( 23 Nov 2018 00:15 )  PTT:39.5<H>  LIVER FUNCTIONS - ( 23 Nov 2018 05:06 )  Alb: 3.6 g/dL / Pro: 6.6 g/dL / ALK PHOS: 160 U/L / ALT: 57 U/L / AST: 58 U/L / GGT: x           11-23    133<L>  |  92<L>  |  42<H>  ----------------------------<  108<H>  6.0<HH>   |  20  |  2.4<H>    Ca    8.8      23 Nov 2018 05:06  Phos  7.6     11-23  Mg     2.1     11-23      CARDIAC MARKERS ( 23 Nov 2018 05:06 )  x     / <0.01 ng/mL / 94 U/L / x     / 2.1 ng/mL  CARDIAC MARKERS ( 23 Nov 2018 00:15 )  x     / <0.01 ng/mL / x     / x     / x              Previous EGD: nothing on records     Previous colonoscopy: nothing on records      RADIOLOGY:  < from: CT Angio Abdomen and Pelvis w/ IV Cont (11.23.18 @ 02:09) >    EXAM:  CT ANGIO ABD PELV (W)AW IC        EXAM:  CT ANGIO CHEST (W)AW IC            PROCEDURE DATE:  11/23/2018            INTERPRETATION:  CLINICAL STATEMENT: Shortness of breath and fall.      TECHNIQUE: Contiguous axial CTA images were obtained from the thoracic   inlet to the pubic symphysis following administration of 100c Optiray 320   intravenous contrast.  Oral contrast was not administered.  Reformatted   images in the coronal and sagittal planes were acquired.    COMPARISON CT: CT abdomen and pelvis performed 11/21/2016.    OTHER STUDIES USED FOR CORRELATION: None.       FINDINGS:    CHEST:    TUBES: Endotracheal tube in place.     PULMONARY ARTERIES: Acute pulmonary emboli within the distal right main   pulmonary artery extending into segmental arteries of the right upper,   right middle, and right lower lobes. Multiple acute pulmonary emboli   within the segmental arteries of the left upper and left lower lobes.   Small linear embolism within the main pulmonary artery. No definite CT   evidence of right heart strain.     LUNGS/PLEURA/AIRWAYS: 3 mm nodule in the right middle lobe of (series 6,   image 151). Bibasilar subsegmental atelectasis. No focal consolidations,   pneumothorax, pleural effusions. No endobronchial obstruction or   honeycombing.    MEDIASTINUM/THORACIC NODES: Unremarkable.    HEART/GREAT VESSELS: Heart is within normal limits in size. Coronary   arterial atherosclerosis. Trace pericardial fluid.Borderline dilatation   of the ascending aorta to 4.1 cm. Diffuse atherosclerotic disease. Mild   atherosclerotic disease at the origin of the celiac access with no   significant stenosis. Origins of the superior and inferior mesenteric   arteries are patent. Atherosclerotic disease in the distal superior  mesenteric artery. Severe stenosis at the origin of the left renal artery   with post stenotic dilatation. Atherosclerosis at the origin of the right   renal artery with mild stenosis.      ABDOMEN/PELVIS:    HEPATOBILIARY: Unremarkable.    SPLEEN:Unremarkable.    PANCREAS: Unremarkable.    ADRENAL GLANDS: Unremarkable.    KIDNEYS: Symmetric renal enhancement. No hydronephrosis. Stable right   lower pole cyst.    ABDOMINOPELVIC NODES: Unremarkable.    PELVIC ORGANS: Prostate calcifications. Decompressed bladder.    PERITONEUM/MESENTERY/BOWEL: No bowel obstruction or inflammation. No   pneumoperitoneum or ascites. Unremarkable appendix. Right inguinal hernia   repair. Small hiatal hernia. Fat-containing umbilical hernia.    BONES/SOFT TISSUES: Compression fracture of T4, T5, T6, and T8.   Questionable compression fractures of T7. Chronic loss of height of T9.   Acute minimally displaced right L3 transverse process fractures. Chronic   bilateral posterior rib fractures. Partially sacralized L5 vertebral   body. Degenerative changes of the thoracolumbar spine.    OTHER: Infrarenal IVC filter.      IMPRESSION:     1. Multiple bilateral acute pulmonary emboli involving all 5 lobes of the   lungs as above. No definite CT evidence of right heart strain.    2. Compression fractures of T4, T5, T6, and T8 of indeterminate age.   Minimally displaced right L3 transverse process fracture.    3. Severe stenosis at the origin of the left renal artery with   poststenotic dilatation.      Dr. Cole Macias discussed preliminary findings with Dr. Modi on   11/23/2018 2:38 AM with readback. Final report discussed with Dr. Ray Harry on 11/23/2018 6:40 AM with readback.      < end of copied text >  < from: CT Cervical Spine No Cont (11.23.18 @ 02:02) >    ******PRELIMINARY REPORT******    ******PRELIMINARY REPORT******          EXAM:  CT CERVICAL SPINE            PROCEDURE DATE:  11/23/2018          ******PRELIMINARY REPORT******    ******PRELIMINARY REPORT******          INTERPRETATION:  Clinical History / Reason for exam: Trauma.    TECHNIQUE:  Contiguous unenhanced CT axial images of the cervical spine   with coronal and sagittal re-formations.    COMPARISON: None available    FINDINGS:    Motion degraded exam.    There is no evidence of acute fracture, compression deformity or facet   subluxation of the cervical spine.     The atlantoaxial relationships are maintained.  The posterior elements   are intact. There is no significant prevertebral soft tissue swelling.   The visualized paraspinal soft tissues are unremarkable.     There are moderate multilevel degenerative changes.    Spina bifida occulta of C2.    The patient is intubated. There is a enteric tube is coiled within the   oropharynx with tip in the proximal trachea.      IMPRESSION:    Motion limited exam.    No gross fractures.    Moderate multilevel degenerative changes.    Enteric tube within the oropharynx with tip in the proximal trachea.    Dr. Cole Macias discussed preliminary findings with NIKKO MODI MD on   11/23/2018 2:20 AM with readback.            ******PRELIMINARY REPORT******    ******PRELIMINARY REPORT******          < end of copied text > We were asked to see the patient for coffee ground emesis (pt is intubated)   GI HPI: Hx taken from chart and resident   57M, PMHx of seizure disorder, DLD, presents to the ED sp fall from NH. Pt was brought in by EMS from his nursing home due to a fall and then experienced SOB. It is unclear which event happened first, either the SOB or fall. During assessment patient was is respiratory distress and hypoxic to 80s on RA and tachypneic. Pt reports b/l lateral chest pain and difficulty breathing. Pt doesn't know how he fell, denies HT/LOC. Pt was intubated in the ED for airway protection.  In ed, he was found to have b/l pe without strain, melina with HAGMA and lactic acidosis. As per the nurse pt had 1 episode of coffee ground emesis in ED. On exam pt have OG tube in place and draining brown material with no blood or coffee ground material. Rectal exam showing brown stool.         PAST MEDICAL & SURGICAL HISTORY  HLD (hyperlipidemia)  HTN (hypertension)  Seizures  Unilateral inguinal hernia with obstruction and without gangrene, recurrence not specified  No significant past surgical history        SOCIAL HISTORY:  Unable to obtain (pt is intubated)    FAMILY HISTORY:  FAMILY HISTORY:  No pertinent family history in first degree relatives      ALLERGIES:  No Known Allergies      MEDICATIONS:  MEDICATIONS  (STANDING):  atorvastatin 40 milliGRAM(s) Oral at bedtime  fentaNYL   Infusion 0.5 MICROgram(s)/kG/Hr (4.25 mL/Hr) IV Continuous <Continuous>  folic acid 1 milliGRAM(s) Oral daily  levETIRAcetam  IVPB 1500 milliGRAM(s) IV Intermittent every 12 hours  meropenem  IVPB      meropenem  IVPB 500 milliGRAM(s) IV Intermittent every 12 hours  pantoprazole  Injectable 40 milliGRAM(s) IV Push two times a day  phenytoin  IVPB 200 milliGRAM(s) IV Intermittent four times a day  propofol Infusion 5 MICROgram(s)/kG/Min (2.55 mL/Hr) IV Continuous <Continuous>  sodium bicarbonate  Infusion 0.176 mEq/kG/Hr (100 mL/Hr) IV Continuous <Continuous>  thiamine Injectable 100 milliGRAM(s) IV Push daily  vancomycin  IVPB 1250 milliGRAM(s) IV Intermittent daily    MEDICATIONS  (PRN):      HOME MEDICATIONS:  Home Medications:  Flomax 0.4 mg oral capsule: 1 cap(s) orally once a day (23 Nov 2018 04:31)  folic acid 1 mg oral tablet: 1 tab(s) orally once a day (23 Nov 2018 04:31)  Keppra 1000 mg oral tablet: 1 tab(s) orally every 8 hours (23 Nov 2018 04:31)  Lipitor 40 mg oral tablet: 1 tab(s) orally once a day (23 Nov 2018 04:31)  Norvasc 10 mg oral tablet: 1 tab(s) orally once a day (23 Nov 2018 04:31)  phenytoin 200 mg oral capsule, extended release: 1 cap(s) orally every 8 hours (23 Nov 2018 04:31)  phenytoin 200 mg oral capsule, extended release: 1 cap(s) orally once a day (at bedtime) (23 Nov 2018 04:31)  thiamine 100 mg oral tablet: 1 tab(s) orally once a day (23 Nov 2018 04:31)  Tylenol 325 mg oral tablet: 2 tab(s) orally every 4 hours, As Needed (23 Nov 2018 04:31)      ROS:     Unable to obtain: patient is intubated        VITALS:   T(F): 97.3 (11-23 @ 06:05), Max: 99.7 (11-23 @ 00:16)  HR: 100 (11-23 @ 06:30) (100 - 122)  BP: 136/82 (11-23 @ 06:30) (102/76 - 136/82)  BP(mean): 105 (11-23 @ 06:30) (92 - 105)  RR: 14 (11-23 @ 06:30) (14 - 26)  SpO2: 99% (11-23 @ 06:30) (89% - 100%)    I&O's Summary    22 Nov 2018 07:01  -  23 Nov 2018 07:00  --------------------------------------------------------  IN: 300 mL / OUT: 0 mL / NET: 300 mL        PHYSICAL EXAM:  GENERAL:  Intubated, not on pressors   HEENT:  Conjunctivae  anicteric  CHEST:  Full & symmetric excursion  HEART:  N S1, S2  ABDOMEN:  Soft, non-distended, normoactive bowel sounds,  no masses   EXTREMITIES  no  edema  SKIN:  No rash  Rectal exam: Brown stools no blood or melena         LABS:                        11.1   27.53 )-----------( 171      ( 23 Nov 2018 05:06 )             33.1     PT/INR - ( 23 Nov 2018 05:06 )  INR: 1.23          PTT - ( 23 Nov 2018 00:15 )  PTT:39.5<H>  LIVER FUNCTIONS - ( 23 Nov 2018 05:06 )  Alb: 3.6 g/dL / Pro: 6.6 g/dL / ALK PHOS: 160 U/L / ALT: 57 U/L / AST: 58 U/L / GGT: x           11-23    133<L>  |  92<L>  |  42<H>  ----------------------------<  108<H>  6.0<HH>   |  20  |  2.4<H>    Ca    8.8      23 Nov 2018 05:06  Phos  7.6     11-23  Mg     2.1     11-23      CARDIAC MARKERS ( 23 Nov 2018 05:06 )  x     / <0.01 ng/mL / 94 U/L / x     / 2.1 ng/mL  CARDIAC MARKERS ( 23 Nov 2018 00:15 )  x     / <0.01 ng/mL / x     / x     / x              Previous EGD: nothing on records     Previous colonoscopy: nothing on records      RADIOLOGY:  < from: CT Angio Abdomen and Pelvis w/ IV Cont (11.23.18 @ 02:09) >    EXAM:  CT ANGIO ABD PELV (W)AW IC        EXAM:  CT ANGIO CHEST (W)AW IC            PROCEDURE DATE:  11/23/2018            INTERPRETATION:  CLINICAL STATEMENT: Shortness of breath and fall.      TECHNIQUE: Contiguous axial CTA images were obtained from the thoracic   inlet to the pubic symphysis following administration of 100c Optiray 320   intravenous contrast.  Oral contrast was not administered.  Reformatted   images in the coronal and sagittal planes were acquired.    COMPARISON CT: CT abdomen and pelvis performed 11/21/2016.    OTHER STUDIES USED FOR CORRELATION: None.       FINDINGS:    CHEST:    TUBES: Endotracheal tube in place.     PULMONARY ARTERIES: Acute pulmonary emboli within the distal right main   pulmonary artery extending into segmental arteries of the right upper,   right middle, and right lower lobes. Multiple acute pulmonary emboli   within the segmental arteries of the left upper and left lower lobes.   Small linear embolism within the main pulmonary artery. No definite CT   evidence of right heart strain.     LUNGS/PLEURA/AIRWAYS: 3 mm nodule in the right middle lobe of (series 6,   image 151). Bibasilar subsegmental atelectasis. No focal consolidations,   pneumothorax, pleural effusions. No endobronchial obstruction or   honeycombing.    MEDIASTINUM/THORACIC NODES: Unremarkable.    HEART/GREAT VESSELS: Heart is within normal limits in size. Coronary   arterial atherosclerosis. Trace pericardial fluid.Borderline dilatation   of the ascending aorta to 4.1 cm. Diffuse atherosclerotic disease. Mild   atherosclerotic disease at the origin of the celiac access with no   significant stenosis. Origins of the superior and inferior mesenteric   arteries are patent. Atherosclerotic disease in the distal superior  mesenteric artery. Severe stenosis at the origin of the left renal artery   with post stenotic dilatation. Atherosclerosis at the origin of the right   renal artery with mild stenosis.      ABDOMEN/PELVIS:    HEPATOBILIARY: Unremarkable.    SPLEEN:Unremarkable.    PANCREAS: Unremarkable.    ADRENAL GLANDS: Unremarkable.    KIDNEYS: Symmetric renal enhancement. No hydronephrosis. Stable right   lower pole cyst.    ABDOMINOPELVIC NODES: Unremarkable.    PELVIC ORGANS: Prostate calcifications. Decompressed bladder.    PERITONEUM/MESENTERY/BOWEL: No bowel obstruction or inflammation. No   pneumoperitoneum or ascites. Unremarkable appendix. Right inguinal hernia   repair. Small hiatal hernia. Fat-containing umbilical hernia.    BONES/SOFT TISSUES: Compression fracture of T4, T5, T6, and T8.   Questionable compression fractures of T7. Chronic loss of height of T9.   Acute minimally displaced right L3 transverse process fractures. Chronic   bilateral posterior rib fractures. Partially sacralized L5 vertebral   body. Degenerative changes of the thoracolumbar spine.    OTHER: Infrarenal IVC filter.      IMPRESSION:     1. Multiple bilateral acute pulmonary emboli involving all 5 lobes of the   lungs as above. No definite CT evidence of right heart strain.    2. Compression fractures of T4, T5, T6, and T8 of indeterminate age.   Minimally displaced right L3 transverse process fracture.    3. Severe stenosis at the origin of the left renal artery with   poststenotic dilatation.      Dr. Cole Macias discussed preliminary findings with Dr. Modi on   11/23/2018 2:38 AM with readback. Final report discussed with Dr. Ray Harry on 11/23/2018 6:40 AM with readback.      < end of copied text >  < from: CT Cervical Spine No Cont (11.23.18 @ 02:02) >    ******PRELIMINARY REPORT******    ******PRELIMINARY REPORT******          EXAM:  CT CERVICAL SPINE            PROCEDURE DATE:  11/23/2018          ******PRELIMINARY REPORT******    ******PRELIMINARY REPORT******          INTERPRETATION:  Clinical History / Reason for exam: Trauma.    TECHNIQUE:  Contiguous unenhanced CT axial images of the cervical spine   with coronal and sagittal re-formations.    COMPARISON: None available    FINDINGS:    Motion degraded exam.    There is no evidence of acute fracture, compression deformity or facet   subluxation of the cervical spine.     The atlantoaxial relationships are maintained.  The posterior elements   are intact. There is no significant prevertebral soft tissue swelling.   The visualized paraspinal soft tissues are unremarkable.     There are moderate multilevel degenerative changes.    Spina bifida occulta of C2.    The patient is intubated. There is a enteric tube is coiled within the   oropharynx with tip in the proximal trachea.      IMPRESSION:    Motion limited exam.    No gross fractures.    Moderate multilevel degenerative changes.    Enteric tube within the oropharynx with tip in the proximal trachea.    Dr. Cole Macias discussed preliminary findings with NIKKO MODI MD on   11/23/2018 2:20 AM with readback.            ******PRELIMINARY REPORT******    ******PRELIMINARY REPORT******          < end of copied text >

## 2018-11-23 NOTE — PATIENT PROFILE ADULT - VISION (WITH CORRECTIVE LENSES IF THE PATIENT USUALLY WEARS THEM):
unknown, patient intubated and sedated; unable to speak/Normal vision: sees adequately in most situations; can see medication labels, newsprint

## 2018-11-23 NOTE — PROGRESS NOTE ADULT - SUBJECTIVE AND OBJECTIVE BOX
57yMale    HPI:  57M, PMHx of seizure disorder, DLD, presents to the ED sp fall from NH. Pt was brought in by EMS from his nursing home due to a fall and then experienced SOB. It is unclear which event happened first, either the SOB or fall. During assessment patient was is respiratory distress and hypoxic to 80s on RA and tachypneic. Pt reports b/l lateral chest pain and difficulty breathing. Pt was intubated in the ED for airway protection. In ed, he was found to have b/l pe without strain, melina with HAGMA and lactic acidosis, coffee ground emesis. Neuro consult for age indeterminate lacunar infarcts. Patient currently intubated and sedated.    PMH:  HLD (hyperlipidemia)  HTN (hypertension)  Seizures  Unilateral inguinal hernia with obstruction and without gangrene, recurrence not specified      Tests:  < from: CT Head No Cont (11.23.18 @ 02:01) >  IMPRESSION:   In comparison to the previous head CT dated 4/20/2016:    1.  No evidence of acute intracranial hemorrhage.    2.  Interval right parietal craniotomy with underlying encephalomalacia,   correlate with surgical history.    3.  Small infarct in the right frontal lobe and small lacunar infarcts   within the basal ganglia and thalami, new since the prior exam an   indeterminate in age. Small infarct in the left frontal lobe is stable   and chronic.    4.  Moderate to severe chronic microvascular changes, moderately   progressed.  Overnight Events: None    Medications:  atorvastatin 40 milliGRAM(s) Oral at bedtime  chlorhexidine 0.12% Liquid 15 milliLiter(s) Oral Mucosa every 12 hours  chlorhexidine 4% Liquid 1 Application(s) Topical <User Schedule>  fentaNYL   Infusion 0.5 MICROgram(s)/kG/Hr IV Continuous <Continuous>  folic acid 1 milliGRAM(s) Oral daily  heparin  Infusion 1500 Unit(s)/Hr IV Continuous <Continuous>  levETIRAcetam  IVPB 1500 milliGRAM(s) IV Intermittent every 12 hours  meropenem  IVPB 500 milliGRAM(s) IV Intermittent every 12 hours  meropenem  IVPB      pantoprazole Infusion 8 mG/Hr IV Continuous <Continuous>  phenytoin  IVPB 200 milliGRAM(s) IV Intermittent four times a day  propofol Infusion 5 MICROgram(s)/kG/Min IV Continuous <Continuous>  sodium bicarbonate 650 milliGRAM(s) Oral three times a day  thiamine Injectable 100 milliGRAM(s) IV Push daily  vancomycin  IVPB 1250 milliGRAM(s) IV Intermittent daily    Vitals:  T(F): 96.3 (11-23-18 @ 12:00), Max: 99.7 (11-23-18 @ 00:16)  HR: 106 (11-23-18 @ 12:00) (92 - 122)  BP: 108/77 (11-23-18 @ 12:00) (85/64 - 136/82)  RR: 24 (11-23-18 @ 12:00) (14 - 26)  SpO2: 93% (11-23-18 @ 12:00) (89% - 100%)                        10.9   26.85 )-----------( 165      ( 23 Nov 2018 10:34 )             32.5     11-23    133<L>  |  91<L>  |  45<H>  ----------------------------<  128<H>  5.0   |  22  |  2.3<H>    Ca    8.7      23 Nov 2018 10:34  Phos  7.6     11-23  Mg     2.1     11-23    TPro  6.6  /  Alb  3.6  /  TBili  0.3  /  DBili  x   /  AST  72<H>  /  ALT  66<H>  /  AlkPhos  159<H>  11-23    PT/INR - ( 23 Nov 2018 05:06 )   PT: 14.10 sec;   INR: 1.23 ratio         PTT - ( 23 Nov 2018 00:15 )  PTT:39.5 sec  Urinalysis Basic - ( 23 Nov 2018 04:30 )    Color: Dark Yellow / Appearance: Cloudy / SG: >=1.030 / pH: x  Gluc: x / Ketone: Trace  / Bili: Negative / Urobili: 0.2   Blood: x / Protein: 100 / Nitrite: Negative   Leuk Esterase: Negative / RBC: 6-10 /HPF / WBC x   Sq Epi: x / Non Sq Epi: x / Bacteria: Moderate /HPF      LIVER FUNCTIONS - ( 23 Nov 2018 10:34 )  Alb: 3.6 g/dL / Pro: 6.6 g/dL / ALK PHOS: 159 U/L / ALT: 66 U/L / AST: 72 U/L / GGT: x             Neuro Exam:  Limited by intubation and sedation.  Not following commands but is sedated.  Sedated, PEARLA EOMI no gaze  No evidence of facial asymmetry.  Moves all extremities as per RN when off sedation.  +Gag reflex and + corneal reflex.  NIHSS 18 but limited by sedation.    Impression:  57M, PMHx of seizure disorder, DLD, presents to the ED sp fall from NH. Pt was brought in by EMS from his nursing home due to a fall and then experienced SOB. It is unclear which event happened first, either the SOB or fall. During assessment patient was is respiratory distress and hypoxic to 80s on RA and tachypneic. Pt reports b/l lateral chest pain and difficulty breathing. Pt was intubated in the ED for airway protection. In ed, he was found to have b/l pe without strain, melina with HAGMA and lactic acidosis, coffee ground emesis. Neuro consult for age indeterminate lacunar infarcts. Patient currently intubated and sedated. Discussed case with ICU team.     Suggestions:  Can continue anticoagulation from a stroke perspective provided the blood pressure is less 140/80.  Continue statin.  When stable, may perform MRI brain without jose manuel.  Call back with questions.     Iam Graham NP  [phone]

## 2018-11-23 NOTE — ED PROVIDER NOTE - PROGRESS NOTE DETAILS
Code Sepsis due to WBC elevation with tachycardia/tachypnea. Patient in respiratory distress. POCUS negative for pneumothorax. Poor initial O2 sat readings with pt on NRB. Initially placed on bipap to pre-oxygenate prior to intubation. I reassessed the patient, reviewed vital signs. heart RRR, capillary refill <2 sec, 2+ pulse, skin cool and dry. Signed off care to Dr. Alarcon who will f/u CT reads and labs and reassess D/w Dr. Hollins of nephrology who will come assess pt Spoke with Dr. Phillips, approved for ICU. Case endorsed to ICU resident.

## 2018-11-23 NOTE — H&P ADULT - PMH
HLD (hyperlipidemia)    HTN (hypertension)    Seizures    Unilateral inguinal hernia with obstruction and without gangrene, recurrence not specified

## 2018-11-23 NOTE — ED PROVIDER NOTE - ATTENDING CONTRIBUTION TO CARE
57yoM with h/o HTN, HLD, seizures, presents with fall and SOB, per EMS was witnessed fall and no head trauma or LOC, also SOB apparently began after fall. Pt reports b/l back pain, poor historian and unable to provide further hx other than agreeing to having abd pain currently. Initial report was of CHF and COPD, however no record of this or meds for this in his chart from NH. On exam, afebrile, hemodynamically stable, saturating well on NRB, grunting and tachypneic, using accessory muscles, head NCAT, EOMI grossly, anicteric, MMM, R sided JVD, tachycardic, reg rhythm, nml S1/S2, no m/r/g, lungs CTAB, no w/r/r, abd soft, mild TTP diffusely, ND, nml BS, alert, CN's 3-12 grossly intact, CHOW spontaneously, b/l LE 1+ nonpitting edema, skin cool, dry, no rashes or hives. Bedside U/S and CXR with no sign of PTX. No wheezing and good air mvmt, suggesting against COPD. Bedside U/S noted B lines with suspicion for CHF. Started on BiPAP without improvement in WOB. Intubated for respiratory failure. 57yoM with h/o HTN, HLD, seizures, presents with fall and SOB, per EMS was witnessed fall and no head trauma or LOC, also SOB apparently began after fall. Pt reports b/l back pain, poor historian and unable to provide further hx other than agreeing to having abd pain currently. Initial report was of CHF and COPD, however no record of this or meds for this in his chart from NH. On exam, afebrile, hemodynamically stable, saturating well on NRB, grunting and tachypneic, using accessory muscles, head NCAT, EOMI grossly, anicteric, MMM, R sided JVD, tachycardic, reg rhythm, nml S1/S2, no m/r/g, lungs CTAB, no w/r/r, abd soft, mild TTP diffusely, ND, nml BS, alert, CN's 3-12 grossly intact, CHOW spontaneously, b/l LE 1+ nonpitting edema, skin cool, dry, no rashes or hives. Bedside U/S and CXR with no sign of PTX. No wheezing and good air mvmt, suggesting against COPD. Bedside U/S noted B lines with suspicion for CHF. Started on BiPAP without improvement in WOB. Intubated for respiratory failure. Awaiting CT results. Of note, at this point have low suspicion for acute traumatic injury as cause of today's presentation, as such not upgraded to code trauma. Signed off care to who will f/u official CT reads and continue to assess. 57yoM with h/o HTN, HLD, seizures, presents with fall and SOB, per EMS was witnessed fall and no head trauma or LOC, also SOB apparently began after fall. Pt reports b/l back pain, poor historian and unable to provide further hx other than agreeing to having abd pain currently. Initial report was of CHF and COPD, however no record of this or meds for this in his chart from NH. On exam, afebrile, hemodynamically stable, saturating well on NRB, grunting and tachypneic, using accessory muscles, head NCAT, EOMI grossly, anicteric, MMM, R sided JVD, tachycardic, reg rhythm, nml S1/S2, no m/r/g, lungs CTAB, no w/r/r, abd soft, mild TTP diffusely, ND, nml BS, alert, CN's 3-12 grossly intact, CHOW spontaneously, b/l LE 1+ nonpitting edema, skin cool, dry, no rashes or hives. Bedside U/S and CXR with no sign of PTX. No wheezing and good air mvmt, suggesting against COPD. Bedside U/S noted B lines with suspicion for CHF. Started on BiPAP without improvement in WOB. Intubated for respiratory failure. Noted PE. Awaiting further CT results. Of note, at this point have low suspicion for acute traumatic injury as cause of today's presentation, as such not upgraded to code trauma. Signed off care to Dr. Alarcon who will f/u official CT reads and further labs and continue to assess.  Other diagnoses: fall initial encounter

## 2018-11-23 NOTE — ED PROVIDER NOTE - OBJECTIVE STATEMENT
56yo M BIBEMS from NH after fall and pt was found SOB. Unclear if SOB then fell or fell then had SOB. Arrived to ED in respiratory distress and hypoxic to 80s on RA, tachypneic with RR in 30s. Patient c/o chest pain to bilateral lateral chest and difficulty breathing. Unsure how he fell but denies head trauma or LOC. Patient unable to provide further history or ROS 2/2 respiratory distress. 56yo M with PMHx seizure disorder, DLD, BIBEMS from NH after fall and pt was found SOB. Unclear if SOB then fell or fell then had SOB. Arrived to ED in respiratory distress and hypoxic to 80s on RA, tachypneic with RR in 30s. Patient c/o chest pain to bilateral lateral chest and difficulty breathing. Unsure how he fell but denies head trauma or LOC. Patient unable to provide further history or ROS 2/2 respiratory distress.

## 2018-11-23 NOTE — CONSULT NOTE ADULT - ASSESSMENT
Patient with MEDHAT, HAGMA and hyperK brought to hospital due to fall and SOB.  PMH  seizure disorder, DLD, presents to the ED sp fall and SOB    # MEDHAT on ? CKD/ HAGMA/ hyperK   - no baseline cr. available to compare. Last baseline cr. 0.74 (2016).   - MEDHAT likely ATN oliguric due to prolonged prerenal vs Prerenal   - s. cr improving since admission   - urine out put noted, oliguric   - No immediate need for RRT, but may need RRT very soon   - repeat ABG noted for improved pH and bicarb level.   - stop bicarb drip, switch to oral bicarb   - K level noted, elevated, likely due to MEDHAT, s/p since dose of insulin around 9 am   - Repeat BMP, monitor K level and EKG very closely   - Strict I & O   - lactate level improving   - Avoid nephrotoxins and hypotension    # Coffee ground emesis   - GI on board   - notes appreciated    # Anemia   - hb noted, no need for BRIAN   - Monitor h/h, Tx if hb < 7    # Infection/ sepsis   - WBC count elevated   - cont. ABx   - check pan cultures   - consider ID consult   - check vanco trough      will follow Patient with MEDHAT, HAGMA and hyperK brought to hospital due to fall and SOB.  PMH  seizure disorder, DLD, presents to the ED sp fall and SOB    # MEDHAT on ? CKD/ HAGMA/ hyperK   - no baseline cr. available to compare. Last baseline cr. 0.74 (2016).   - MEDHAT likely ATN oliguric due to prolonged prerenal vs ATN    - s. cr improving since admission   - urine out put noted, oliguric   - No immediate need for RRT, but may need RRT very soon   - repeat ABG noted for improved pH and bicarb level.   - stop bicarb drip,    - K level noted, elevated, likely due to MEDHAT, s/p since dose of insulin around 9 am   - Repeat BMP in 2h , monitor K level and EKG very closely   - Strict I & O   - lactate level improving   - Avoid nephrotoxins and hypotension    # Coffee ground emesis   - GI on board   - notes appreciated  - Follow HB    # Anemia   - hb noted, no need for BRIAN   - Monitor h/h, Tx if hb < 7    # Infection/ sepsis/ source ?   - WBC count elevated   - cont. ABx   - check pan cultures   - consider ID consult   - check vanco trough and decraese to 1g q24h for now       will follow

## 2018-11-23 NOTE — PATIENT PROFILE ADULT - FUNCTIONAL SCREEN CURRENT LEVEL: COMMUNICATION, MLM
patient intubated and sedated; unable to speak/3 = unable to understand or speak (not related to language barrier)

## 2018-11-24 LAB
ALBUMIN SERPL ELPH-MCNC: 3.4 G/DL — LOW (ref 3.5–5.2)
ALBUMIN SERPL ELPH-MCNC: 3.5 G/DL — SIGNIFICANT CHANGE UP (ref 3.5–5.2)
ALP SERPL-CCNC: 165 U/L — HIGH (ref 30–115)
ALP SERPL-CCNC: 184 U/L — HIGH (ref 30–115)
ALT FLD-CCNC: 82 U/L — HIGH (ref 0–41)
ALT FLD-CCNC: 84 U/L — HIGH (ref 0–41)
ANION GAP SERPL CALC-SCNC: 17 MMOL/L — HIGH (ref 7–14)
ANION GAP SERPL CALC-SCNC: 17 MMOL/L — HIGH (ref 7–14)
ANION GAP SERPL CALC-SCNC: 18 MMOL/L — HIGH (ref 7–14)
APTT BLD: 60.2 SEC — HIGH (ref 27–39.2)
APTT BLD: 62 SEC — HIGH (ref 27–39.2)
APTT BLD: 68.7 SEC — HIGH (ref 27–39.2)
APTT BLD: 70.6 SEC — CRITICAL HIGH (ref 27–39.2)
AST SERPL-CCNC: 56 U/L — HIGH (ref 0–41)
AST SERPL-CCNC: 65 U/L — HIGH (ref 0–41)
BASOPHILS # BLD AUTO: 0.05 K/UL — SIGNIFICANT CHANGE UP (ref 0–0.2)
BASOPHILS NFR BLD AUTO: 0.2 % — SIGNIFICANT CHANGE UP (ref 0–1)
BILIRUB DIRECT SERPL-MCNC: 0.2 MG/DL — SIGNIFICANT CHANGE UP (ref 0–0.2)
BILIRUB INDIRECT FLD-MCNC: 0.1 MG/DL — LOW (ref 0.2–1.2)
BILIRUB SERPL-MCNC: 0.3 MG/DL — SIGNIFICANT CHANGE UP (ref 0.2–1.2)
BILIRUB SERPL-MCNC: 0.4 MG/DL — SIGNIFICANT CHANGE UP (ref 0.2–1.2)
BUN SERPL-MCNC: 54 MG/DL — HIGH (ref 10–20)
BUN SERPL-MCNC: 56 MG/DL — HIGH (ref 10–20)
BUN SERPL-MCNC: 57 MG/DL — HIGH (ref 10–20)
CALCIUM SERPL-MCNC: 8.6 MG/DL — SIGNIFICANT CHANGE UP (ref 8.5–10.1)
CALCIUM SERPL-MCNC: 8.7 MG/DL — SIGNIFICANT CHANGE UP (ref 8.5–10.1)
CALCIUM SERPL-MCNC: 8.8 MG/DL — SIGNIFICANT CHANGE UP (ref 8.5–10.1)
CHLORIDE SERPL-SCNC: 93 MMOL/L — LOW (ref 98–110)
CHLORIDE SERPL-SCNC: 93 MMOL/L — LOW (ref 98–110)
CHLORIDE SERPL-SCNC: 96 MMOL/L — LOW (ref 98–110)
CK SERPL-CCNC: 67 U/L — SIGNIFICANT CHANGE UP (ref 0–225)
CO2 SERPL-SCNC: 22 MMOL/L — SIGNIFICANT CHANGE UP (ref 17–32)
CO2 SERPL-SCNC: 22 MMOL/L — SIGNIFICANT CHANGE UP (ref 17–32)
CO2 SERPL-SCNC: 23 MMOL/L — SIGNIFICANT CHANGE UP (ref 17–32)
CREAT SERPL-MCNC: 2.1 MG/DL — HIGH (ref 0.7–1.5)
CREAT SERPL-MCNC: 2.2 MG/DL — HIGH (ref 0.7–1.5)
CREAT SERPL-MCNC: 2.2 MG/DL — HIGH (ref 0.7–1.5)
EOSINOPHIL # BLD AUTO: 0.01 K/UL — SIGNIFICANT CHANGE UP (ref 0–0.7)
EOSINOPHIL NFR BLD AUTO: 0 % — SIGNIFICANT CHANGE UP (ref 0–8)
GAS PNL BLDA: SIGNIFICANT CHANGE UP
GLUCOSE BLDC GLUCOMTR-MCNC: 150 MG/DL — HIGH (ref 70–99)
GLUCOSE SERPL-MCNC: 148 MG/DL — HIGH (ref 70–99)
GLUCOSE SERPL-MCNC: 148 MG/DL — HIGH (ref 70–99)
GLUCOSE SERPL-MCNC: 162 MG/DL — HIGH (ref 70–99)
GRAM STN FLD: SIGNIFICANT CHANGE UP
HCT VFR BLD CALC: 33.1 % — LOW (ref 42–52)
HGB BLD-MCNC: 11 G/DL — LOW (ref 14–18)
IMM GRANULOCYTES NFR BLD AUTO: 1.8 % — HIGH (ref 0.1–0.3)
INR BLD: 1.21 RATIO — SIGNIFICANT CHANGE UP (ref 0.65–1.3)
LYMPHOCYTES # BLD AUTO: 2.68 K/UL — SIGNIFICANT CHANGE UP (ref 1.2–3.4)
LYMPHOCYTES # BLD AUTO: 9.9 % — LOW (ref 20.5–51.1)
MAGNESIUM SERPL-MCNC: 2.2 MG/DL — SIGNIFICANT CHANGE UP (ref 1.8–2.4)
MCHC RBC-ENTMCNC: 29.7 PG — SIGNIFICANT CHANGE UP (ref 27–31)
MCHC RBC-ENTMCNC: 33.2 G/DL — SIGNIFICANT CHANGE UP (ref 32–37)
MCV RBC AUTO: 89.5 FL — SIGNIFICANT CHANGE UP (ref 80–94)
METHOD TYPE: SIGNIFICANT CHANGE UP
MONOCYTES # BLD AUTO: 2.79 K/UL — HIGH (ref 0.1–0.6)
MONOCYTES NFR BLD AUTO: 10.3 % — HIGH (ref 1.7–9.3)
MRSA SPEC QL CULT: SIGNIFICANT CHANGE UP
NEUTROPHILS # BLD AUTO: 21.04 K/UL — HIGH (ref 1.4–6.5)
NEUTROPHILS NFR BLD AUTO: 77.8 % — HIGH (ref 42.2–75.2)
PHOSPHATE SERPL-MCNC: 6.5 MG/DL — HIGH (ref 2.1–4.9)
PLATELET # BLD AUTO: 200 K/UL — SIGNIFICANT CHANGE UP (ref 130–400)
POTASSIUM SERPL-MCNC: 5.6 MMOL/L — HIGH (ref 3.5–5)
POTASSIUM SERPL-MCNC: 6.1 MMOL/L — CRITICAL HIGH (ref 3.5–5)
POTASSIUM SERPL-MCNC: 6.2 MMOL/L — CRITICAL HIGH (ref 3.5–5)
POTASSIUM SERPL-SCNC: 5.6 MMOL/L — HIGH (ref 3.5–5)
POTASSIUM SERPL-SCNC: 6.1 MMOL/L — CRITICAL HIGH (ref 3.5–5)
POTASSIUM SERPL-SCNC: 6.2 MMOL/L — CRITICAL HIGH (ref 3.5–5)
PROT SERPL-MCNC: 6.7 G/DL — SIGNIFICANT CHANGE UP (ref 6–8)
PROT SERPL-MCNC: 6.9 G/DL — SIGNIFICANT CHANGE UP (ref 6–8)
PROTHROM AB SERPL-ACNC: 13.9 SEC — HIGH (ref 9.95–12.87)
RBC # BLD: 3.7 M/UL — LOW (ref 4.7–6.1)
RBC # FLD: 13.7 % — SIGNIFICANT CHANGE UP (ref 11.5–14.5)
SODIUM SERPL-SCNC: 132 MMOL/L — LOW (ref 135–146)
SODIUM SERPL-SCNC: 133 MMOL/L — LOW (ref 135–146)
SODIUM SERPL-SCNC: 136 MMOL/L — SIGNIFICANT CHANGE UP (ref 135–146)
SPECIMEN SOURCE: SIGNIFICANT CHANGE UP
TROPONIN T SERPL-MCNC: <0.01 NG/ML — SIGNIFICANT CHANGE UP
VANCOMYCIN TROUGH SERPL-MCNC: 7.8 UG/ML — SIGNIFICANT CHANGE UP (ref 5–10)
WBC # BLD: 27.06 K/UL — HIGH (ref 4.8–10.8)
WBC # FLD AUTO: 27.06 K/UL — HIGH (ref 4.8–10.8)

## 2018-11-24 RX ORDER — INSULIN HUMAN 100 [IU]/ML
10 INJECTION, SOLUTION SUBCUTANEOUS ONCE
Qty: 0 | Refills: 0 | Status: COMPLETED | OUTPATIENT
Start: 2018-11-24 | End: 2018-11-24

## 2018-11-24 RX ORDER — FUROSEMIDE 40 MG
40 TABLET ORAL ONCE
Qty: 0 | Refills: 0 | Status: COMPLETED | OUTPATIENT
Start: 2018-11-24 | End: 2018-11-24

## 2018-11-24 RX ORDER — DEXTROSE 50 % IN WATER 50 %
50 SYRINGE (ML) INTRAVENOUS ONCE
Qty: 0 | Refills: 0 | Status: COMPLETED | OUTPATIENT
Start: 2018-11-24 | End: 2018-11-24

## 2018-11-24 RX ORDER — HEPARIN SODIUM 5000 [USP'U]/ML
1300 INJECTION INTRAVENOUS; SUBCUTANEOUS
Qty: 25000 | Refills: 0 | Status: DISCONTINUED | OUTPATIENT
Start: 2018-11-24 | End: 2018-11-25

## 2018-11-24 RX ORDER — INSULIN HUMAN 100 [IU]/ML
10 INJECTION, SOLUTION SUBCUTANEOUS ONCE
Qty: 0 | Refills: 0 | Status: DISCONTINUED | OUTPATIENT
Start: 2018-11-24 | End: 2018-11-24

## 2018-11-24 RX ADMIN — Medication 50 MILLILITER(S): at 06:30

## 2018-11-24 RX ADMIN — Medication 100 MILLIGRAM(S): at 11:45

## 2018-11-24 RX ADMIN — Medication 108 MILLIGRAM(S): at 17:54

## 2018-11-24 RX ADMIN — Medication 650 MILLIGRAM(S): at 05:47

## 2018-11-24 RX ADMIN — INSULIN HUMAN 10 UNIT(S): 100 INJECTION, SOLUTION SUBCUTANEOUS at 16:24

## 2018-11-24 RX ADMIN — Medication 166.67 MILLIGRAM(S): at 05:47

## 2018-11-24 RX ADMIN — HEPARIN SODIUM 15 UNIT(S)/HR: 5000 INJECTION INTRAVENOUS; SUBCUTANEOUS at 03:57

## 2018-11-24 RX ADMIN — Medication 40 MILLIGRAM(S): at 11:00

## 2018-11-24 RX ADMIN — Medication 1 MILLIGRAM(S): at 11:45

## 2018-11-24 RX ADMIN — HEPARIN SODIUM 13 UNIT(S)/HR: 5000 INJECTION INTRAVENOUS; SUBCUTANEOUS at 13:00

## 2018-11-24 RX ADMIN — CHLORHEXIDINE GLUCONATE 15 MILLILITER(S): 213 SOLUTION TOPICAL at 05:46

## 2018-11-24 RX ADMIN — INSULIN HUMAN 10 UNIT(S): 100 INJECTION, SOLUTION SUBCUTANEOUS at 06:27

## 2018-11-24 RX ADMIN — MEROPENEM 100 MILLIGRAM(S): 1 INJECTION INTRAVENOUS at 17:54

## 2018-11-24 RX ADMIN — CHLORHEXIDINE GLUCONATE 1 APPLICATION(S): 213 SOLUTION TOPICAL at 05:46

## 2018-11-24 RX ADMIN — Medication 108 MILLIGRAM(S): at 06:39

## 2018-11-24 RX ADMIN — Medication 108 MILLIGRAM(S): at 00:50

## 2018-11-24 RX ADMIN — ATORVASTATIN CALCIUM 40 MILLIGRAM(S): 80 TABLET, FILM COATED ORAL at 22:08

## 2018-11-24 RX ADMIN — Medication 650 MILLIGRAM(S): at 14:05

## 2018-11-24 RX ADMIN — CHLORHEXIDINE GLUCONATE 15 MILLILITER(S): 213 SOLUTION TOPICAL at 18:10

## 2018-11-24 RX ADMIN — Medication 650 MILLIGRAM(S): at 22:08

## 2018-11-24 RX ADMIN — LEVETIRACETAM 400 MILLIGRAM(S): 250 TABLET, FILM COATED ORAL at 05:47

## 2018-11-24 RX ADMIN — MEROPENEM 100 MILLIGRAM(S): 1 INJECTION INTRAVENOUS at 05:47

## 2018-11-24 RX ADMIN — Medication 50 MILLILITER(S): at 16:24

## 2018-11-24 RX ADMIN — LEVETIRACETAM 400 MILLIGRAM(S): 250 TABLET, FILM COATED ORAL at 17:54

## 2018-11-24 RX ADMIN — Medication 108 MILLIGRAM(S): at 11:45

## 2018-11-24 NOTE — CONSULT NOTE ADULT - SUBJECTIVE AND OBJECTIVE BOX
57y  Male  HPI:  57M, PMHx of seizure disorder, DLD, presents to the ED sp fall from NH. Pt was brought in by EMS from his nursing home due to a fall and then experienced SOB. It is unclear which event happened first, either the SOB or fall. During assessment patient was is respiratory distress and hypoxic to 80s on RA and tachypneic. Pt reports b/l lateral chest pain and difficulty breathing. Pt doesn't know how he fell, denies HT/LOC. Pt was unable to provide further history due to intubation    Pt was intubated in the ED for airway protection.   In ed, he was found to have b/l pe without strain, melina with HAGMA and lactic acidosis, coffee ground emesis. (23 Nov 2018 04:26)    Hospital course***  Allergies    No Known Allergies    Intolerances    Seafood (Unknown)    PAST MEDICAL & SURGICAL HISTORY:  HLD (hyperlipidemia)  HTN (hypertension)  Seizures  Unilateral inguinal hernia with obstruction and without gangrene, recurrence not specified  No significant past surgical history      Labs:                        11.0   27.06 )-----------( 200      ( 24 Nov 2018 04:30 )             33.1     11-24    133<L>  |  93<L>  |  56<H>  ----------------------------<  162<H>  5.6<H>   |  23  |  2.1<H>    Ca    8.6      24 Nov 2018 18:00  Phos  6.5     11-24  Mg     2.2     11-24    TPro  6.7  /  Alb  3.4<L>  /  TBili  0.4  /  DBili  x   /  AST  56<H>  /  ALT  82<H>  /  AlkPhos  184<H>  11-24      Culture - Blood (collected 23 Nov 2018 02:40)  Source: .Blood Blood  Gram Stain (24 Nov 2018 05:13):    Growth in aerobic bottle: Gram Positive Cocci in Clusters  Preliminary Report (24 Nov 2018 05:13):    Growth in aerobic bottle: Gram Positive Cocci in Clusters    "Due to technical problems, Proteus sp. will Not be reported as part of    the BCID panel until further notice"    ***Blood Panel PCR results on this specimen are available    approximately 3 hours after the Gram stain result.***    Gram stain, PCR, and/or culture results may not always    correspond due to difference in methodologies.    ************************************************************    This PCR assay was performed using [employer].    The following targets are tested for: Enterococcus,    vancomycin resistant enterococci, Listeria monocytogenes,    coagulase negative staphylococci, S. aureus,    methicillin resistant S. aureus, Streptococcus agalactiae    (Group B), S. pneumoniae, S.pyogenes (Group A),    Acinetobacter baumannii, Enterobacter cloacae, E. coli,    Klebsiella oxytoca, K. pneumoniae, Proteus sp.,    Serratia marcescens, Haemophilus influenzae,    Neisseria meningitidis, Pseudomonas aeruginosa, Candida    albicans, C. glabrata, C krusei, C parapsilosis,    C. tropicalis and the KPC resistance gene.  Organism: Blood Culture PCR (24 Nov 2018 06:34)  Organism: Blood Culture PCR (24 Nov 2018 06:34)    Culture - Blood (collected 23 Nov 2018 02:40)  Source: .Blood Blood  Preliminary Report (24 Nov 2018 13:02):    No growth to date.        PE:  PHYSICAL EXAM:  Pt intubated  full thickness wound to sacrum with granulation tissue, no erythema, no swelling

## 2018-11-24 NOTE — PROGRESS NOTE ADULT - SUBJECTIVE AND OBJECTIVE BOX
Over Night Events:no events        ROS:  See HPI    PHYSICAL EXAM    ICU Vital Signs Last 24 Hrs  T(C): 37.7 (24 Nov 2018 12:00), Max: 37.9 (24 Nov 2018 09:00)  T(F): 99.9 (24 Nov 2018 12:00), Max: 100.2 (24 Nov 2018 09:00)  HR: 122 (24 Nov 2018 14:00) (116 - 126)  BP: 91/61 (24 Nov 2018 14:00) (80/58 - 111/68)  BP(mean): 74 (24 Nov 2018 14:00) (73 - 92)  ABP: --  ABP(mean): --  RR: 23 (24 Nov 2018 14:00) (7 - 26)  SpO2: 100% (24 Nov 2018 14:00) (100% - 100%)        11-23-18 @ 07:01  -  11-24-18 @ 07:00  --------------------------------------------------------  IN: 3111.5 mL / OUT: 733 mL / NET: 2378.5 mL    11-24-18 @ 07:01  -  11-24-18 @ 15:24  --------------------------------------------------------  IN: 620 mL / OUT: 545 mL / NET: 75 mL        General:  HEENT:                Lymph Nodes: NO cervical LN   Lungs: Bilateral BS  Cardiovascular: Regular   Abdomen: Soft, Positive BS  Extremities: No clubbing   Skin:   Neurological:       LABS:                          11.0   27.06 )-----------( 200      ( 24 Nov 2018 04:30 )             33.1                                               11-24    136  |  96<L>  |  57<H>  ----------------------------<  148<H>  6.1<HH>   |  22  |  2.2<H>    Ca    8.8      24 Nov 2018 12:13  Phos  6.5     11-24  Mg     2.2     11-24    TPro  6.7  /  Alb  3.4<L>  /  TBili  0.4  /  DBili  x   /  AST  56<H>  /  ALT  82<H>  /  AlkPhos  184<H>  11-24      PT/INR - ( 24 Nov 2018 04:30 )   PT: 13.90 sec;   INR: 1.21 ratio         PTT - ( 24 Nov 2018 12:13 )  PTT:62.0 sec                                       Urinalysis Basic - ( 23 Nov 2018 04:30 )    Color: Dark Yellow / Appearance: Cloudy / SG: >=1.030 / pH: x  Gluc: x / Ketone: Trace  / Bili: Negative / Urobili: 0.2   Blood: x / Protein: 100 / Nitrite: Negative   Leuk Esterase: Negative / RBC: 6-10 /HPF / WBC x   Sq Epi: x / Non Sq Epi: x / Bacteria: Moderate /HPF        CARDIAC MARKERS ( 24 Nov 2018 04:30 )  x     / <0.01 ng/mL / 67 U/L / x     / x      CARDIAC MARKERS ( 23 Nov 2018 05:06 )  x     / <0.01 ng/mL / 94 U/L / x     / 2.1 ng/mL  CARDIAC MARKERS ( 23 Nov 2018 00:15 )  x     / <0.01 ng/mL / x     / x     / x                                                LIVER FUNCTIONS - ( 24 Nov 2018 12:13 )  Alb: 3.4 g/dL / Pro: 6.7 g/dL / ALK PHOS: 184 U/L / ALT: 82 U/L / AST: 56 U/L / GGT: x                                                  Culture - Blood (collected 23 Nov 2018 02:40)  Source: .Blood Blood  Gram Stain (24 Nov 2018 05:13):    Growth in aerobic bottle: Gram Positive Cocci in Clusters  Preliminary Report (24 Nov 2018 05:13):    Growth in aerobic bottle: Gram Positive Cocci in Clusters    "Due to technical problems, Proteus sp. will Not be reported as part of    the BCID panel until further notice"    ***Blood Panel PCR results on this specimen are available    approximately 3 hours after the Gram stain result.***    Gram stain, PCR, and/or culture results may not always    correspond due to difference in methodologies.    ************************************************************    This PCR assay was performed using [employer].    The following targets are tested for: Enterococcus,    vancomycin resistant enterococci, Listeria monocytogenes,    coagulase negative staphylococci, S. aureus,    methicillin resistant S. aureus, Streptococcus agalactiae    (Group B), S. pneumoniae, S.pyogenes (Group A),    Acinetobacter baumannii, Enterobacter cloacae, E. coli,    Klebsiella oxytoca, K. pneumoniae, Proteus sp.,    Serratia marcescens, Haemophilus influenzae,    Neisseria meningitidis, Pseudomonas aeruginosa, Candida    albicans, C. glabrata, C krusei, C parapsilosis,    C. tropicalis and the KPC resistance gene.  Organism: Blood Culture PCR (24 Nov 2018 06:34)  Organism: Blood Culture PCR (24 Nov 2018 06:34)    Culture - Blood (collected 23 Nov 2018 02:40)  Source: .Blood Blood  Preliminary Report (24 Nov 2018 13:02):    No growth to date.                                                   Mode: AC/ CMV (Assist Control/ Continuous Mandatory Ventilation)  RR (machine): 14  TV (machine): 450  FiO2: 30  PEEP: 5  ITime: 1  MAP: 7  PIP: 18                                      ABG - ( 24 Nov 2018 08:17 )  pH, Arterial: 7.32  pH, Blood: x     /  pCO2: 43    /  pO2: 112   / HCO3: 22    / Base Excess: -4.2  /  SaO2: 97                  MEDICATIONS  (STANDING):  atorvastatin 40 milliGRAM(s) Oral at bedtime  chlorhexidine 0.12% Liquid 15 milliLiter(s) Oral Mucosa every 12 hours  chlorhexidine 4% Liquid 1 Application(s) Topical <User Schedule>  fentaNYL   Infusion 0.5 MICROgram(s)/kG/Hr (4.25 mL/Hr) IV Continuous <Continuous>  folic acid 1 milliGRAM(s) Oral daily  heparin  Infusion 1300 Unit(s)/Hr (13 mL/Hr) IV Continuous <Continuous>  levETIRAcetam  IVPB 1500 milliGRAM(s) IV Intermittent every 12 hours  meropenem  IVPB 500 milliGRAM(s) IV Intermittent every 12 hours  meropenem  IVPB      pantoprazole Infusion 8 mG/Hr (10 mL/Hr) IV Continuous <Continuous>  phenytoin  IVPB 200 milliGRAM(s) IV Intermittent four times a day  propofol Infusion 5 MICROgram(s)/kG/Min (2.55 mL/Hr) IV Continuous <Continuous>  sodium bicarbonate 650 milliGRAM(s) Oral three times a day  thiamine Injectable 100 milliGRAM(s) IV Push daily  vancomycin  IVPB 1250 milliGRAM(s) IV Intermittent daily    MEDICATIONS  (PRN):      Xrays:                                                                                     ECHO

## 2018-11-24 NOTE — PROGRESS NOTE ADULT - SUBJECTIVE AND OBJECTIVE BOX
SUBJECTIVE:    Patient is a 57y old Male who presents with a chief complaint of sob and fall (24 Nov 2018 09:23)    Currently admitted to medicine with the primary diagnosis of Acute respiratory failure with hypoxia     Today is hospital day 1d.   OVERNIGHT EVENTS no acute events  Intubated, sedated with propofol and fentanyl. Blood cx + for MRSA. Vanc trough noted.     PAST MEDICAL & SURGICAL HISTORY  HLD (hyperlipidemia)  HTN (hypertension)  Seizures  Unilateral inguinal hernia with obstruction and without gangrene, recurrence not specified  No significant past surgical history          ALLERGIES:  No Known Allergies    MEDICATIONS:  STANDING MEDICATIONS  atorvastatin 40 milliGRAM(s) Oral at bedtime  chlorhexidine 0.12% Liquid 15 milliLiter(s) Oral Mucosa every 12 hours  chlorhexidine 4% Liquid 1 Application(s) Topical <User Schedule>  fentaNYL   Infusion 0.5 MICROgram(s)/kG/Hr IV Continuous <Continuous>  folic acid 1 milliGRAM(s) Oral daily  furosemide   Injectable 40 milliGRAM(s) IV Push once  heparin  Infusion 1500 Unit(s)/Hr IV Continuous <Continuous>  levETIRAcetam  IVPB 1500 milliGRAM(s) IV Intermittent every 12 hours  meropenem  IVPB 500 milliGRAM(s) IV Intermittent every 12 hours  meropenem  IVPB      pantoprazole Infusion 8 mG/Hr IV Continuous <Continuous>  phenytoin  IVPB 200 milliGRAM(s) IV Intermittent four times a day  propofol Infusion 5 MICROgram(s)/kG/Min IV Continuous <Continuous>  sodium bicarbonate 650 milliGRAM(s) Oral three times a day  thiamine Injectable 100 milliGRAM(s) IV Push daily  vancomycin  IVPB 1250 milliGRAM(s) IV Intermittent daily    PRN MEDICATIONS    VITALS:   T(F): 100.2  HR: 122  BP: 106/73  RR: 12  SpO2: 100%          LABS:                        11.0   27.06 )-----------( 200      ( 24 Nov 2018 04:30 )             33.1     11-24    132<L>  |  93<L>  |  54<H>  ----------------------------<  148<H>  6.2<HH>   |  22  |  2.2<H>    Ca    8.7      24 Nov 2018 04:30  Phos  6.5     11-24  Mg     2.2     11-24    TPro  6.9  /  Alb  3.5  /  TBili  0.3  /  DBili  0.2  /  AST  65<H>  /  ALT  84<H>  /  AlkPhos  165<H>  11-24    PT/INR - ( 24 Nov 2018 04:30 )   PT: 13.90 sec;   INR: 1.21 ratio         PTT - ( 24 Nov 2018 04:30 )  PTT:68.7 sec  Urinalysis Basic - ( 23 Nov 2018 04:30 )    Color: Dark Yellow / Appearance: Cloudy / SG: >=1.030 / pH: x  Gluc: x / Ketone: Trace  / Bili: Negative / Urobili: 0.2   Blood: x / Protein: 100 / Nitrite: Negative   Leuk Esterase: Negative / RBC: 6-10 /HPF / WBC x   Sq Epi: x / Non Sq Epi: x / Bacteria: Moderate /HPF      ABG - ( 24 Nov 2018 08:17 )  pH, Arterial: 7.32  pH, Blood: x     /  pCO2: 43    /  pO2: 112   / HCO3: 22    / Base Excess: -4.2  /  SaO2: 97                Creatine Kinase, Serum: 67 U/L (11-24-18 @ 04:30)  Troponin T, Serum: <0.01 ng/mL (11-24-18 @ 04:30)      Culture - Blood (collected 23 Nov 2018 02:40)  Source: .Blood Blood  Gram Stain (24 Nov 2018 05:13):    Growth in aerobic bottle: Gram Positive Cocci in Clusters  Preliminary Report (24 Nov 2018 05:13):    Growth in aerobic bottle: Gram Positive Cocci in Clusters    "Due to technical problems, Proteus sp. will Not be reported as part of    the BCID panel until further notice"    ***Blood Panel PCR results on this specimen are available    approximately 3 hours after the Gram stain result.***    Gram stain, PCR, and/or culture results may not always    correspond due to difference in methodologies.    ************************************************************    This PCR assay was performed using [employer].    The following targets are tested for: Enterococcus,    vancomycin resistant enterococci, Listeria monocytogenes,    coagulase negative staphylococci, S. aureus,    methicillin resistant S. aureus, Streptococcus agalactiae    (Group B), S. pneumoniae, S.pyogenes (Group A),    Acinetobacter baumannii, Enterobacter cloacae, E. coli,    Klebsiella oxytoca, K. pneumoniae, Proteus sp.,    Serratia marcescens, Haemophilus influenzae,    Neisseria meningitidis, Pseudomonas aeruginosa, Candida    albicans, C. glabrata, C krusei, C parapsilosis,    C. tropicalis and the KPC resistance gene.  Organism: Blood Culture PCR (24 Nov 2018 06:34)  Organism: Blood Culture PCR (24 Nov 2018 06:34)      CARDIAC MARKERS ( 24 Nov 2018 04:30 )  x     / <0.01 ng/mL / 67 U/L / x     / x      CARDIAC MARKERS ( 23 Nov 2018 05:06 )  x     / <0.01 ng/mL / 94 U/L / x     / 2.1 ng/mL  CARDIAC MARKERS ( 23 Nov 2018 00:15 )  x     / <0.01 ng/mL / x     / x     / x          RADIOLOGY:    PHYSICAL EXAM:  GEN: NAD, sedated, intubated  LUNGS: CTAB  HEART: rrr s1s2 present  ABD: soft nontender nondistended  EXT: b/l edema, color change  NEURO: sedated

## 2018-11-24 NOTE — CONSULT NOTE ADULT - ASSESSMENT
ASSESSMENT:  Stage III  pressure ulcer     RECOMMENDATION:  Wound care - Hydrogel/DPD BID  IV abx as indicated/ per ID  Offloading/ positional changes  Will follow.

## 2018-11-24 NOTE — PROGRESS NOTE ADULT - ATTENDING COMMENTS
11/24 pt remains intubated ///cxr ok tube in good positionlast blood cult neg  wbc decreased////ck vanco level///continue heparin

## 2018-11-24 NOTE — PROGRESS NOTE ADULT - ASSESSMENT
Patient with MEDHAT, HAGMA and hyperK brought to hospital due to fall and SOB.  PMH  seizure disorder, DLD, presents to the ED sp fall and SOB    # MEDHAT on ? CKD/ HAGMA/ hyperK   - no baseline cr. available to compare. Last baseline cr. 0.74 (2016).   - MEDHAT likely ATN oliguric due to prolonged prerenal vs ATN    - s. cr improving since admission   - urine out put noted, oliguric   - No immediate need for RRT, but may need RRT very soon   - repeat ABG noted for improved pH and bicarb level.   - stop bicarb drip,    - K level noted, elevated, likely due to MEDHAT, s/p since dose of insulin around 9 am   - Repeat BMP in 2h , monitor K level and EKG very closely   - Strict I & O   - lactate level improving   - Avoid nephrotoxins and hypotension    # Coffee ground emesis   - GI on board   - notes appreciated  - Follow HB    # Anemia   - hb noted, no need for BRIAN   - Monitor h/h, Tx if hb < 7    # Infection/ sepsis/ source ?   - WBC count elevated   - cont. ABx   - check pan cultures   - consider ID consult   - check vanco trough and decraese to 1g q24h for now       will follow Patient with MEDHAT, HAGMA and hyperK brought to hospital due to fall and SOB.  PMH  seizure disorder, DLD, presents to the ED sp fall and SOB    # MEDHAT on ? CKD/ HAGMA/ hyperK   - no baseline cr. available to compare. Last baseline cr. 0.74 (2016).   - MEDHAT likely ATN oliguric due to prolonged prerenal vs ATN    - s. cr stable    - urine out put noted, oliguric   - No immediate need for RRT, but may need RRT very soon   - consider lasix IV one time 40 mg    - K level noted, elevated, likely due to MEDHAT, s/p D50 and insulin for lasix now follow repeat BMP    - Strict I & O   - lactate level improving   - Avoid nephrotoxins and hypotension    # Coffee ground emesis   - GI on board   - notes appreciated  - Follow HB  - Off AC     # Anemia   - hb noted, no need for BRIAN   - Monitor h/h, Tx if hb < 7    # Infection/ sepsis/ source ?   - WBC count elevated   - cont. ABx   - check pan cultures   - consider ID consult  - Vanco trough noted low, keep current ID eval    # PE/ DVT off AC / vascular on case       will follow

## 2018-11-24 NOTE — PROGRESS NOTE ADULT - ASSESSMENT
57M, PMHx of seizure disorder, DLD, presents to the ED sp fall from NH. Pt was brought in by EMS from his nursing home due to a fall and then experienced SOB.  Pt reported b/l lateral chest pain and difficulty breathing.X Patient was is respiratory distress and hypoxic to 80s on RA and tachypneic, and was intubated for airway protection. In ED, he was found to have b/l pe without strain, melina with HAGMA and lactic acidosis, coffee ground emesis. Admitted to CCU. No further episodes of emesis. Sedated with propofol and fentanyl. Blood culture positive for MRSA, on jordi and vanc. Vanc trough noted.     # Acute hypoxic respiratory failure 2/2 b/l PE with no RV strain   - c/w with vent, sedation with propofol and fentanyl as needed  - change vent settings accordingly   - hob @ 45 degrees  - on heparin drip  - f/u echo    # MRSA bacteremia   - blood cx + for gram positive cocci   - WBC count elevated  - c/w jordi and vanc  - consider ID consult    # Extensive DVT   - on heparin drip   - f/u ptt goal 50-60  - vascular on board: c/w heparin drip    # CVA  - Neuro on board: brain MRI. maintain ptt 50-60, keep BP ~140     # Renal failure  - Nephro following: no immediate need for RRT, but may need it soon     --IV lasix 40 once, repeat bmp at 4 pm     #stage 2 sacral ulcer  - does not appear to be infected  - wound care per nursing   - burn eval    #coffee ground emesis  - GI on board: protonix drip, ok to start hep drip, will consider EGD when more stable   - f/u h&h, stable     CODE STATUS: FULL CODE  (family contacted, wants pt full code)   PALLIATIVE CARE EVAL  POOR PROGNOSIS

## 2018-11-24 NOTE — PROGRESS NOTE ADULT - SUBJECTIVE AND OBJECTIVE BOX
Nephrology progress note    Patient is seen and examined, events over the last 24 h noted .    Allergies:  No Known Allergies  Seafood (Unknown)    Hospital Medications:   MEDICATIONS  (STANDING):  atorvastatin 40 milliGRAM(s) Oral at bedtime  fentaNYL   Infusion 0.5 MICROgram(s)/kG/Hr (4.25 mL/Hr) IV Continuous <Continuous>  folic acid 1 milliGRAM(s) Oral daily  heparin  Infusion 1500 Unit(s)/Hr (15 mL/Hr) IV Continuous <Continuous>  levETIRAcetam  IVPB 1500 milliGRAM(s) IV Intermittent every 12 hours  meropenem  IVPB 500 milliGRAM(s) IV Intermittent every 12 hours     pantoprazole Infusion 8 mG/Hr (10 mL/Hr) IV Continuous <Continuous>  phenytoin  IVPB 200 milliGRAM(s) IV Intermittent four times a day  propofol Infusion 5 MICROgram(s)/kG/Min (2.55 mL/Hr) IV Continuous <Continuous>  sodium bicarbonate 650 milliGRAM(s) Oral three times a day  thiamine Injectable 100 milliGRAM(s) IV Push daily  vancomycin  IVPB 1250 milliGRAM(s) IV Intermittent daily        VITALS:  T(F): 100.2 (11-24-18 @ 09:00), Max: 100.2 (11-24-18 @ 09:00)  HR: 122 (11-24-18 @ 09:00)  BP: 106/73 (11-24-18 @ 09:00)  RR: 12 (11-24-18 @ 09:00)  SpO2: 100% (11-24-18 @ 09:00)      11-22 @ 07:01  -  11-23 @ 07:00  --------------------------------------------------------  IN: 300 mL / OUT: 0 mL / NET: 300 mL    11-23 @ 07:01  -  11-24 @ 07:00  --------------------------------------------------------  IN: 3111.5 mL / OUT: 733 mL / NET: 2378.5 mL    11-24 @ 07:01  -  11-24 @ 09:24  --------------------------------------------------------  IN: 106.5 mL / OUT: 90 mL / NET: 16.5 mL          PHYSICAL EXAM:  Constitutional: NAD  HEENT: anicteric sclera, oropharynx clear, MMM  Neck: No JVD  Respiratory: CTAB, no wheezes, rales or rhonchi  Cardiovascular: S1, S2, RRR  Gastrointestinal: BS+, soft, NT/ND  Extremities: No cyanosis or clubbing. No peripheral edema  :  No mckeon.   Skin: No rashes    LABS:  11-24    132<L>  |  93<L>  |  54<H>  ----------------------------<  148<H>  6.2<HH>   |  22  |  2.2<H>  Creatinine Trend: 2.2<--, 2.4<--, 2.3<--, 2.4<--, 3.1<--  Potassium Trend: 6.2<--, 5.4<--, 5.0<--, 6.0<--, 6.1<--  Ca    8.7      24 Nov 2018 04:30  Phos  6.5     11-24  Mg     2.2     11-24    TPro  6.9  /  Alb  3.5  /  TBili  0.3  /  DBili  0.2  /  AST  65<H>  /  ALT  84<H>  /  AlkPhos  165<H>  11-24                          11.0   27.06 )-----------( 200      ( 24 Nov 2018 04:30 )             33.1       Urine Studies:  Urinalysis Basic - ( 23 Nov 2018 04:30 )    Color: Dark Yellow / Appearance: Cloudy / SG: >=1.030 / pH:   Gluc:  / Ketone: Trace  / Bili: Negative / Urobili: 0.2   Blood:  / Protein: 100 / Nitrite: Negative   Leuk Esterase: Negative / RBC: 6-10 /HPF / WBC    Sq Epi:  / Non Sq Epi:  / Bacteria: Moderate /HPF        RADIOLOGY & ADDITIONAL STUDIES: Nephrology progress note    Patient is seen and examined, events over the last 24 h noted .  still intubated on MV   No major events     Allergies:  No Known Allergies  Seafood (Unknown)    Hospital Medications:   MEDICATIONS  (STANDING):  atorvastatin 40 milliGRAM(s) Oral at bedtime  fentaNYL   Infusion 0.5 MICROgram(s)/kG/Hr (4.25 mL/Hr) IV Continuous <Continuous>  folic acid 1 milliGRAM(s) Oral daily  heparin  Infusion 1500 Unit(s)/Hr (15 mL/Hr) IV Continuous <Continuous>  levETIRAcetam  IVPB 1500 milliGRAM(s) IV Intermittent every 12 hours  meropenem  IVPB 500 milliGRAM(s) IV Intermittent every 12 hours     pantoprazole Infusion 8 mG/Hr (10 mL/Hr) IV Continuous <Continuous>  phenytoin  IVPB 200 milliGRAM(s) IV Intermittent four times a day  propofol Infusion 5 MICROgram(s)/kG/Min (2.55 mL/Hr) IV Continuous <Continuous>  sodium bicarbonate 650 milliGRAM(s) Oral three times a day  thiamine Injectable 100 milliGRAM(s) IV Push daily  vancomycin  IVPB 1250 milliGRAM(s) IV Intermittent daily        VITALS:  T(F): 100.2 (11-24-18 @ 09:00), Max: 100.2 (11-24-18 @ 09:00)  HR: 122 (11-24-18 @ 09:00)  BP: 106/73 (11-24-18 @ 09:00)  RR: 12 (11-24-18 @ 09:00)  SpO2: 100% (11-24-18 @ 09:00)      11-22 @ 07:01  -  11-23 @ 07:00  --------------------------------------------------------  IN: 300 mL / OUT: 0 mL / NET: 300 mL    11-23 @ 07:01  -  11-24 @ 07:00  --------------------------------------------------------  IN: 3111.5 mL / OUT: 733 mL / NET: 2378.5 mL    11-24 @ 07:01  -  11-24 @ 09:24  --------------------------------------------------------  IN: 106.5 mL / OUT: 90 mL / NET: 16.5 mL          PHYSICAL EXAM:  Constitutional: NAD  HEENT: anicteric sclera, oropharynx clear, MMM  Neck: No JVD  Respiratory: CTAB, no wheezes, rales or rhonchi  Cardiovascular: S1, S2, RRR  Gastrointestinal: BS+, soft, NT/ND  Extremities: No cyanosis or clubbing. No peripheral edema  :  No mckeon.   Skin: No rashes    LABS:  11-24    132<L>  |  93<L>  |  54<H>  ----------------------------<  148<H>  6.2<HH>   |  22  |  2.2<H>    Creatinine Trend: 2.2<--, 2.4<--, 2.3<--, 2.4<--, 3.1<--  Potassium Trend: 6.2<--, 5.4<--, 5.0<--, 6.0<--, 6.1<--    Ca    8.7      24 Nov 2018 04:30  Phos  6.5     11-24  Mg     2.2     11-24    TPro  6.9  /  Alb  3.5  /  TBili  0.3  /  DBili  0.2  /  AST  65<H>  /  ALT  84<H>  /  AlkPhos  165<H>  11-24                          11.0   27.06 )-----------( 200      ( 24 Nov 2018 04:30 )             33.1     Vancomycin Level, Trough: 7.8: Vancomycin trough levels should be rapidly reached and maintained at  15-20 ug/ml for life threatening MRSA  infections such as sepsis, endocarditis, osteomyelitis and pneumonia. A  first trough level should be drawn  before the 3rd or 4th dose.  Risk of renal toxicity is increased for levels >15 ug/ml, in patients on  other nephrotoxic drugs, who are  hemodynamically unstable, have unstable renal function, or are on  Vancomycin therapy for >14 days. Renal function with  creatinine levels should be monitored for those patients. ug/mL (11.24.18 @ 07:00)      Urine Studies:  Urinalysis Basic - ( 23 Nov 2018 04:30 )    Color: Dark Yellow / Appearance: Cloudy / SG: >=1.030 / pH:   Gluc:  / Ketone: Trace  / Bili: Negative / Urobili: 0.2   Blood:  / Protein: 100 / Nitrite: Negative   Leuk Esterase: Negative / RBC: 6-10 /HPF / WBC    Sq Epi:  / Non Sq Epi:  / Bacteria: Moderate /HPF        RADIOLOGY & ADDITIONAL STUDIES:

## 2018-11-25 LAB
ALBUMIN SERPL ELPH-MCNC: 3.4 G/DL — LOW (ref 3.5–5.2)
ALP SERPL-CCNC: 164 U/L — HIGH (ref 30–115)
ALT FLD-CCNC: 69 U/L — HIGH (ref 0–41)
ANION GAP SERPL CALC-SCNC: 15 MMOL/L — HIGH (ref 7–14)
APTT BLD: 43 SEC — HIGH (ref 27–39.2)
APTT BLD: 43.1 SEC — HIGH (ref 27–39.2)
APTT BLD: 50.6 SEC — HIGH (ref 27–39.2)
AST SERPL-CCNC: 41 U/L — SIGNIFICANT CHANGE UP (ref 0–41)
BASE EXCESS BLDA CALC-SCNC: -0.6 MMOL/L — SIGNIFICANT CHANGE UP (ref -2–2)
BASOPHILS # BLD AUTO: 0.05 K/UL — SIGNIFICANT CHANGE UP (ref 0–0.2)
BASOPHILS NFR BLD AUTO: 0.2 % — SIGNIFICANT CHANGE UP (ref 0–1)
BILIRUB SERPL-MCNC: 0.3 MG/DL — SIGNIFICANT CHANGE UP (ref 0.2–1.2)
BUN SERPL-MCNC: 57 MG/DL — HIGH (ref 10–20)
CALCIUM SERPL-MCNC: 8.7 MG/DL — SIGNIFICANT CHANGE UP (ref 8.5–10.1)
CHLORIDE SERPL-SCNC: 93 MMOL/L — LOW (ref 98–110)
CO2 SERPL-SCNC: 25 MMOL/L — SIGNIFICANT CHANGE UP (ref 17–32)
CREAT SERPL-MCNC: 1.6 MG/DL — HIGH (ref 0.7–1.5)
EOSINOPHIL # BLD AUTO: 0.02 K/UL — SIGNIFICANT CHANGE UP (ref 0–0.7)
EOSINOPHIL NFR BLD AUTO: 0.1 % — SIGNIFICANT CHANGE UP (ref 0–8)
GLUCOSE SERPL-MCNC: 154 MG/DL — HIGH (ref 70–99)
HCO3 BLDA-SCNC: 26 MMOL/L — SIGNIFICANT CHANGE UP (ref 21–29)
HCT VFR BLD CALC: 29.8 % — LOW (ref 42–52)
HGB BLD-MCNC: 9.9 G/DL — LOW (ref 14–18)
HOROWITZ INDEX BLDA+IHG-RTO: 30 — SIGNIFICANT CHANGE UP
IMM GRANULOCYTES NFR BLD AUTO: 3.5 % — HIGH (ref 0.1–0.3)
LEVETIRACETAM SERPL-MCNC: 30.2 MCG/ML — SIGNIFICANT CHANGE UP (ref 12–46)
LYMPHOCYTES # BLD AUTO: 10.1 % — LOW (ref 20.5–51.1)
LYMPHOCYTES # BLD AUTO: 2.5 K/UL — SIGNIFICANT CHANGE UP (ref 1.2–3.4)
MAGNESIUM SERPL-MCNC: 2.6 MG/DL — HIGH (ref 1.8–2.4)
MCHC RBC-ENTMCNC: 29.7 PG — SIGNIFICANT CHANGE UP (ref 27–31)
MCHC RBC-ENTMCNC: 33.2 G/DL — SIGNIFICANT CHANGE UP (ref 32–37)
MCV RBC AUTO: 89.5 FL — SIGNIFICANT CHANGE UP (ref 80–94)
MONOCYTES # BLD AUTO: 2.62 K/UL — HIGH (ref 0.1–0.6)
MONOCYTES NFR BLD AUTO: 10.6 % — HIGH (ref 1.7–9.3)
NEUTROPHILS # BLD AUTO: 18.69 K/UL — HIGH (ref 1.4–6.5)
NEUTROPHILS NFR BLD AUTO: 75.5 % — HIGH (ref 42.2–75.2)
NRBC # BLD: 0 /100 WBCS — SIGNIFICANT CHANGE UP (ref 0–0)
NT-PROBNP SERPL-SCNC: 127 PG/ML — SIGNIFICANT CHANGE UP (ref 0–300)
PCO2 BLDA: 54 MMHG — HIGH (ref 38–42)
PH BLDA: 7.3 — LOW (ref 7.38–7.42)
PHENYTOIN FREE SERPL-MCNC: 18.6 UG/ML — SIGNIFICANT CHANGE UP (ref 10–20)
PHOSPHATE SERPL-MCNC: 5.1 MG/DL — HIGH (ref 2.1–4.9)
PLATELET # BLD AUTO: 213 K/UL — SIGNIFICANT CHANGE UP (ref 130–400)
PO2 BLDA: 94 MMHG — SIGNIFICANT CHANGE UP (ref 78–95)
POTASSIUM SERPL-MCNC: 5.2 MMOL/L — HIGH (ref 3.5–5)
POTASSIUM SERPL-SCNC: 5.2 MMOL/L — HIGH (ref 3.5–5)
PROT SERPL-MCNC: 6.2 G/DL — SIGNIFICANT CHANGE UP (ref 6–8)
RBC # BLD: 3.33 M/UL — LOW (ref 4.7–6.1)
RBC # FLD: 14 % — SIGNIFICANT CHANGE UP (ref 11.5–14.5)
SAO2 % BLDA: 97 % — HIGH (ref 92–96)
SODIUM SERPL-SCNC: 133 MMOL/L — LOW (ref 135–146)
WBC # BLD: 24.74 K/UL — HIGH (ref 4.8–10.8)
WBC # FLD AUTO: 24.74 K/UL — HIGH (ref 4.8–10.8)

## 2018-11-25 RX ORDER — DOCUSATE SODIUM 100 MG
100 CAPSULE ORAL
Qty: 0 | Refills: 0 | Status: DISCONTINUED | OUTPATIENT
Start: 2018-11-25 | End: 2018-12-13

## 2018-11-25 RX ORDER — SENNA PLUS 8.6 MG/1
2 TABLET ORAL AT BEDTIME
Qty: 0 | Refills: 0 | Status: DISCONTINUED | OUTPATIENT
Start: 2018-11-25 | End: 2018-12-13

## 2018-11-25 RX ORDER — HEPARIN SODIUM 5000 [USP'U]/ML
14.5 INJECTION INTRAVENOUS; SUBCUTANEOUS
Qty: 25000 | Refills: 0 | Status: DISCONTINUED | OUTPATIENT
Start: 2018-11-25 | End: 2018-11-26

## 2018-11-25 RX ADMIN — Medication 108 MILLIGRAM(S): at 00:04

## 2018-11-25 RX ADMIN — HEPARIN SODIUM 16 UNIT(S)/HR: 5000 INJECTION INTRAVENOUS; SUBCUTANEOUS at 20:00

## 2018-11-25 RX ADMIN — LEVETIRACETAM 400 MILLIGRAM(S): 250 TABLET, FILM COATED ORAL at 06:18

## 2018-11-25 RX ADMIN — HEPARIN SODIUM 13.5 UNIT(S)/HR: 5000 INJECTION INTRAVENOUS; SUBCUTANEOUS at 01:07

## 2018-11-25 RX ADMIN — SENNA PLUS 2 TABLET(S): 8.6 TABLET ORAL at 22:22

## 2018-11-25 RX ADMIN — MEROPENEM 100 MILLIGRAM(S): 1 INJECTION INTRAVENOUS at 17:35

## 2018-11-25 RX ADMIN — Medication 108 MILLIGRAM(S): at 06:18

## 2018-11-25 RX ADMIN — SENNA PLUS 2 TABLET(S): 8.6 TABLET ORAL at 11:28

## 2018-11-25 RX ADMIN — ATORVASTATIN CALCIUM 40 MILLIGRAM(S): 80 TABLET, FILM COATED ORAL at 22:22

## 2018-11-25 RX ADMIN — CHLORHEXIDINE GLUCONATE 15 MILLILITER(S): 213 SOLUTION TOPICAL at 06:18

## 2018-11-25 RX ADMIN — CHLORHEXIDINE GLUCONATE 15 MILLILITER(S): 213 SOLUTION TOPICAL at 17:35

## 2018-11-25 RX ADMIN — Medication 650 MILLIGRAM(S): at 22:22

## 2018-11-25 RX ADMIN — Medication 1 MILLIGRAM(S): at 11:28

## 2018-11-25 RX ADMIN — Medication 166.67 MILLIGRAM(S): at 06:18

## 2018-11-25 RX ADMIN — Medication 108 MILLIGRAM(S): at 17:35

## 2018-11-25 RX ADMIN — Medication 650 MILLIGRAM(S): at 06:17

## 2018-11-25 RX ADMIN — Medication 100 MILLIGRAM(S): at 11:29

## 2018-11-25 RX ADMIN — CHLORHEXIDINE GLUCONATE 1 APPLICATION(S): 213 SOLUTION TOPICAL at 06:17

## 2018-11-25 RX ADMIN — Medication 100 MILLIGRAM(S): at 11:28

## 2018-11-25 RX ADMIN — Medication 108 MILLIGRAM(S): at 11:29

## 2018-11-25 RX ADMIN — MEROPENEM 100 MILLIGRAM(S): 1 INJECTION INTRAVENOUS at 06:18

## 2018-11-25 NOTE — PROGRESS NOTE ADULT - ASSESSMENT
Patient with MEDHAT, HAGMA and hyperK brought to hospital due to fall and SOB.  PMH  seizure disorder, DLD, presents to the ED sp fall and SOB    # MEDHAT on ? CKD/ HAGMA/ hyperK   - no baseline cr. available to compare. Last baseline cr. 0.74 (2016).   - MEDHAT likely ATN oliguric due to prolonged prerenal vs ATN    - s. cr improving   - urine out put noted, non-oliguric   - No immediate need for RRT, but may need RRT very soon   - K level noted, elevated, no need for insulin and dextrose for now. monitor K level and EKG closely   - Strict I & O   - lactate level improving   - Avoid nephrotoxins and hypotension    # Coffee ground emesis   - GI on board   - notes appreciated    - Follow HB   - Off AC     # Anemia   - hb noted, no need for BRIAN   - Monitor h/h, Tx if hb < 7    # Infection/ sepsis/ source ?   - WBC count elevated   - cont. ABx   - check pan cultures   - consider ID consult   - Vanco trough noted low, keep current ID eval    # PE/ DVT off AC / vascular on case       will follow

## 2018-11-25 NOTE — DIETITIAN INITIAL EVALUATION ADULT. - ENERGY NEEDS
Energy: 2067 kcal/day ([id number])    Protein: 107-133 g/day (1.2-1.5 g/kg IBW d/t 3+ edema) - taking into account renal function vs. critical illness & pressure wound.    Fluids: Per ICU team

## 2018-11-25 NOTE — PROGRESS NOTE ADULT - SUBJECTIVE AND OBJECTIVE BOX
SUBJECTIVE:    Patient is a 57y old Male who presents with a chief complaint of sob and fall.    Pt extubated this afternoon without difficulty. Pt able to respond to simple commands, currently not verbal. No events overnight.   PAST MEDICAL & SURGICAL HISTORY  HLD (hyperlipidemia)  HTN (hypertension)  Seizures  Unilateral inguinal hernia with obstruction and without gangrene, recurrence not specified  No significant past surgical history    SOCIAL HISTORY:  Negative for smoking/alcohol/drug use.     ALLERGIES:  No Known Allergies    MEDICATIONS:  STANDING MEDICATIONS  atorvastatin 40 milliGRAM(s) Oral at bedtime  chlorhexidine 4% Liquid 1 Application(s) Topical <User Schedule>  docusate sodium 100 milliGRAM(s) Oral two times a day  folic acid 1 milliGRAM(s) Oral daily  heparin  Infusion 14.5 Unit(s)/Hr IV Continuous <Continuous>  levETIRAcetam  IVPB 1500 milliGRAM(s) IV Intermittent every 12 hours  meropenem  IVPB 500 milliGRAM(s) IV Intermittent every 12 hours  meropenem  IVPB      pantoprazole Infusion 8 mG/Hr IV Continuous <Continuous>  phenytoin  IVPB 200 milliGRAM(s) IV Intermittent four times a day  senna 2 Tablet(s) Oral at bedtime  sodium bicarbonate 650 milliGRAM(s) Oral three times a day  thiamine Injectable 100 milliGRAM(s) IV Push daily  vancomycin  IVPB 1250 milliGRAM(s) IV Intermittent daily    PRN MEDICATIONS    VITALS:   T(F): 98.6  HR: 102  BP: 133/82  RR: 16  SpO2: 100%    PHYSICAL EXAM:  Constitutional: Extubated. NAD.   Cardiovascular: Regular rhythm. Regular rate. Normal S1 and S2. No murmurs. 2+ pulses in all extremities.   Pulmonary: Normal respiratory rate and effort. Lungs clear to auscultation bilaterally. No wheezing, rales, or rhonchi. Bilateral, equal lung expansion.   Abdominal: Soft. Nondistended. Nontender. No rebound or guarding.   Extremities. B/l LE edema  Skin: Stage 3 sacral decubitus ulcer  Neuro: Pt responds to simple commands. Pt able to track.       LABS:                        9.9    24.74 )-----------( 213      ( 25 Nov 2018 04:30 )             29.8     11-25    133<L>  |  93<L>  |  57<H>  ----------------------------<  154<H>  5.2<H>   |  25  |  1.6<H>    Ca    8.7      25 Nov 2018 04:30  Phos  5.1     11-25  Mg     2.6     11-25    TPro  6.2  /  Alb  3.4<L>  /  TBili  0.3  /  DBili  x   /  AST  41  /  ALT  69<H>  /  AlkPhos  164<H>  11-25    PT/INR - ( 24 Nov 2018 04:30 )   PT: 13.90 sec;   INR: 1.21 ratio         PTT - ( 25 Nov 2018 16:30 )  PTT:43.1 sec    ABG - ( 25 Nov 2018 14:51 )  pH, Arterial: 7.41  pH, Blood: x     /  pCO2: 43    /  pO2: 95    / HCO3: 27    / Base Excess: 2.2   /  SaO2: 97                    Culture - Blood (collected 24 Nov 2018 06:16)  Source: .Blood None  Preliminary Report (25 Nov 2018 19:01):    No growth to date.    Culture - Blood (collected 23 Nov 2018 02:40)  Source: .Blood Blood  Gram Stain (24 Nov 2018 05:13):    Growth in aerobic bottle: Gram Positive Cocci in Clusters  Preliminary Report (25 Nov 2018 06:56):    Growth in aerobic bottle: Staphylococcus aureus    "Due to technical problems, Proteus sp. will Not be reported as part of    the BCID panel until further notice"    ***Blood Panel PCR results on this specimen are available    approximately 3 hours after the Gram stain result.***    Gram stain, PCR, and/or culture results may not always    correspond due to difference in methodologies.    ************************************************************    This PCR assay was performed using [employer].    The following targets are tested for: Enterococcus,    vancomycin resistant enterococci, Listeria monocytogenes,    coagulase negative staphylococci, S. aureus,    methicillin resistant S. aureus, Streptococcus agalactiae    (Group B), S. pneumoniae, S. pyogenes (Group A),    Acinetobacter baumannii, Enterobacter cloacae, E. coli,    Klebsiella oxytoca, K. pneumoniae, Proteus sp.,    Serratia marcescens, Haemophilus influenzae,    Neisseria meningitidis, Pseudomonas aeruginosa, Candida    albicans, C. glabrata, C krusei, C parapsilosis,    C. tropicalis and the KPC resistance gene.  Organism: Blood Culture PCR (24 Nov 2018 06:34)  Organism: Blood Culture PCR (24 Nov 2018 06:34)    Culture - Blood (collected 23 Nov 2018 02:40)  Source: .Blood Blood  Preliminary Report (24 Nov 2018 13:02):    No growth to date.      CARDIAC MARKERS ( 24 Nov 2018 04:30 )  x     / <0.01 ng/mL / 67 U/L / x     / x              11-24-18 @ 07:01  -  11-25-18 @ 07:00  --------------------------------------------------------  IN: 2507 mL / OUT: 1225 mL / NET: 1282 mL    11-25-18 @ 07:01  -  11-25-18 @ 20:54  --------------------------------------------------------  IN: 2338.4 mL / OUT: 935 mL / NET: 1403.4 mL      Mode: AC/ CMV (Assist Control/ Continuous Mandatory Ventilation), RR (machine): 16, TV (machine): 450, FiO2: 30, PEEP: 5, PS: 5    Radiology:    EXAM:  XR CHEST PORTABLE ROUTINE 1V            PROCEDURE DATE:  11/25/2018      INTERPRETATION:  Clinical History / Reason for exam: Respiratory distress    Comparison : Chest radiograph 11/24/2018.    Technique/Positioning: Single image.    Findings:    Support devices: ET tube mid trachea central venous line superior vena   cava    Cardiac/mediastinum/hilum: Unremarkable.    Lung parenchyma/Pleura: Within normal limits.    Skeleton/soft tissues: Unremarkable.    Impression:      No radiographic evidence of acute cardiopulmonary disease.    MANUEL AC M.D., ATTENDING RADIOLOGIST  This document has been electronically signed. Nov 25 2018  1:59PM

## 2018-11-25 NOTE — DIETITIAN INITIAL EVALUATION ADULT. - SOURCE
Unable to obtain nutrition history at this time. Will attempt to obtain if extubated. Per chart, allergy to seafood.

## 2018-11-25 NOTE — PROGRESS NOTE ADULT - ATTENDING COMMENTS
Patient seen and examined with fellow, labs and meds reviewed.. Patient with MEDHAT on probably CKD, GI bleed and potassium has improved but diet still to be restricted, and monitored closely.

## 2018-11-25 NOTE — DIETITIAN INITIAL EVALUATION ADULT. - MD RECOMMEND
Recommendation: Change EN formula to Osmolite 1.5 at goal rate 45 mL/hr. Order 3 pkts Beneprotein q12hrs. Regimen at goal provides 1770 kcal, 103 g protein, 821 mL free H2O, 2093 mL K+, 1080 mg PO4. Flushes per LIP.

## 2018-11-25 NOTE — PROGRESS NOTE ADULT - ATTENDING COMMENTS
11/24 pt remains intubated ///cxr ok tube in good positionlast blood cult neg  wbc decreased////ck vanco level///continue heparin    11/25 overall status improved//will attempt extubation today//check dilantin level//keppra  spoke at length with staff at bedside

## 2018-11-25 NOTE — PROGRESS NOTE ADULT - SUBJECTIVE AND OBJECTIVE BOX
Nephrology progress note    Patient is seen and examined, events over the last 24 h noted.  Pt intubated on vent    Allergies:  No Known Allergies  Seafood (Unknown)    Hospital Medications:   MEDICATIONS  (STANDING):  atorvastatin 40 milliGRAM(s) Oral at bedtime  chlorhexidine 0.12% Liquid 15 milliLiter(s) Oral Mucosa every 12 hours  chlorhexidine 4% Liquid 1 Application(s) Topical <User Schedule>  docusate sodium 100 milliGRAM(s) Oral two times a day  fentaNYL   Infusion 0.5 MICROgram(s)/kG/Hr (4.25 mL/Hr) IV Continuous <Continuous>  folic acid 1 milliGRAM(s) Oral daily  heparin  Infusion 14.5 Unit(s)/Hr (0.145 mL/Hr) IV Continuous <Continuous>  levETIRAcetam  IVPB 1500 milliGRAM(s) IV Intermittent every 12 hours  meropenem  IVPB 500 milliGRAM(s) IV Intermittent every 12 hours  meropenem  IVPB      pantoprazole Infusion 8 mG/Hr (10 mL/Hr) IV Continuous <Continuous>  phenytoin  IVPB 200 milliGRAM(s) IV Intermittent four times a day  propofol Infusion 5 MICROgram(s)/kG/Min (2.55 mL/Hr) IV Continuous <Continuous>  senna 2 Tablet(s) Oral at bedtime  sodium bicarbonate 650 milliGRAM(s) Oral three times a day  thiamine Injectable 100 milliGRAM(s) IV Push daily  vancomycin  IVPB 1250 milliGRAM(s) IV Intermittent daily        VITALS:  T(F): 98.2 (11-25-18 @ 08:00), Max: 99.9 (11-24-18 @ 16:00)  HR: 104 (11-25-18 @ 13:00)  BP: 124/84 (11-25-18 @ 13:00)  RR: 20 (11-25-18 @ 13:00)  SpO2: 100% (11-25-18 @ 13:00)      11-23 @ 07:01  -  11-24 @ 07:00  --------------------------------------------------------  IN: 3111.5 mL / OUT: 733 mL / NET: 2378.5 mL    11-24 @ 07:01  -  11-25 @ 07:00  --------------------------------------------------------  IN: 2507 mL / OUT: 1225 mL / NET: 1282 mL    11-25 @ 07:01  -  11-25 @ 14:29  --------------------------------------------------------  IN: 405.9 mL / OUT: 600 mL / NET: -194.1 mL          PHYSICAL EXAM:  Constitutional: intubated on vent  Respiratory: CTAB, no wheezes, rales or rhonchi  Cardiovascular: S1, S2, RRR  Extremities: No peripheral edema  :  mckeon in place       LABS:  11-25    133<L>  |  93<L>  |  57<H>  ----------------------------<  154<H>  5.2<H>   |  25  |  1.6<H>    Creatinine Trend: 1.6<--, 2.1<--, 2.2<--, 2.2<--, 2.4<--, 2.3<--    Ca    8.7      25 Nov 2018 04:30  Phos  5.1     11-25  Mg     2.6     11-25    TPro  6.2  /  Alb  3.4<L>  /  TBili  0.3  /  DBili      /  AST  41  /  ALT  69<H>  /  AlkPhos  164<H>  11-25                          9.9    24.74 )-----------( 213      ( 25 Nov 2018 04:30 )             29.8     Blood Gas Arterial, Lactate: 0.9 mmoL/L (11.25.18 @ 12:37)    Urine Studies:  Urinalysis Basic - ( 23 Nov 2018 04:30 )    Color: Dark Yellow / Appearance: Cloudy / SG: >=1.030 / pH:   Gluc:  / Ketone: Trace  / Bili: Negative / Urobili: 0.2   Blood:  / Protein: 100 / Nitrite: Negative   Leuk Esterase: Negative / RBC: 6-10 /HPF / WBC    Sq Epi:  / Non Sq Epi:  / Bacteria: Moderate /HPF        RADIOLOGY & ADDITIONAL STUDIES:  < from: Xray Chest 1 View- PORTABLE-Routine (11.25.18 @ 04:37) >  Impression:      No radiographic evidence of acute cardiopulmonary disease.    < end of copied text >    < from: [facility] Duplex Lower Ext Vein Scan, Bilat (11.23.18 @ 11:27) >  Impression:    Bilateral common femoral, femoral, deep femoral, popliteal,  Posterior   tibial and peroneal veins are thrombosed bilaterally as well as bilateral   gastrocnemius veins.    < end of copied text >

## 2018-11-25 NOTE — PROGRESS NOTE ADULT - ASSESSMENT
57M, PMHx of seizure disorder, DLD, presents to the ED sp fall from NH. Pt was brought in by EMS from his nursing home due to a fall and then experienced SOB.  Pt reported b/l lateral chest pain and difficulty breathing.X Patient was is respiratory distress and hypoxic to 80s on RA and tachypneic, and was intubated for airway protection. In ED, he was found to have b/l pe without strain, melina with HAGMA and lactic acidosis, coffee ground emesis. Admitted to CCU. No further episodes of emesis. Sedated with propofol and fentanyl. Blood culture positive for MRSA, on jordi and vanc. Vanc trough noted.     # Acute hypoxic respiratory failure 2/2 b/l PE with no RV strain   - Extubated without difficulty. Speech consult pending  - hob @ 45 degrees  - on heparin drip  - Echo summary:   1. Septal Motion is dysynchronous.   2. In subcostal views RV appears normal      Not visualized in other views      Pulmonary pressures not able to be evlauate.   3. Mild mitral annular calcification.  - As per GI, heparin drip for PE outweighs risks    # MRSA bacteremia   - blood cx + for gram positive cocci   - WBC count elevated  - c/w jordi and vanc  - consider ID consult    #Sacral decubitus ulcer  - Burn consult for sacral decubitus ulcer    # Extensive DVT   - on heparin drip   - f/u ptt goal 50-60  - vascular on board: c/w heparin drip    # CVA  - Neuro on board: brain MRI. maintain ptt 50-60, keep BP ~140     # Renal failure  - Monitor K and EKG closely  - No urgent RRT needed yet, may need soon  -   - Nephro following: no immediate need for RRT, but may need it soon     --IV lasix 40 once, repeat bmp at 4 pm     #stage 2 sacral ulcer  - does not appear to be infected  - wound care per nursing   - burn eval    #coffee ground emesis  - GI on board: protonix drip, ok to start hep drip, will consider EGD when more stable   - f/u h&h, stable     CODE STATUS: FULL CODE  (family contacted, wants pt full code)   PALLIATIVE CARE EVAL  POOR PROGNOSIS

## 2018-11-25 NOTE — DIETITIAN INITIAL EVALUATION ADULT. - DIET TYPE
Nepro goal rate of 60 mL/hr ordered on 11/24. Goal rate achieved today. Regimen at goal over 24 hour period to provide 2592 kcal, 116 g protein, 1051 mL free H2O, 1500 mg K+, 1020 mg PO4./NPO with tube feedings

## 2018-11-25 NOTE — PROGRESS NOTE ADULT - SUBJECTIVE AND OBJECTIVE BOX
Over Night Events:none        ROS:  See HPI    PHYSICAL EXAM    ICU Vital Signs Last 24 Hrs  T(C): 36.8 (25 Nov 2018 08:00), Max: 37.7 (24 Nov 2018 16:00)  T(F): 98.2 (25 Nov 2018 08:00), Max: 99.9 (24 Nov 2018 16:00)  HR: 104 (25 Nov 2018 13:00) (83 - 122)  BP: 124/84 (25 Nov 2018 13:00) (91/61 - 142/79)  BP(mean): 99 (25 Nov 2018 13:00) (74 - 103)  ABP: --  ABP(mean): --  RR: 20 (25 Nov 2018 13:00) (11 - 28)  SpO2: 100% (25 Nov 2018 13:00) (96% - 100%)        11-24-18 @ 07:01  -  11-25-18 @ 07:00  --------------------------------------------------------  IN: 2507 mL / OUT: 1225 mL / NET: 1282 mL    11-25-18 @ 07:01  -  11-25-18 @ 13:54  --------------------------------------------------------  IN: 360.9 mL / OUT: 500 mL / NET: -139.1 mL        General:  HEENT:                Lymph Nodes: NO cervical LN   Lungs: Bilateral BS  Cardiovascular: Regular   Abdomen: Soft, Positive BS  Extremities: No clubbing   Skin:   Neurological:       LABS:                          9.9    24.74 )-----------( 213      ( 25 Nov 2018 04:30 )             29.8                                               11-25    133<L>  |  93<L>  |  57<H>  ----------------------------<  154<H>  5.2<H>   |  25  |  1.6<H>    Ca    8.7      25 Nov 2018 04:30  Phos  5.1     11-25  Mg     2.6     11-25    TPro  6.2  /  Alb  3.4<L>  /  TBili  0.3  /  DBili  x   /  AST  41  /  ALT  69<H>  /  AlkPhos  164<H>  11-25      PT/INR - ( 24 Nov 2018 04:30 )   PT: 13.90 sec;   INR: 1.21 ratio         PTT - ( 25 Nov 2018 07:00 )  PTT:43.0 sec                                           CARDIAC MARKERS ( 24 Nov 2018 04:30 )  x     / <0.01 ng/mL / 67 U/L / x     / x                                                LIVER FUNCTIONS - ( 25 Nov 2018 04:30 )  Alb: 3.4 g/dL / Pro: 6.2 g/dL / ALK PHOS: 164 U/L / ALT: 69 U/L / AST: 41 U/L / GGT: x                                                  Culture - Blood (collected 23 Nov 2018 02:40)  Source: .Blood Blood  Gram Stain (24 Nov 2018 05:13):    Growth in aerobic bottle: Gram Positive Cocci in Clusters  Preliminary Report (25 Nov 2018 06:56):    Growth in aerobic bottle: Staphylococcus aureus    "Due to technical problems, Proteus sp. will Not be reported as part of    the BCID panel until further notice"    ***Blood Panel PCR results on this specimen are available    approximately 3 hours after the Gram stain result.***    Gram stain, PCR, and/or culture results may not always    correspond due to difference in methodologies.    ************************************************************    This PCR assay was performed using [employer].    The following targets are tested for: Enterococcus,    vancomycin resistant enterococci, Listeria monocytogenes,    coagulase negative staphylococci, S. aureus,    methicillin resistant S. aureus, Streptococcus agalactiae    (Group B), S. pneumoniae, S. pyogenes (Group A),    Acinetobacter baumannii, Enterobacter cloacae, E. coli,    Klebsiella oxytoca, K. pneumoniae, Proteus sp.,    Serratia marcescens, Haemophilus influenzae,    Neisseria meningitidis, Pseudomonas aeruginosa, Candida    albicans, C. glabrata, C krusei, C parapsilosis,    C. tropicalis and the KPC resistance gene.  Organism: Blood Culture PCR (24 Nov 2018 06:34)  Organism: Blood Culture PCR (24 Nov 2018 06:34)    Culture - Blood (collected 23 Nov 2018 02:40)  Source: .Blood Blood  Preliminary Report (24 Nov 2018 13:02):    No growth to date.                                                   Mode: AC/ CMV (Assist Control/ Continuous Mandatory Ventilation)  RR (machine): 16  TV (machine): 450  FiO2: 30  PEEP: 5  PS: 5                                      ABG - ( 25 Nov 2018 12:37 )  pH, Arterial: 7.44  pH, Blood: x     /  pCO2: 38    /  pO2: 133   / HCO3: 26    / Base Excess: 1.7   /  SaO2: 99                  MEDICATIONS  (STANDING):  atorvastatin 40 milliGRAM(s) Oral at bedtime  chlorhexidine 0.12% Liquid 15 milliLiter(s) Oral Mucosa every 12 hours  chlorhexidine 4% Liquid 1 Application(s) Topical <User Schedule>  docusate sodium 100 milliGRAM(s) Oral two times a day  fentaNYL   Infusion 0.5 MICROgram(s)/kG/Hr (4.25 mL/Hr) IV Continuous <Continuous>  folic acid 1 milliGRAM(s) Oral daily  heparin  Infusion 14.5 Unit(s)/Hr (0.145 mL/Hr) IV Continuous <Continuous>  levETIRAcetam  IVPB 1500 milliGRAM(s) IV Intermittent every 12 hours  meropenem  IVPB 500 milliGRAM(s) IV Intermittent every 12 hours  meropenem  IVPB      pantoprazole Infusion 8 mG/Hr (10 mL/Hr) IV Continuous <Continuous>  phenytoin  IVPB 200 milliGRAM(s) IV Intermittent four times a day  propofol Infusion 5 MICROgram(s)/kG/Min (2.55 mL/Hr) IV Continuous <Continuous>  senna 2 Tablet(s) Oral at bedtime  sodium bicarbonate 650 milliGRAM(s) Oral three times a day  thiamine Injectable 100 milliGRAM(s) IV Push daily  vancomycin  IVPB 1250 milliGRAM(s) IV Intermittent daily    MEDICATIONS  (PRN):      Xrays: et ok                                                                                    ECHO

## 2018-11-25 NOTE — DIETITIAN INITIAL EVALUATION ADULT. - OTHER INFO
Reason for RD assessment: Consult for Pressure Ulcer stage II or greater + OG tube feed. Pertinent Medical Information: p/w SOB & fall. Acute hypoxic respiratory failure 2/2 b/l PE with no RV strain. MRSA bacteremia. Extensive DVT noted. Renal failure - no immediate need for RRT, but may need it soon. MEDHAT on ? CKD/ HAGMA/ hyperK. Coffee ground emesis noted - will consider EGD when more stable.

## 2018-11-25 NOTE — DIETITIAN INITIAL EVALUATION ADULT. - PHYSICAL APPEARANCE
BMI: 26.9. Intubated at time of RD visit. 3+ edema (B/L foot, B/L leg). Hypoactive bowel sounds. Abd non-distended per progress notes. Last BM 11/23. OG tube in. Skin: Stage III decubitus to sacrum.

## 2018-11-26 LAB
-  AMPICILLIN/SULBACTAM: SIGNIFICANT CHANGE UP
-  CEFAZOLIN: SIGNIFICANT CHANGE UP
-  CLINDAMYCIN: SIGNIFICANT CHANGE UP
-  DAPTOMYCIN: SIGNIFICANT CHANGE UP
-  ERYTHROMYCIN: SIGNIFICANT CHANGE UP
-  GENTAMICIN: SIGNIFICANT CHANGE UP
-  LINEZOLID: SIGNIFICANT CHANGE UP
-  OXACILLIN: SIGNIFICANT CHANGE UP
-  PENICILLIN: SIGNIFICANT CHANGE UP
-  RIFAMPIN: SIGNIFICANT CHANGE UP
-  TETRACYCLINE: SIGNIFICANT CHANGE UP
-  TRIMETHOPRIM/SULFAMETHOXAZOLE: SIGNIFICANT CHANGE UP
-  VANCOMYCIN: SIGNIFICANT CHANGE UP
ALBUMIN SERPL ELPH-MCNC: 3.4 G/DL — LOW (ref 3.5–5.2)
ALP SERPL-CCNC: 157 U/L — HIGH (ref 30–115)
ALT FLD-CCNC: 52 U/L — HIGH (ref 0–41)
ANION GAP SERPL CALC-SCNC: 16 MMOL/L — HIGH (ref 7–14)
APPEARANCE UR: CLEAR — SIGNIFICANT CHANGE UP
APTT BLD: 41.2 SEC — HIGH (ref 27–39.2)
APTT BLD: 51 SEC — HIGH (ref 27–39.2)
APTT BLD: 53.3 SEC — HIGH (ref 27–39.2)
AST SERPL-CCNC: 27 U/L — SIGNIFICANT CHANGE UP (ref 0–41)
BACTERIA # UR AUTO: ABNORMAL /HPF
BASOPHILS # BLD AUTO: 0.06 K/UL — SIGNIFICANT CHANGE UP (ref 0–0.2)
BASOPHILS NFR BLD AUTO: 0.3 % — SIGNIFICANT CHANGE UP (ref 0–1)
BILIRUB SERPL-MCNC: 0.5 MG/DL — SIGNIFICANT CHANGE UP (ref 0.2–1.2)
BILIRUB UR-MCNC: NEGATIVE — SIGNIFICANT CHANGE UP
BLD GP AB SCN SERPL QL: SIGNIFICANT CHANGE UP
BUN SERPL-MCNC: 46 MG/DL — HIGH (ref 10–20)
CALCIUM SERPL-MCNC: 8.8 MG/DL — SIGNIFICANT CHANGE UP (ref 8.5–10.1)
CHLORIDE SERPL-SCNC: 94 MMOL/L — LOW (ref 98–110)
CO2 SERPL-SCNC: 25 MMOL/L — SIGNIFICANT CHANGE UP (ref 17–32)
COLOR SPEC: YELLOW — SIGNIFICANT CHANGE UP
CREAT SERPL-MCNC: 0.9 MG/DL — SIGNIFICANT CHANGE UP (ref 0.7–1.5)
CULTURE RESULTS: SIGNIFICANT CHANGE UP
DIFF PNL FLD: ABNORMAL
EOSINOPHIL # BLD AUTO: 0.04 K/UL — SIGNIFICANT CHANGE UP (ref 0–0.7)
EOSINOPHIL NFR BLD AUTO: 0.2 % — SIGNIFICANT CHANGE UP (ref 0–8)
GLUCOSE SERPL-MCNC: 130 MG/DL — HIGH (ref 70–99)
GLUCOSE UR QL: NEGATIVE — SIGNIFICANT CHANGE UP
HCT VFR BLD CALC: 30 % — LOW (ref 42–52)
HGB BLD-MCNC: 9.9 G/DL — LOW (ref 14–18)
IMM GRANULOCYTES NFR BLD AUTO: 4.2 % — HIGH (ref 0.1–0.3)
KETONES UR-MCNC: NEGATIVE — SIGNIFICANT CHANGE UP
LEUKOCYTE ESTERASE UR-ACNC: NEGATIVE — SIGNIFICANT CHANGE UP
LYMPHOCYTES # BLD AUTO: 11.7 % — LOW (ref 20.5–51.1)
LYMPHOCYTES # BLD AUTO: 2.57 K/UL — SIGNIFICANT CHANGE UP (ref 1.2–3.4)
MAGNESIUM SERPL-MCNC: 2.8 MG/DL — HIGH (ref 1.8–2.4)
MCHC RBC-ENTMCNC: 29.4 PG — SIGNIFICANT CHANGE UP (ref 27–31)
MCHC RBC-ENTMCNC: 33 G/DL — SIGNIFICANT CHANGE UP (ref 32–37)
MCV RBC AUTO: 89 FL — SIGNIFICANT CHANGE UP (ref 80–94)
METHOD TYPE: SIGNIFICANT CHANGE UP
MONOCYTES # BLD AUTO: 2.36 K/UL — HIGH (ref 0.1–0.6)
MONOCYTES NFR BLD AUTO: 10.7 % — HIGH (ref 1.7–9.3)
NEUTROPHILS # BLD AUTO: 16.11 K/UL — HIGH (ref 1.4–6.5)
NEUTROPHILS NFR BLD AUTO: 72.9 % — SIGNIFICANT CHANGE UP (ref 42.2–75.2)
NITRITE UR-MCNC: NEGATIVE — SIGNIFICANT CHANGE UP
NRBC # BLD: 0 /100 WBCS — SIGNIFICANT CHANGE UP (ref 0–0)
ORGANISM # SPEC MICROSCOPIC CNT: SIGNIFICANT CHANGE UP
PH UR: 6 — SIGNIFICANT CHANGE UP (ref 5–8)
PHOSPHATE SERPL-MCNC: 2.7 MG/DL — SIGNIFICANT CHANGE UP (ref 2.1–4.9)
PLATELET # BLD AUTO: 229 K/UL — SIGNIFICANT CHANGE UP (ref 130–400)
POTASSIUM SERPL-MCNC: 5.1 MMOL/L — HIGH (ref 3.5–5)
POTASSIUM SERPL-SCNC: 5.1 MMOL/L — HIGH (ref 3.5–5)
PROT SERPL-MCNC: 6.6 G/DL — SIGNIFICANT CHANGE UP (ref 6–8)
PROT UR-MCNC: 30
RBC # BLD: 3.37 M/UL — LOW (ref 4.7–6.1)
RBC # FLD: 14.1 % — SIGNIFICANT CHANGE UP (ref 11.5–14.5)
RBC CASTS # UR COMP ASSIST: ABNORMAL /HPF
SODIUM SERPL-SCNC: 135 MMOL/L — SIGNIFICANT CHANGE UP (ref 135–146)
SP GR SPEC: >=1.03 — SIGNIFICANT CHANGE UP (ref 1.01–1.03)
SPECIMEN SOURCE: SIGNIFICANT CHANGE UP
TYPE + AB SCN PNL BLD: SIGNIFICANT CHANGE UP
URATE CRY FLD QL MICRO: ABNORMAL /HPF
UROBILINOGEN FLD QL: 0.2 — SIGNIFICANT CHANGE UP (ref 0.2–0.2)
WBC # BLD: 22.06 K/UL — HIGH (ref 4.8–10.8)
WBC # FLD AUTO: 22.06 K/UL — HIGH (ref 4.8–10.8)

## 2018-11-26 RX ORDER — HEPARIN SODIUM 5000 [USP'U]/ML
1700 INJECTION INTRAVENOUS; SUBCUTANEOUS
Qty: 25000 | Refills: 0 | Status: DISCONTINUED | OUTPATIENT
Start: 2018-11-26 | End: 2018-11-27

## 2018-11-26 RX ORDER — THIAMINE MONONITRATE (VIT B1) 100 MG
100 TABLET ORAL DAILY
Qty: 0 | Refills: 0 | Status: DISCONTINUED | OUTPATIENT
Start: 2018-11-26 | End: 2018-12-13

## 2018-11-26 RX ORDER — PANTOPRAZOLE SODIUM 20 MG/1
40 TABLET, DELAYED RELEASE ORAL
Qty: 0 | Refills: 0 | Status: DISCONTINUED | OUTPATIENT
Start: 2018-11-26 | End: 2018-12-13

## 2018-11-26 RX ORDER — HEPARIN SODIUM 5000 [USP'U]/ML
1500 INJECTION INTRAVENOUS; SUBCUTANEOUS
Qty: 25000 | Refills: 0 | Status: DISCONTINUED | OUTPATIENT
Start: 2018-11-26 | End: 2018-11-26

## 2018-11-26 RX ORDER — LEVETIRACETAM 250 MG/1
1500 TABLET, FILM COATED ORAL
Qty: 0 | Refills: 0 | Status: DISCONTINUED | OUTPATIENT
Start: 2018-11-26 | End: 2018-12-13

## 2018-11-26 RX ADMIN — LEVETIRACETAM 400 MILLIGRAM(S): 250 TABLET, FILM COATED ORAL at 17:25

## 2018-11-26 RX ADMIN — Medication 108 MILLIGRAM(S): at 11:41

## 2018-11-26 RX ADMIN — Medication 108 MILLIGRAM(S): at 17:40

## 2018-11-26 RX ADMIN — MEROPENEM 100 MILLIGRAM(S): 1 INJECTION INTRAVENOUS at 06:24

## 2018-11-26 RX ADMIN — Medication 100 MILLIGRAM(S): at 11:49

## 2018-11-26 RX ADMIN — Medication 650 MILLIGRAM(S): at 21:45

## 2018-11-26 RX ADMIN — Medication 100 MILLIGRAM(S): at 17:24

## 2018-11-26 RX ADMIN — CHLORHEXIDINE GLUCONATE 1 APPLICATION(S): 213 SOLUTION TOPICAL at 06:23

## 2018-11-26 RX ADMIN — SENNA PLUS 2 TABLET(S): 8.6 TABLET ORAL at 21:45

## 2018-11-26 RX ADMIN — HEPARIN SODIUM 18 UNIT(S)/HR: 5000 INJECTION INTRAVENOUS; SUBCUTANEOUS at 21:44

## 2018-11-26 RX ADMIN — PANTOPRAZOLE SODIUM 40 MILLIGRAM(S): 20 TABLET, DELAYED RELEASE ORAL at 17:24

## 2018-11-26 RX ADMIN — Medication 108 MILLIGRAM(S): at 00:00

## 2018-11-26 RX ADMIN — MEROPENEM 100 MILLIGRAM(S): 1 INJECTION INTRAVENOUS at 17:25

## 2018-11-26 RX ADMIN — LEVETIRACETAM 400 MILLIGRAM(S): 250 TABLET, FILM COATED ORAL at 06:24

## 2018-11-26 RX ADMIN — HEPARIN SODIUM 17 UNIT(S)/HR: 5000 INJECTION INTRAVENOUS; SUBCUTANEOUS at 03:50

## 2018-11-26 RX ADMIN — Medication 108 MILLIGRAM(S): at 06:24

## 2018-11-26 RX ADMIN — Medication 166.67 MILLIGRAM(S): at 06:25

## 2018-11-26 RX ADMIN — Medication 650 MILLIGRAM(S): at 14:29

## 2018-11-26 RX ADMIN — Medication 200 MILLIGRAM(S): at 23:19

## 2018-11-26 RX ADMIN — Medication 1 MILLIGRAM(S): at 11:41

## 2018-11-26 RX ADMIN — ATORVASTATIN CALCIUM 40 MILLIGRAM(S): 80 TABLET, FILM COATED ORAL at 21:45

## 2018-11-26 NOTE — PROGRESS NOTE ADULT - SUBJECTIVE AND OBJECTIVE BOX
SUBJECTIVE:    Patient is a 57y old Male who presents with a chief complaint of sob and fall (26 Nov 2018 09:34)    Currently admitted to medicine with the primary diagnosis of Acute respiratory failure with hypoxia     Today is hospital day 3d.   OVERNIGHT EVENTS  Today, patient is    PAST MEDICAL & SURGICAL HISTORY  HLD (hyperlipidemia)  HTN (hypertension)  Seizures  Unilateral inguinal hernia with obstruction and without gangrene, recurrence not specified  No significant past surgical history          ALLERGIES:  No Known Allergies    MEDICATIONS:  STANDING MEDICATIONS  atorvastatin 40 milliGRAM(s) Oral at bedtime  chlorhexidine 4% Liquid 1 Application(s) Topical <User Schedule>  docusate sodium 100 milliGRAM(s) Oral two times a day  folic acid 1 milliGRAM(s) Oral daily  heparin  Infusion 14.5 Unit(s)/Hr IV Continuous <Continuous>  levETIRAcetam  IVPB 1500 milliGRAM(s) IV Intermittent every 12 hours  meropenem  IVPB 500 milliGRAM(s) IV Intermittent every 12 hours  meropenem  IVPB      pantoprazole Infusion 8 mG/Hr IV Continuous <Continuous>  phenytoin  IVPB 200 milliGRAM(s) IV Intermittent four times a day  senna 2 Tablet(s) Oral at bedtime  sodium bicarbonate 650 milliGRAM(s) Oral three times a day  thiamine Injectable 100 milliGRAM(s) IV Push daily  vancomycin  IVPB 1250 milliGRAM(s) IV Intermittent daily    PRN MEDICATIONS    VITALS:   T(F): 98.2  HR: 106  BP: 135/82  RR: 18  SpO2: 98%          LABS:                        9.9    22.06 )-----------( 229      ( 26 Nov 2018 04:20 )             30.0     11-26    135  |  94<L>  |  46<H>  ----------------------------<  130<H>  5.1<H>   |  25  |  0.9    Ca    8.8      26 Nov 2018 04:20  Phos  2.7     11-26  Mg     2.8     11-26    TPro  6.6  /  Alb  3.4<L>  /  TBili  0.5  /  DBili  x   /  AST  27  /  ALT  52<H>  /  AlkPhos  157<H>  11-26    PTT - ( 26 Nov 2018 09:49 )  PTT:51.0 sec    ABG - ( 25 Nov 2018 14:51 )  pH, Arterial: 7.41  pH, Blood: x     /  pCO2: 43    /  pO2: 95    / HCO3: 27    / Base Excess: 2.2   /  SaO2: 97                    Culture - Blood (collected 24 Nov 2018 06:16)  Source: .Blood None  Preliminary Report (25 Nov 2018 19:01):    No growth to date.          RADIOLOGY:    PHYSICAL EXAM:  GEN:   LUNGS:   HEART:   ABD:   EXT:   NEURO: SUBJECTIVE:    Patient is a 57y old Male who presents with a chief complaint of sob and fall (26 Nov 2018 09:34)    Currently admitted to medicine with the primary diagnosis of Acute respiratory failure with hypoxia     Today is hospital day 3d.   OVERNIGHT EVENTS no acute events, extubated 11/25/18, sating well on NC    PAST MEDICAL & SURGICAL HISTORY  HLD (hyperlipidemia)  HTN (hypertension)  Seizures  Unilateral inguinal hernia with obstruction and without gangrene, recurrence not specified  No significant past surgical history          ALLERGIES:  No Known Allergies    MEDICATIONS:  STANDING MEDICATIONS  atorvastatin 40 milliGRAM(s) Oral at bedtime  chlorhexidine 4% Liquid 1 Application(s) Topical <User Schedule>  docusate sodium 100 milliGRAM(s) Oral two times a day  folic acid 1 milliGRAM(s) Oral daily  heparin  Infusion 14.5 Unit(s)/Hr IV Continuous <Continuous>  levETIRAcetam  IVPB 1500 milliGRAM(s) IV Intermittent every 12 hours  meropenem  IVPB 500 milliGRAM(s) IV Intermittent every 12 hours  meropenem  IVPB      pantoprazole Infusion 8 mG/Hr IV Continuous <Continuous>  phenytoin  IVPB 200 milliGRAM(s) IV Intermittent four times a day  senna 2 Tablet(s) Oral at bedtime  sodium bicarbonate 650 milliGRAM(s) Oral three times a day  thiamine Injectable 100 milliGRAM(s) IV Push daily  vancomycin  IVPB 1250 milliGRAM(s) IV Intermittent daily    PRN MEDICATIONS    VITALS:   T(F): 98.2  HR: 106  BP: 135/82  RR: 18  SpO2: 98%          LABS:                        9.9    22.06 )-----------( 229      ( 26 Nov 2018 04:20 )             30.0     11-26    135  |  94<L>  |  46<H>  ----------------------------<  130<H>  5.1<H>   |  25  |  0.9    Ca    8.8      26 Nov 2018 04:20  Phos  2.7     11-26  Mg     2.8     11-26    TPro  6.6  /  Alb  3.4<L>  /  TBili  0.5  /  DBili  x   /  AST  27  /  ALT  52<H>  /  AlkPhos  157<H>  11-26    PTT - ( 26 Nov 2018 09:49 )  PTT:51.0 sec    ABG - ( 25 Nov 2018 14:51 )  pH, Arterial: 7.41  pH, Blood: x     /  pCO2: 43    /  pO2: 95    / HCO3: 27    / Base Excess: 2.2   /  SaO2: 97                    Culture - Blood (collected 24 Nov 2018 06:16)  Source: .Blood None  Preliminary Report (25 Nov 2018 19:01):    No growth to date.          RADIOLOGY:    PHYSICAL EXAM:  GEN: NAD  LUNGS: CTAB  HEART: tachycardic s1s2 present  ABD: soft nontender nondistended  EXT: +edema, color change SUBJECTIVE:    Patient is a 57y old Male who presents with a chief complaint of sob and fall (26 Nov 2018 09:34)    Currently admitted to medicine with the primary diagnosis of Acute respiratory failure with hypoxia     Today is hospital day 3d.   OVERNIGHT EVENTS no acute events, extubated 11/25/18, sating well on NC    PAST MEDICAL & SURGICAL HISTORY  HLD (hyperlipidemia)  HTN (hypertension)  Seizures  Unilateral inguinal hernia with obstruction and without gangrene, recurrence not specified  No significant past surgical history          ALLERGIES:  No Known Allergies    MEDICATIONS:  STANDING MEDICATIONS  atorvastatin 40 milliGRAM(s) Oral at bedtime  chlorhexidine 4% Liquid 1 Application(s) Topical <User Schedule>  docusate sodium 100 milliGRAM(s) Oral two times a day  folic acid 1 milliGRAM(s) Oral daily  heparin  Infusion 14.5 Unit(s)/Hr IV Continuous <Continuous>  levETIRAcetam  IVPB 1500 milliGRAM(s) IV Intermittent every 12 hours  meropenem  IVPB 500 milliGRAM(s) IV Intermittent every 12 hours  meropenem  IVPB      pantoprazole Infusion 8 mG/Hr IV Continuous <Continuous>  phenytoin  IVPB 200 milliGRAM(s) IV Intermittent four times a day  senna 2 Tablet(s) Oral at bedtime  sodium bicarbonate 650 milliGRAM(s) Oral three times a day  thiamine Injectable 100 milliGRAM(s) IV Push daily  vancomycin  IVPB 1250 milliGRAM(s) IV Intermittent daily    PRN MEDICATIONS    VITALS:   T(F): 98.2  HR: 106  BP: 135/82  RR: 18  SpO2: 98%          LABS:                        9.9    22.06 )-----------( 229      ( 26 Nov 2018 04:20 )             30.0     11-26    135  |  94<L>  |  46<H>  ----------------------------<  130<H>  5.1<H>   |  25  |  0.9    Ca    8.8      26 Nov 2018 04:20  Phos  2.7     11-26  Mg     2.8     11-26    TPro  6.6  /  Alb  3.4<L>  /  TBili  0.5  /  DBili  x   /  AST  27  /  ALT  52<H>  /  AlkPhos  157<H>  11-26    PTT - ( 26 Nov 2018 09:49 )  PTT:51.0 sec    ABG - ( 25 Nov 2018 14:51 )  pH, Arterial: 7.41  pH, Blood: x     /  pCO2: 43    /  pO2: 95    / HCO3: 27    / Base Excess: 2.2   /  SaO2: 97                    Culture - Blood (collected 24 Nov 2018 06:16)  Source: .Blood None  Preliminary Report (25 Nov 2018 19:01):    No growth to date.          RADIOLOGY:    PHYSICAL EXAM:  GEN: NAD  LUNGS: CTAB  HEART: tachycardic s1s2 present  ABD: soft nontender nondistended  EXT: +edema, color change  NEURO: [id number]

## 2018-11-26 NOTE — SWALLOW BEDSIDE ASSESSMENT ADULT - ASR SWALLOW ASPIRATION MONITOR
throat clearing/change of breathing pattern/cough/gurgly voice/upper respiratory infection/pneumonia/oral hygiene/position upright (90Y)/fever

## 2018-11-26 NOTE — PROVIDER CONTACT NOTE (MEDICATION) - ACTION/TREATMENT ORDERED:
Per MD Bo heparin drip should be discontinued at 6am. Per MD Harry heparin drip should be discontinued at 6am.

## 2018-11-26 NOTE — PROGRESS NOTE ADULT - ASSESSMENT
IMPRESSION: ACUTE HYPOXIC RESPIRATORY FAILURE/ PE/ NO RV STRAIN ON CT/ EXTENSIVE DVT/ CVA/ GFF/ RENAL FAILURE, STAGE SACRAL ULCER/ ? GI BLEED/ SUBSTANCE ABUSE      PLAN:    CNS: no depressants     HEENT:  Oral care    PULMONARY:  HOB @ 45 degrees, wean off oxygen     CARDIOVASCULAR: CE, ECHO, CARDIO EVAL IVF, heparin, SAVANNAH,   GI: GI prophylaxis                                          Feeding PROTONIX DRIP    RENAL:  F/u  lytes.  Correct as needed. accurate I/O, RENAL EVAL    INFECTIOUS DISEASE: ABX/ BURN EVAL    HEMATOLOGICAL:  DVT prophylaxis. VASCUALR EVAL    ENDOCRINE:  Follow up FS.  Insulin protocol if needed.      CODE STATUS: FULL CODE    DISPOSITION: Pt requires continued monitoring in the MICU  WILL CONTACT FAMILY/ PCP FOR MORE INFORMATION  PALLIATIVE CARE EVAL  POOR PROGNOSIS IMPRESSION: ACUTE HYPOXIC RESPIRATORY FAILURE/ PE/ NO RV STRAIN ON CT/ EXTENSIVE DVT/ CVA/ GFF/ RENAL FAILURE, STAGE SACRAL ULCER/ ? GI BLEED/ SUBSTANCE ABUSE/ MRSA BACTEREMIA/ REPEAT CX NEG      PLAN:    CNS: no depressants     HEENT:  Oral care    PULMONARY:  HOB @ 45 degrees, wean off oxygen     CARDIOVASCULAR: CE, ECHO, CARDIO EVAL IVF, IV HEPARIN, SAVANNAH,   GI: GI prophylaxis                                          Feeding PROTONIX Q 12H    RENAL:  F/u  lytes.  Correct as needed. accurate I/O, RENAL EVAL    INFECTIOUS DISEASE: ABX/ BURN EVAL    HEMATOLOGICAL:  DVT prophylaxis. VASCULAR F/UP    ENDOCRINE:  Follow up FS.  Insulin protocol if needed.      CODE STATUS: FULL CODE    DISPOSITION: Pt requires continued monitoring in the MICU    PALLIATIVE CARE EVAL  POOR PROGNOSIS

## 2018-11-26 NOTE — SWALLOW BEDSIDE ASSESSMENT ADULT - ASR SWALLOW LINGUAL MOBILITY
impaired protrusion/impaired left lateral movement/impaired anterior elevation/impaired right lateral movement

## 2018-11-26 NOTE — PROVIDER CONTACT NOTE (MEDICATION) - ASSESSMENT
MD Bo contacted for order clarification regarding discontinuing heparin drip in AM for SAVANNAH. MD Harry contacted for order clarification regarding discontinuing heparin drip in AM for SAVANNAH.

## 2018-11-26 NOTE — SWALLOW BEDSIDE ASSESSMENT ADULT - PHARYNGEAL PHASE
Wet vocal quality post oral intake/Delayed cough post oral intake/Throat clear post oral intake/Multiple swallows Within functional limits

## 2018-11-26 NOTE — PROGRESS NOTE ADULT - ASSESSMENT
57M, PMHx of seizure disorder, DLD, presents to the ED sp fall from NH. Pt was brought in by EMS from his nursing home due to a fall and then experienced SOB.  Pt reported b/l lateral chest pain and difficulty breathing.X Patient was is respiratory distress and hypoxic to 80s on RA and tachypneic, and was intubated for airway protection. In ED, he was found to have b/l pe without strain, melina with HAGMA and lactic acidosis, coffee ground emesis. Admitted to CCU. No further episodes of emesis. Sedated with propofol and fentanyl. Blood culture positive for MRSA, on jordi and vanc. Vanc trough noted.     # Acute hypoxic respiratory failure 2/2 b/l PE with no RV strain   - Extubated without difficulty.   - speech swallow: dysphagia 2 with nectar thick liquid, 1:1 feed  - hob @ 45 degrees  - on heparin drip  - Echo summary:   1. Septal Motion is dysynchronous.   2. In subcostal views RV appears normal      Not visualized in other views      Pulmonary pressures not able to be evlauate.   3. Mild mitral annular calcification.  - As per GI, heparin drip for PE outweighs risks  - hypercoag workup    # MRSA bacteremia   # r/o bacterial endocarditis  - consult cardio for SAVANNAH   - blood cx + for gram positive cocci   - repeat blood cx negative   - Urine culture sent, f/u  - WBC count elevated  - c/w jordi and vanc  - ID consult  - will d/c multilumen    # Extensive DVT   - on heparin drip   - f/u ptt goal 50-60  - vascular on board: c/w heparin drip    # CVA  - Neuro on board: brain MRI. maintain ptt 50-60, keep BP ~140     # Renal failure  - Monitor K and EKG closely  - No urgent RRT needed yet, may need soon  - Nephro following: no immediate need for RRT, but may need it soon    #stage 2 sacral ulcer  - does not appear to be infected  - wound care per nursing   - burn eval: no intervention, c/w wound care     #coffee ground emesis  - GI f/u: d/c protonix drip?, ok to start hep drip, will consider EGD when more stable   - f/u h&h, stable     CODE STATUS: FULL CODE  (family contacted, wants pt full code)   PALLIATIVE CARE EVAL  POOR PROGNOSIS 57M, PMHx of seizure disorder, DLD, presents to the ED sp fall from NH. Pt was brought in by EMS from his nursing home due to a fall and then experienced SOB.  Pt reported b/l lateral chest pain and difficulty breathing.X Patient was is respiratory distress and hypoxic to 80s on RA and tachypneic, and was intubated for airway protection. In ED, he was found to have b/l pe without strain, melina with HAGMA and lactic acidosis, coffee ground emesis. Admitted to CCU. No further episodes of emesis. Sedated with propofol and fentanyl. Blood culture positive for MRSA, on jordi and vanc. Vanc trough noted.     # Acute hypoxic respiratory failure 2/2 b/l PE with no RV strain   - Extubated without difficulty.   - speech swallow: dysphagia 2 with nectar thick liquid, 1:1 feed  - hob @ 45 degrees  - on heparin drip  - Echo summary:   1. Septal Motion is dysynchronous.   2. In subcostal views RV appears normal      Not visualized in other views      Pulmonary pressures not able to be evlauate.   3. Mild mitral annular calcification.  - As per GI, heparin drip for PE outweighs risks    # MRSA bacteremia   # r/o bacterial endocarditis  - consult cardio for SAVANNAH   - blood cx + for gram positive cocci   - repeat blood cx negative   - Urine culture sent, f/u  - WBC count elevated  - c/w jordi and vanc  - ID consult  - will d/c multilumen    # Extensive DVT   - on heparin drip   - f/u ptt goal 50-60  - vascular on board: c/w heparin drip    # CVA  - Neuro on board: brain MRI. maintain ptt 50-60, keep BP ~140     # Renal failure  - Monitor K and EKG closely  - No urgent RRT needed yet, may need soon  - Nephro following: no immediate need for RRT, but may need it soon    #stage 2 sacral ulcer  - does not appear to be infected  - wound care per nursing   - burn eval: no intervention, c/w wound care     #coffee ground emesis  - GI f/u: d/c protonix drip?, ok to start hep drip, will consider EGD when more stable   - f/u h&h, stable     CODE STATUS: FULL CODE  (family contacted, wants pt full code)   PALLIATIVE CARE EVAL  POOR PROGNOSIS 57M, PMHx of seizure disorder, DLD, presents to the ED sp fall from NH. Pt was brought in by EMS from his nursing home due to a fall and then experienced SOB.  Pt reported b/l lateral chest pain and difficulty breathing.X Patient was is respiratory distress and hypoxic to 80s on RA and tachypneic, and was intubated for airway protection. In ED, he was found to have b/l pe without strain, melina with HAGMA and lactic acidosis, coffee ground emesis. Admitted to CCU. No further episodes of emesis. Sedated with propofol and fentanyl. Blood culture positive for MRSA, on jordi and vanc. Vanc trough noted.     # Acute hypoxic respiratory failure 2/2 b/l PE with no RV strain   - Extubated without difficulty.   - speech swallow: dysphagia 2 with nectar thick liquid, 1:1 feed  - hob @ 45 degrees  - on heparin drip  - Echo summary:   1. Septal Motion is dysynchronous.   2. In subcostal views RV appears normal      Not visualized in other views      Pulmonary pressures not able to be evlauate.   3. Mild mitral annular calcification.  - As per GI, heparin drip for PE outweighs risks    # MRSA bacteremia   # r/o bacterial endocarditis  - consult cardio for SAVANNAH   - blood cx + for gram positive cocci   - repeat blood cx negative   - Urine culture sent, f/u  - WBC count elevated  - c/w jordi and vanc  - ID consult  - will d/c multilumen    # Extensive DVT   - on heparin drip   - f/u ptt goal 50-60  - vascular on board: c/w heparin drip    # CVA  - Neuro on board: brain MRI. maintain ptt 50-60, keep BP ~140     #Thiamine deficiency  -c/w thiamine IV 100mg daily     # Renal failure  - Monitor K and EKG closely  - No urgent RRT needed yet, may need soon  - Nephro following: no immediate need for RRT, but may need it soon    #stage 2 sacral ulcer  - does not appear to be infected  - wound care per nursing   - burn eval: no intervention, c/w wound care     #coffee ground emesis  - GI f/u: d/c protonix drip?, ok to start hep drip, will consider EGD when more stable   - f/u h&h, stable     CODE STATUS: FULL CODE  (family contacted, wants pt full code)   PALLIATIVE CARE EVAL  POOR PROGNOSIS

## 2018-11-26 NOTE — PROGRESS NOTE ADULT - SUBJECTIVE AND OBJECTIVE BOX
Over Night Events:  s/p extubation yesterday 13:20       ROS:  See HPI    PHYSICAL EXAM    ICU Vital Signs Last 24 Hrs  T(C): 36.8 (26 Nov 2018 08:00), Max: 37.1 (26 Nov 2018 00:00)  T(F): 98.2 (26 Nov 2018 08:00), Max: 98.7 (26 Nov 2018 00:00)  HR: 102 (26 Nov 2018 09:00) (98 - 108)  BP: 126/81 (26 Nov 2018 09:00) (109/77 - 139/78)  BP(mean): 99 (26 Nov 2018 09:00) (92 - 104)  RR: 14 (26 Nov 2018 09:00) (11 - 22)  SpO2: 100% (26 Nov 2018 09:00) (98% - 100%)        11-25-18 @ 07:01  -  11-26-18 @ 07:00  --------------------------------------------------------  IN:    Enteral Tube Flush: 60 mL    heparin Infusion: 281.5 mL    heparin Infusion: 81 mL    IV PiggyBack: 850 mL    pantoprazole Infusion: 240 mL    propofol Infusion: 5.9 mL  Total IN: 1518.4 mL    OUT:    Indwelling Catheter - Urethral: 1345 mL  Total OUT: 1345 mL    Total NET: 173.4 mL      11-26-18 @ 07:01  -  11-26-18 @ 09:34  --------------------------------------------------------  IN:    heparin Infusion: 51 mL    pantoprazole Infusion: 30 mL  Total IN: 81 mL    OUT:    Indwelling Catheter - Urethral: 180 mL  Total OUT: 180 mL    Total NET: -99 mL          General:  HEENT:  PERRLA         Lymph Nodes: NO cervical LN, supple, no JVD   Lungs: Bilateral BS, no wheeze or rhonci  Cardiovascular: Regular   Abdomen: Soft, Positive BS, non tender   Extremities: No clubbing, no edema   Skin: INTACT, warm, no edema   Neurological: MOVES ALL EXT, alert oriented       LABS:                          9.9    22.06 )-----------( 229      ( 26 Nov 2018 04:20 )             30.0                                               11-26    135  |  94<L>  |  46<H>  ----------------------------<  130<H>  5.1<H>   |  25  |  0.9    Ca    8.8      26 Nov 2018 04:20  Phos  2.7     11-26  Mg     2.8     11-26    TPro  6.6  /  Alb  3.4<L>  /  TBili  0.5  /  DBili  x   /  AST  27  /  ALT  52<H>  /  AlkPhos  157<H>  11-26      PTT - ( 26 Nov 2018 00:28 )  PTT:53.3 sec                                                                                     LIVER FUNCTIONS - ( 26 Nov 2018 04:20 )  Alb: 3.4 g/dL / Pro: 6.6 g/dL / ALK PHOS: 157 U/L / ALT: 52 U/L / AST: 27 U/L / GGT: x                                                  Culture - Blood (collected 24 Nov 2018 06:16)  Source: .Blood None  Preliminary Report (25 Nov 2018 19:01):    No growth to date.                                                   Mode: AC/ CMV (Assist Control/ Continuous Mandatory Ventilation)  RR (machine): 16  TV (machine): 450  FiO2: 30  PEEP: 5  PS: 5                                      ABG - ( 25 Nov 2018 14:51 )  pH, Arterial: 7.41  pH, Blood: x     /  pCO2: 43    /  pO2: 95    / HCO3: 27    / Base Excess: 2.2   /  SaO2: 97              Blood Gas Arterial, Lactate: 0.9 mmoL/L (11-25-18 @ 14:51)  Blood Gas Arterial, Lactate: 0.9 mmoL/L (11-25-18 @ 12:37)        MEDICATIONS  (STANDING):  atorvastatin 40 milliGRAM(s) Oral at bedtime  chlorhexidine 4% Liquid 1 Application(s) Topical <User Schedule>  docusate sodium 100 milliGRAM(s) Oral two times a day  folic acid 1 milliGRAM(s) Oral daily  heparin  Infusion 14.5 Unit(s)/Hr (17 mL/Hr) IV Continuous <Continuous>  levETIRAcetam  IVPB 1500 milliGRAM(s) IV Intermittent every 12 hours  meropenem  IVPB 500 milliGRAM(s) IV Intermittent every 12 hours  meropenem  IVPB      pantoprazole Infusion 8 mG/Hr (10 mL/Hr) IV Continuous <Continuous>  phenytoin  IVPB 200 milliGRAM(s) IV Intermittent four times a day  senna 2 Tablet(s) Oral at bedtime  sodium bicarbonate 650 milliGRAM(s) Oral three times a day  thiamine Injectable 100 milliGRAM(s) IV Push daily  vancomycin  IVPB 1250 milliGRAM(s) IV Intermittent daily    MEDICATIONS  (PRN):      Xrays:         negative                                                                             ECHO Over Night Events:    s/p extubation yesterday 13:20 , looks comfortable      ROS:  See HPI    PHYSICAL EXAM    ICU Vital Signs Last 24 Hrs  T(C): 36.8 (26 Nov 2018 08:00), Max: 37.1 (26 Nov 2018 00:00)  T(F): 98.2 (26 Nov 2018 08:00), Max: 98.7 (26 Nov 2018 00:00)  HR: 102 (26 Nov 2018 09:00) (98 - 108)  BP: 126/81 (26 Nov 2018 09:00) (109/77 - 139/78)  BP(mean): 99 (26 Nov 2018 09:00) (92 - 104)  RR: 14 (26 Nov 2018 09:00) (11 - 22)  SpO2: 100% (26 Nov 2018 09:00) (98% - 100%)        11-25-18 @ 07:01  -  11-26-18 @ 07:00  --------------------------------------------------------  IN:    Enteral Tube Flush: 60 mL    heparin Infusion: 281.5 mL    heparin Infusion: 81 mL    IV PiggyBack: 850 mL    pantoprazole Infusion: 240 mL    propofol Infusion: 5.9 mL  Total IN: 1518.4 mL    OUT:    Indwelling Catheter - Urethral: 1345 mL  Total OUT: 1345 mL    Total NET: 173.4 mL      11-26-18 @ 07:01  -  11-26-18 @ 09:34  --------------------------------------------------------  IN:    heparin Infusion: 51 mL    pantoprazole Infusion: 30 mL  Total IN: 81 mL    OUT:    Indwelling Catheter - Urethral: 180 mL  Total OUT: 180 mL    Total NET: -99 mL          General:  HEENT:  PERRLA         Lymph Nodes: NO cervical LN, supple, no JVD   Lungs: dec bs both bases  Cardiovascular: Regular   Abdomen: Soft, Positive BS, non tender   Extremities: No clubbing, no edema   Skin: INTACT, warm, no edema   Neurological: MOVES ALL EXT, alert oriented       LABS:                          9.9    22.06 )-----------( 229      ( 26 Nov 2018 04:20 )             30.0                                               11-26    135  |  94<L>  |  46<H>  ----------------------------<  130<H>  5.1<H>   |  25  |  0.9    Ca    8.8      26 Nov 2018 04:20  Phos  2.7     11-26  Mg     2.8     11-26    TPro  6.6  /  Alb  3.4<L>  /  TBili  0.5  /  DBili  x   /  AST  27  /  ALT  52<H>  /  AlkPhos  157<H>  11-26      PTT - ( 26 Nov 2018 00:28 )  PTT:53.3 sec                                                                                     LIVER FUNCTIONS - ( 26 Nov 2018 04:20 )  Alb: 3.4 g/dL / Pro: 6.6 g/dL / ALK PHOS: 157 U/L / ALT: 52 U/L / AST: 27 U/L / GGT: x                                                  Culture - Blood (collected 24 Nov 2018 06:16)  Source: .Blood None  Preliminary Report (25 Nov 2018 19:01):    No growth to date.                                                   Mode: AC/ CMV (Assist Control/ Continuous Mandatory Ventilation)  RR (machine): 16  TV (machine): 450  FiO2: 30  PEEP: 5  PS: 5                                      ABG - ( 25 Nov 2018 14:51 )  pH, Arterial: 7.41  pH, Blood: x     /  pCO2: 43    /  pO2: 95    / HCO3: 27    / Base Excess: 2.2   /  SaO2: 97              Blood Gas Arterial, Lactate: 0.9 mmoL/L (11-25-18 @ 14:51)  Blood Gas Arterial, Lactate: 0.9 mmoL/L (11-25-18 @ 12:37)        MEDICATIONS  (STANDING):  atorvastatin 40 milliGRAM(s) Oral at bedtime  chlorhexidine 4% Liquid 1 Application(s) Topical <User Schedule>  docusate sodium 100 milliGRAM(s) Oral two times a day  folic acid 1 milliGRAM(s) Oral daily  heparin  Infusion 14.5 Unit(s)/Hr (17 mL/Hr) IV Continuous <Continuous>  levETIRAcetam  IVPB 1500 milliGRAM(s) IV Intermittent every 12 hours  meropenem  IVPB 500 milliGRAM(s) IV Intermittent every 12 hours  meropenem  IVPB      pantoprazole Infusion 8 mG/Hr (10 mL/Hr) IV Continuous <Continuous>  phenytoin  IVPB 200 milliGRAM(s) IV Intermittent four times a day  senna 2 Tablet(s) Oral at bedtime  sodium bicarbonate 650 milliGRAM(s) Oral three times a day  thiamine Injectable 100 milliGRAM(s) IV Push daily  vancomycin  IVPB 1250 milliGRAM(s) IV Intermittent daily    MEDICATIONS  (PRN):      Xrays:         negative

## 2018-11-27 LAB
ALBUMIN SERPL ELPH-MCNC: 3.3 G/DL — LOW (ref 3.5–5.2)
ALP SERPL-CCNC: 158 U/L — HIGH (ref 30–115)
ALT FLD-CCNC: 42 U/L — HIGH (ref 0–41)
ANION GAP SERPL CALC-SCNC: 15 MMOL/L — HIGH (ref 7–14)
APTT BLD: 58.9 SEC — HIGH (ref 27–39.2)
AST SERPL-CCNC: 29 U/L — SIGNIFICANT CHANGE UP (ref 0–41)
BASOPHILS # BLD AUTO: 0.08 K/UL — SIGNIFICANT CHANGE UP (ref 0–0.2)
BASOPHILS NFR BLD AUTO: 0.4 % — SIGNIFICANT CHANGE UP (ref 0–1)
BILIRUB SERPL-MCNC: 0.5 MG/DL — SIGNIFICANT CHANGE UP (ref 0.2–1.2)
BUN SERPL-MCNC: 31 MG/DL — HIGH (ref 10–20)
CALCIUM SERPL-MCNC: 8.9 MG/DL — SIGNIFICANT CHANGE UP (ref 8.5–10.1)
CHLORIDE SERPL-SCNC: 99 MMOL/L — SIGNIFICANT CHANGE UP (ref 98–110)
CO2 SERPL-SCNC: 26 MMOL/L — SIGNIFICANT CHANGE UP (ref 17–32)
CREAT SERPL-MCNC: 0.7 MG/DL — SIGNIFICANT CHANGE UP (ref 0.7–1.5)
CULTURE RESULTS: NO GROWTH — SIGNIFICANT CHANGE UP
EOSINOPHIL # BLD AUTO: 0.07 K/UL — SIGNIFICANT CHANGE UP (ref 0–0.7)
EOSINOPHIL NFR BLD AUTO: 0.3 % — SIGNIFICANT CHANGE UP (ref 0–8)
GLUCOSE SERPL-MCNC: 118 MG/DL — HIGH (ref 70–99)
HCT VFR BLD CALC: 29 % — LOW (ref 42–52)
HGB BLD-MCNC: 9.6 G/DL — LOW (ref 14–18)
IMM GRANULOCYTES NFR BLD AUTO: 5.6 % — HIGH (ref 0.1–0.3)
LYMPHOCYTES # BLD AUTO: 12.9 % — LOW (ref 20.5–51.1)
LYMPHOCYTES # BLD AUTO: 2.88 K/UL — SIGNIFICANT CHANGE UP (ref 1.2–3.4)
MAGNESIUM SERPL-MCNC: 2.7 MG/DL — HIGH (ref 1.8–2.4)
MCHC RBC-ENTMCNC: 29.9 PG — SIGNIFICANT CHANGE UP (ref 27–31)
MCHC RBC-ENTMCNC: 33.1 G/DL — SIGNIFICANT CHANGE UP (ref 32–37)
MCV RBC AUTO: 90.3 FL — SIGNIFICANT CHANGE UP (ref 80–94)
MONOCYTES # BLD AUTO: 2.75 K/UL — HIGH (ref 0.1–0.6)
MONOCYTES NFR BLD AUTO: 12.3 % — HIGH (ref 1.7–9.3)
NEUTROPHILS # BLD AUTO: 15.34 K/UL — HIGH (ref 1.4–6.5)
NEUTROPHILS NFR BLD AUTO: 68.5 % — SIGNIFICANT CHANGE UP (ref 42.2–75.2)
NRBC # BLD: 0 /100 WBCS — SIGNIFICANT CHANGE UP (ref 0–0)
PLATELET # BLD AUTO: 237 K/UL — SIGNIFICANT CHANGE UP (ref 130–400)
POTASSIUM SERPL-MCNC: 4.5 MMOL/L — SIGNIFICANT CHANGE UP (ref 3.5–5)
POTASSIUM SERPL-SCNC: 4.5 MMOL/L — SIGNIFICANT CHANGE UP (ref 3.5–5)
PROT SERPL-MCNC: 6.6 G/DL — SIGNIFICANT CHANGE UP (ref 6–8)
RBC # BLD: 3.21 M/UL — LOW (ref 4.7–6.1)
RBC # FLD: 14.2 % — SIGNIFICANT CHANGE UP (ref 11.5–14.5)
SODIUM SERPL-SCNC: 140 MMOL/L — SIGNIFICANT CHANGE UP (ref 135–146)
SPECIMEN SOURCE: SIGNIFICANT CHANGE UP
VANCOMYCIN TROUGH SERPL-MCNC: 13.5 UG/ML — HIGH (ref 5–10)
WBC # BLD: 22.38 K/UL — HIGH (ref 4.8–10.8)
WBC # FLD AUTO: 22.38 K/UL — HIGH (ref 4.8–10.8)

## 2018-11-27 RX ORDER — ENOXAPARIN SODIUM 100 MG/ML
50 INJECTION SUBCUTANEOUS
Qty: 0 | Refills: 0 | Status: DISCONTINUED | OUTPATIENT
Start: 2018-11-27 | End: 2018-11-27

## 2018-11-27 RX ORDER — SODIUM CHLORIDE 9 MG/ML
1000 INJECTION INTRAMUSCULAR; INTRAVENOUS; SUBCUTANEOUS
Qty: 0 | Refills: 0 | Status: DISCONTINUED | OUTPATIENT
Start: 2018-11-27 | End: 2018-11-30

## 2018-11-27 RX ORDER — ENOXAPARIN SODIUM 100 MG/ML
100 INJECTION SUBCUTANEOUS EVERY 12 HOURS
Qty: 0 | Refills: 0 | Status: DISCONTINUED | OUTPATIENT
Start: 2018-11-27 | End: 2018-12-05

## 2018-11-27 RX ADMIN — LEVETIRACETAM 1500 MILLIGRAM(S): 250 TABLET, FILM COATED ORAL at 06:00

## 2018-11-27 RX ADMIN — Medication 650 MILLIGRAM(S): at 14:09

## 2018-11-27 RX ADMIN — MEROPENEM 100 MILLIGRAM(S): 1 INJECTION INTRAVENOUS at 05:59

## 2018-11-27 RX ADMIN — PANTOPRAZOLE SODIUM 40 MILLIGRAM(S): 20 TABLET, DELAYED RELEASE ORAL at 17:17

## 2018-11-27 RX ADMIN — SENNA PLUS 2 TABLET(S): 8.6 TABLET ORAL at 22:55

## 2018-11-27 RX ADMIN — Medication 200 MILLIGRAM(S): at 23:42

## 2018-11-27 RX ADMIN — LEVETIRACETAM 1500 MILLIGRAM(S): 250 TABLET, FILM COATED ORAL at 17:17

## 2018-11-27 RX ADMIN — Medication 1 MILLIGRAM(S): at 12:59

## 2018-11-27 RX ADMIN — Medication 650 MILLIGRAM(S): at 22:55

## 2018-11-27 RX ADMIN — Medication 650 MILLIGRAM(S): at 06:00

## 2018-11-27 RX ADMIN — CHLORHEXIDINE GLUCONATE 1 APPLICATION(S): 213 SOLUTION TOPICAL at 06:01

## 2018-11-27 RX ADMIN — Medication 100 MILLIGRAM(S): at 17:17

## 2018-11-27 RX ADMIN — ATORVASTATIN CALCIUM 40 MILLIGRAM(S): 80 TABLET, FILM COATED ORAL at 22:56

## 2018-11-27 RX ADMIN — SODIUM CHLORIDE 75 MILLILITER(S): 9 INJECTION INTRAMUSCULAR; INTRAVENOUS; SUBCUTANEOUS at 03:39

## 2018-11-27 RX ADMIN — Medication 200 MILLIGRAM(S): at 06:00

## 2018-11-27 RX ADMIN — Medication 200 MILLIGRAM(S): at 17:17

## 2018-11-27 RX ADMIN — Medication 100 MILLIGRAM(S): at 12:59

## 2018-11-27 RX ADMIN — Medication 200 MILLIGRAM(S): at 12:59

## 2018-11-27 RX ADMIN — Medication 100 MILLIGRAM(S): at 06:00

## 2018-11-27 RX ADMIN — PANTOPRAZOLE SODIUM 40 MILLIGRAM(S): 20 TABLET, DELAYED RELEASE ORAL at 06:00

## 2018-11-27 RX ADMIN — Medication 166.67 MILLIGRAM(S): at 05:59

## 2018-11-27 NOTE — PROGRESS NOTE ADULT - ASSESSMENT
IMPRESSION: ACUTE HYPOXIC RESPIRATORY FAILURE/ PE/ NO RV STRAIN ON CT/ EXTENSIVE DVT/ CVA/ GFF/ RENAL FAILURE, STAGE SACRAL ULCER/ ? GI BLEED/ SUBSTANCE ABUSE/ MRSA BACTEREMIA/ REPEAT CX NEG      PLAN:    CNS: no depressants     HEENT:  Oral care    PULMONARY:  HOB @ 45 degrees, wean off oxygen     CARDIOVASCULAR: CE, ECHO, CARDIO EVAL IVF, IV HEPARIN, SAVANNAH,     GI: GI prophylaxis                                          Feeding PROTONIX Q 12H    RENAL:  F/u  lytes.  Correct as needed. accurate I/O, RENAL EVAL    INFECTIOUS DISEASE: ABX/ BURN EVAL, ID f/u d/c jordi d/c lines     HEMATOLOGICAL:  DVT prophylaxis. VASCULAR F/UP    ENDOCRINE:  Follow up FS.  Insulin protocol if needed.      CODE STATUS: FULL CODE    DISPOSITION: d/g to floor after SAVANNAH       POOR PROGNOSIS IMPRESSION: ACUTE HYPOXIC RESPIRATORY FAILURE/ PE/ NO RV STRAIN ON CT/ EXTENSIVE DVT/ CVA/ GFF/ RENAL FAILURE, STAGE SACRAL ULCER/ ? GI BLEED/ SUBSTANCE ABUSE/ MRSA BACTEREMIA/ REPEAT CX NEG      PLAN:    CNS: no depressants     HEENT:  Oral care    PULMONARY:  HOB @ 45 degrees, wean off oxygen     CARDIOVASCULAR: CE, ECHO, CARDIO EVAL IVF, IV HEPARIN, SAVANNAH, coumadin if no ci    GI: GI prophylaxis                                          Feeding PROTONIX Q 12H    RENAL:  F/u  lytes.  Correct as needed. accurate I/O, RENAL EVAL    INFECTIOUS DISEASE: ABX/ BURN EVAL, ID f/u d/c jordi d/c lines keep vanco, check trough    HEMATOLOGICAL:  DVT prophylaxis. VASCULAR F/UP    ENDOCRINE:  Follow up FS.  Insulin protocol if needed.      CODE STATUS: FULL CODE    DISPOSITION: d/g to floor after SAVANNAH       POOR PROGNOSIS

## 2018-11-27 NOTE — CHART NOTE - NSCHARTNOTEFT_GEN_A_CORE
called by RN for iv access.  patient refused for iv line to be placed.  counceled the patient regarding the need for iv heparin and iv abx.  patient refused, knowing that it will have negative consequences on him, including death.  patient is competent to make decision, AAO *3, knows the reasen for admission and nessicity for medical treatment.  d/w 2nd year resident and pulm fellow on call.

## 2018-11-27 NOTE — PROGRESS NOTE ADULT - ASSESSMENT
57M, PMHx of seizure disorder, DLD, presents to the ED sp fall from NH. Pt was brought in by EMS from his nursing home due to a fall and then experienced SOB.  Pt reported b/l lateral chest pain and difficulty breathing.X Patient was is respiratory distress and hypoxic to 80s on RA and tachypneic, and was intubated for airway protection. In ED, he was found to have b/l pe without strain, melina with HAGMA and lactic acidosis, coffee ground emesis. Admitted to CCU. No further episodes of emesis. Sedated with propofol and fentanyl. Blood culture positive for MRSA, on jordi and vanc. Vanc trough noted.     # Acute hypoxic respiratory failure 2/2 b/l PE with no RV strain   - Extubated without difficulty.   - speech swallow: dysphagia 2 with nectar thick liquid, 1:1 feed  - hob @ 45 degrees  - on heparin drip  - Echo summary:   1. Septal Motion is dysynchronous.   2. In subcostal views RV appears normal      Not visualized in other views      Pulmonary pressures not able to be evlauate.   3. Mild mitral annular calcification.  - As per GI, heparin drip for PE outweighs risks    # MRSA bacteremia   # r/o bacterial endocarditis  - SAVANNAH today   - blood cx + for gram positive cocci, MRSA  - repeat blood cx negative   - Urine culture sent, f/u  - WBC count elevated- no change  - ID consult: D/c meropenem, d/c mckeon, d/c multilumen catheter, c/w vanc    # Extensive DVT   - on heparin drip   - f/u ptt goal 50-60  - vascular on board: c/w heparin drip    # CVA  - Neuro on board: brain MRI. maintain ptt 50-60, keep BP ~140     #Thiamine deficiency  -c/w thiamine PO 100mg daily     # Renal failure  - Monitor K and EKG closely  - No urgent RRT needed yet, may need soon  - Nephro following: no immediate need for RRT, but may need it soon    #stage 2 sacral ulcer  - does not appear to be infected  - wound care per nursing   - burn eval: no intervention, c/w wound care, signed off    #coffee ground emesis  - GI f/u: d/c protonix drip, ok to start hep drip, will consider EGD when more stable  - protonix PO BID   - f/u h&h, stable     CODE STATUS: FULL CODE  (family contacted, wants pt full code)   PALLIATIVE CARE EVAL  POOR PROGNOSIS

## 2018-11-27 NOTE — PROGRESS NOTE ADULT - SUBJECTIVE AND OBJECTIVE BOX
SUBJECTIVE:    Patient is a 58y old Male who presents with a chief complaint of sob and fall (27 Nov 2018 10:58)    Currently admitted to medicine with the primary diagnosis of Acute respiratory failure with hypoxia     Today is hospital day 4d.   OVERNIGHT EVENTS No acute events, SAVANNAH today    PAST MEDICAL & SURGICAL HISTORY  HLD (hyperlipidemia)  HTN (hypertension)  Seizures  Unilateral inguinal hernia with obstruction and without gangrene, recurrence not specified  No significant past surgical history          ALLERGIES:  No Known Allergies    MEDICATIONS:  STANDING MEDICATIONS  atorvastatin 40 milliGRAM(s) Oral at bedtime  chlorhexidine 4% Liquid 1 Application(s) Topical <User Schedule>  docusate sodium 100 milliGRAM(s) Oral two times a day  folic acid 1 milliGRAM(s) Oral daily  heparin  Infusion 1700 Unit(s)/Hr IV Continuous <Continuous>  levETIRAcetam 1500 milliGRAM(s) Oral two times a day  pantoprazole    Tablet 40 milliGRAM(s) Oral two times a day  phenytoin   Capsule 200 milliGRAM(s) Oral four times a day  senna 2 Tablet(s) Oral at bedtime  sodium bicarbonate 650 milliGRAM(s) Oral three times a day  sodium chloride 0.9%. 1000 milliLiter(s) IV Continuous <Continuous>  thiamine 100 milliGRAM(s) Oral daily  vancomycin  IVPB 1250 milliGRAM(s) IV Intermittent daily    PRN MEDICATIONS    VITALS:   T(F): 98.6  HR: 100  BP: 136/73  RR: 18  SpO2: 97%          LABS:                        9.6    22.38 )-----------( 237      ( 27 Nov 2018 04:30 )             29.0     11-27    140  |  99  |  31<H>  ----------------------------<  118<H>  4.5   |  26  |  0.7    Ca    8.9      27 Nov 2018 04:30  Phos  2.7     11-26  Mg     2.7     11-27    TPro  6.6  /  Alb  3.3<L>  /  TBili  0.5  /  DBili  x   /  AST  29  /  ALT  42<H>  /  AlkPhos  158<H>  11-27    PTT - ( 27 Nov 2018 05:00 )  PTT:58.9 sec  Urinalysis Basic - ( 26 Nov 2018 09:44 )    Color: Yellow / Appearance: Clear / SG: >=1.030 / pH: x  Gluc: x / Ketone: Negative  / Bili: Negative / Urobili: 0.2   Blood: x / Protein: 30 / Nitrite: Negative   Leuk Esterase: Negative / RBC: 6-10 /HPF / WBC x   Sq Epi: x / Non Sq Epi: x / Bacteria: Few /HPF      ABG - ( 25 Nov 2018 14:51 )  pH, Arterial: 7.41  pH, Blood: x     /  pCO2: 43    /  pO2: 95    / HCO3: 27    / Base Excess: 2.2   /  SaO2: 97                        RADIOLOGY:    PHYSICAL EXAM:  GEN: NAD  LUNGS: CTAB  HEART: tachycardic s1s2 present  ABD: soft nontender nondistended  EXT: +edema, color change  NEURO: aaox1

## 2018-11-27 NOTE — ED PROCEDURE NOTE - ATTENDING CONTRIBUTION TO CARE
I personally evaluated the patient. I reviewed the Resident’s or Physician Assistant’s note (as assigned above), and agree with the findings and plan except as documented in my note.
I was present for and supervised the key/critical aspects of the procedures performed during the care of the patient.
I was present for and supervised the key/critical aspects of the procedures performed during the care of this patient.

## 2018-11-27 NOTE — PROGRESS NOTE ADULT - ATTENDING COMMENTS
Pre-procedure Assessment:  Patient seen and examined. I agree with the history and physical which I have reviewed and noted any changes below.  11-27-18 @ 10:59

## 2018-11-27 NOTE — PROGRESS NOTE ADULT - ASSESSMENT
stage 3 pressure sore sacrum--> healing    rec: soap and water qd, cont current dressing     no surgery needed    will sign off

## 2018-11-27 NOTE — PROVIDER CONTACT NOTE (OTHER) - ASSESSMENT
Patient noted with low urinary output of 40 ml in two hours. Heparin drip infusing at 1800 units and patient is NPO for procedure in AM.

## 2018-11-27 NOTE — CONSULT NOTE ADULT - ASSESSMENT
57M, PMHx of seizure disorder, DLD, presents to the ED sp fall from NH. Pt was brought in by EMS from his nursing home due to a fall and then experienced SOB.  Pt reported b/l lateral chest pain and difficulty breathing.X Patient was is respiratory distress and hypoxic to 80s on RA and tachypneic, and was intubated for airway protection. In ED, he was found to have extensive  b/l PE involving multiple lobes with extensive bilateral LEs DVT    ORSA bacteremia with no obvious focus. L IJ catheter was placed on the same day.  Unclear if the Bcx were drawn during placementt of the line as only 1 of 2 Bcx were positive    IMPRESSION:  ORSA bacteremia.  R/o endocarditis  No evidence of psoas abscess/ vertebral infection  Sacral ulcer is not infected and is not the source    RECOMMENDATIONS:  SAVANNAH planned for this am  D/c L IJ catheter  D/c meropenem  Vancomycin 1250 mg iv q12h.  Check vanco level today  repeat Bcx

## 2018-11-27 NOTE — PROGRESS NOTE ADULT - ATTENDING COMMENTS
SAVANNAH cancelled - Unable to get consent from HCP    Will tentatively reschedule for tomorrow SAVANNAH cancelled - Unable to obtain phone consent from HCP Jaren Thorne [phone]    Will tentatively reschedule for tomorrow

## 2018-11-27 NOTE — PROGRESS NOTE ADULT - SUBJECTIVE AND OBJECTIVE BOX
Over Night Events:  off pressors   Scheduled for SAVANNAH      ROS:  See HPI    PHYSICAL EXAM    ICU Vital Signs Last 24 Hrs  T(C): 36.8 (27 Nov 2018 04:00), Max: 37.3 (26 Nov 2018 16:00)  T(F): 98.2 (27 Nov 2018 04:00), Max: 99.1 (26 Nov 2018 16:00)  HR: 100 (27 Nov 2018 06:00) (100 - 110)  BP: 131/68 (27 Nov 2018 06:00) (107/50 - 144/70)  BP(mean): 92 (27 Nov 2018 06:00) (78 - 102)  RR: 16 (27 Nov 2018 06:00) (16 - 20)  SpO2: 99% (27 Nov 2018 06:00) (98% - 100%)        11-26-18 @ 07:01  -  11-27-18 @ 07:00  --------------------------------------------------------  IN:    heparin Infusion: 221 mL    heparin Infusion: 161 mL    IV PiggyBack: 750 mL    Oral Fluid: 360 mL    pantoprazole Infusion: 50 mL    sodium chloride 0.9%.: 300 mL    Solution: 50 mL  Total IN: 1892 mL    OUT:    Indwelling Catheter - Urethral: 1335 mL  Total OUT: 1335 mL    Total NET: 557 mL          General:  HEENT:  PERRLA         Lymph Nodes: NO cervical LN, supple, no JVD   Lungs: Bilateral BS, no wheeze or rhonci  Cardiovascular: Regular   Abdomen: Soft, Positive BS, non tender   Extremities: No clubbing, no edema   Skin: INTACT, warm, no edema   Neurological: MOVES ALL EXT, alert oriented       LABS:                          9.6    22.38 )-----------( 237      ( 27 Nov 2018 04:30 )             29.0                                               11-27    140  |  99  |  31<H>  ----------------------------<  118<H>  4.5   |  26  |  0.7    Ca    8.9      27 Nov 2018 04:30  Phos  2.7     11-26  Mg     2.7     11-27    TPro  6.6  /  Alb  3.3<L>  /  TBili  0.5  /  DBili  x   /  AST  29  /  ALT  42<H>  /  AlkPhos  158<H>  11-27      PTT - ( 27 Nov 2018 05:00 )  PTT:58.9 sec                                       Urinalysis Basic - ( 26 Nov 2018 09:44 )    Color: Yellow / Appearance: Clear / SG: >=1.030 / pH: x  Gluc: x / Ketone: Negative  / Bili: Negative / Urobili: 0.2   Blood: x / Protein: 30 / Nitrite: Negative   Leuk Esterase: Negative / RBC: 6-10 /HPF / WBC x   Sq Epi: x / Non Sq Epi: x / Bacteria: Few /HPF                                                  LIVER FUNCTIONS - ( 27 Nov 2018 04:30 )  Alb: 3.3 g/dL / Pro: 6.6 g/dL / ALK PHOS: 158 U/L / ALT: 42 U/L / AST: 29 U/L / GGT: x                                                                                                                                   ABG - ( 25 Nov 2018 14:51 )  pH, Arterial: 7.41  pH, Blood: x     /  pCO2: 43    /  pO2: 95    / HCO3: 27    / Base Excess: 2.2   /  SaO2: 97                    MEDICATIONS  (STANDING):  atorvastatin 40 milliGRAM(s) Oral at bedtime  chlorhexidine 4% Liquid 1 Application(s) Topical <User Schedule>  docusate sodium 100 milliGRAM(s) Oral two times a day  folic acid 1 milliGRAM(s) Oral daily  heparin  Infusion 1700 Unit(s)/Hr (18 mL/Hr) IV Continuous <Continuous>  levETIRAcetam 1500 milliGRAM(s) Oral two times a day  pantoprazole    Tablet 40 milliGRAM(s) Oral two times a day  phenytoin   Capsule 200 milliGRAM(s) Oral four times a day  senna 2 Tablet(s) Oral at bedtime  sodium bicarbonate 650 milliGRAM(s) Oral three times a day  sodium chloride 0.9%. 1000 milliLiter(s) (75 mL/Hr) IV Continuous <Continuous>  thiamine 100 milliGRAM(s) Oral daily  vancomycin  IVPB 1250 milliGRAM(s) IV Intermittent daily    MEDICATIONS  (PRN):      Xrays:    reviewed                                                                    ECHO Over Night Events:      off pressors   Scheduled for SAVANNAH  feels better want to eat  s/p ID eval      ROS:  See HPI    PHYSICAL EXAM    ICU Vital Signs Last 24 Hrs  T(C): 36.8 (27 Nov 2018 04:00), Max: 37.3 (26 Nov 2018 16:00)  T(F): 98.2 (27 Nov 2018 04:00), Max: 99.1 (26 Nov 2018 16:00)  HR: 100 (27 Nov 2018 06:00) (100 - 110)  BP: 131/68 (27 Nov 2018 06:00) (107/50 - 144/70)  BP(mean): 92 (27 Nov 2018 06:00) (78 - 102)  RR: 16 (27 Nov 2018 06:00) (16 - 20)  SpO2: 99% (27 Nov 2018 06:00) (98% - 100%)        11-26-18 @ 07:01  -  11-27-18 @ 07:00  --------------------------------------------------------  IN:    heparin Infusion: 221 mL    heparin Infusion: 161 mL    IV PiggyBack: 750 mL    Oral Fluid: 360 mL    pantoprazole Infusion: 50 mL    sodium chloride 0.9%.: 300 mL    Solution: 50 mL  Total IN: 1892 mL    OUT:    Indwelling Catheter - Urethral: 1335 mL  Total OUT: 1335 mL    Total NET: 557 mL          General:  HEENT:  PERRLA         Lymph Nodes: NO cervical LN, supple, no JVD   Lungs: Bilateral BS, no wheeze or rhonci  Cardiovascular: Regular   Abdomen: Soft, Positive BS, non tender   Extremities: leg swelling  Skin: sacral ulcer  Neurological: MOVES ALL EXT, alert oriented       LABS:                          9.6    22.38 )-----------( 237      ( 27 Nov 2018 04:30 )             29.0                                               11-27    140  |  99  |  31<H>  ----------------------------<  118<H>  4.5   |  26  |  0.7    Ca    8.9      27 Nov 2018 04:30  Phos  2.7     11-26  Mg     2.7     11-27    TPro  6.6  /  Alb  3.3<L>  /  TBili  0.5  /  DBili  x   /  AST  29  /  ALT  42<H>  /  AlkPhos  158<H>  11-27      PTT - ( 27 Nov 2018 05:00 )  PTT:58.9 sec                                       Urinalysis Basic - ( 26 Nov 2018 09:44 )    Color: Yellow / Appearance: Clear / SG: >=1.030 / pH: x  Gluc: x / Ketone: Negative  / Bili: Negative / Urobili: 0.2   Blood: x / Protein: 30 / Nitrite: Negative   Leuk Esterase: Negative / RBC: 6-10 /HPF / WBC x   Sq Epi: x / Non Sq Epi: x / Bacteria: Few /HPF                                                  LIVER FUNCTIONS - ( 27 Nov 2018 04:30 )  Alb: 3.3 g/dL / Pro: 6.6 g/dL / ALK PHOS: 158 U/L / ALT: 42 U/L / AST: 29 U/L / GGT: x                                                                                                                                   ABG - ( 25 Nov 2018 14:51 )  pH, Arterial: 7.41  pH, Blood: x     /  pCO2: 43    /  pO2: 95    / HCO3: 27    / Base Excess: 2.2   /  SaO2: 97                    MEDICATIONS  (STANDING):  atorvastatin 40 milliGRAM(s) Oral at bedtime  chlorhexidine 4% Liquid 1 Application(s) Topical <User Schedule>  docusate sodium 100 milliGRAM(s) Oral two times a day  folic acid 1 milliGRAM(s) Oral daily  heparin  Infusion 1700 Unit(s)/Hr (18 mL/Hr) IV Continuous <Continuous>  levETIRAcetam 1500 milliGRAM(s) Oral two times a day  pantoprazole    Tablet 40 milliGRAM(s) Oral two times a day  phenytoin   Capsule 200 milliGRAM(s) Oral four times a day  senna 2 Tablet(s) Oral at bedtime  sodium bicarbonate 650 milliGRAM(s) Oral three times a day  sodium chloride 0.9%. 1000 milliLiter(s) (75 mL/Hr) IV Continuous <Continuous>  thiamine 100 milliGRAM(s) Oral daily  vancomycin  IVPB 1250 milliGRAM(s) IV Intermittent daily    MEDICATIONS  (PRN):      Xrays:    reviewed

## 2018-11-27 NOTE — CONSULT NOTE ADULT - SUBJECTIVE AND OBJECTIVE BOX
LIN BENTON  58y, Male  Allergy: No Known Allergies  Seafood (Unknown)      HPI:  57M, PMHx of seizure disorder, DLD, presents to the ED sp fall from NH. Pt was brought in by EMS from his nursing home due to a fall and then experienced SOB. It is unclear which event happened first, either the SOB or fall. During assessment patient was is respiratory distress and hypoxic to 80s on RA and tachypneic. Pt reports b/l lateral chest pain and difficulty breathing. Pt doesn't know how he fell, denies HT/LOC. Pt was unable to provide further history due to intubation    Pt was intubated in the ED for airway protection.   In ed, he was found to have b/l pe without strain, melina with HAGMA and lactic acidosis, coffee ground emesis. (23 Nov 2018 04:26)    Presently in the CCU, alert, responsive, extubated. No pressors    FAMILY HISTORY:  No pertinent family history in first degree relatives    PAST MEDICAL & SURGICAL HISTORY:  HLD (hyperlipidemia)  HTN (hypertension)  Seizures  Unilateral inguinal hernia with obstruction and without gangrene, recurrence not specified  No significant past surgical history        VITALS:  T(F): 98.2, Max: 99.1 (11-26-18 @ 16:00)  HR: 100  BP: 131/68  RR: 16Vital Signs Last 24 Hrs  T(C): 36.8 (27 Nov 2018 04:00), Max: 37.3 (26 Nov 2018 16:00)  T(F): 98.2 (27 Nov 2018 04:00), Max: 99.1 (26 Nov 2018 16:00)  HR: 100 (27 Nov 2018 06:00) (98 - 110)  BP: 131/68 (27 Nov 2018 06:00) (107/50 - 144/70)  BP(mean): 92 (27 Nov 2018 06:00) (78 - 102)  RR: 16 (27 Nov 2018 06:00) (14 - 20)  SpO2: 99% (27 Nov 2018 06:00) (98% - 100%)    TESTS & MEASUREMENTS:                        9.6    22.38 )-----------( 237      ( 27 Nov 2018 04:30 )             29.0     11-26    135  |  94<L>  |  46<H>  ----------------------------<  130<H>  5.1<H>   |  25  |  0.9    Ca    8.8      26 Nov 2018 04:20  Phos  2.7     11-26  Mg     2.8     11-26    TPro  6.6  /  Alb  3.4<L>  /  TBili  0.5  /  DBili  x   /  AST  27  /  ALT  52<H>  /  AlkPhos  157<H>  11-26    LIVER FUNCTIONS - ( 26 Nov 2018 04:20 )  Alb: 3.4 g/dL / Pro: 6.6 g/dL / ALK PHOS: 157 U/L / ALT: 52 U/L / AST: 27 U/L / GGT: x             Culture - Blood (collected 11-24-18 @ 06:16)  Source: .Blood None  Preliminary Report (11-25-18 @ 19:01):    No growth to date.    Culture - Blood (collected 11-23-18 @ 02:40)  Source: .Blood Blood  Gram Stain (11-24-18 @ 05:13):    Growth in aerobic bottle: Gram Positive Cocci in Clusters  Final Report (11-26-18 @ 10:53):    Growth in aerobic bottle: Methicillin resistant Staphylococcus aureus    "Due to technical problems, Proteus sp. will Not be reported as part of    the BCID panel until further notice"    ***Blood Panel PCR results on this specimen are available    approximately 3 hours after the Gram stain result.***    Gram stain, PCR, and/or culture results may not always    correspond due to difference in methodologies.    ************************************************************    This PCR assay was performed using [email or web address].    The following targets are tested for: Enterococcus,    vancomycin resistant enterococci, Listeria monocytogenes,    coagulase negative staphylococci, S. aureus,    methicillin resistant S. aureus, Streptococcus agalactiae    (Group B), S. pneumoniae, S. pyogenes (Group A),    Acinetobacter baumannii, Enterobacter cloacae, E. coli,    Klebsiella oxytoca, K. pneumoniae, Proteus sp.,    Serratia marcescens, Haemophilus influenzae,    Neisseria meningitidis, Pseudomonas aeruginosa, Candida    albicans, C. glabrata, C krusei, C parapsilosis,    C. tropicalis and the KPC resistance gene.  Organism: Blood Culture PCR  Methicillin resistant Staphylococcus aureus (11-26-18 @ 10:53)  Organism: Methicillin resistant Staphylococcus aureus (11-26-18 @ 10:53)      -  Ampicillin/Sulbactam: R <=8/4      -  Cefazolin: R <=4      -  Clindamycin: S <=0.25      -  Daptomycin: S 0.5      -  Erythromycin: R >4      -  Gentamicin: S <=1 Should not be used as monotherapy      -  Linezolid: S 4      -  Oxacillin: R >2      -  Penicillin: R >8      -  RIF- Rifampin: S <=1 Should not be used as monotherapy      -  Tetra/Doxy: S <=1      -  Trimethoprim/Sulfamethoxazole: S <=0.5/9.5      -  Vancomycin: S 1      Method Type: SUN  Organism: Blood Culture PCR (11-26-18 @ 10:53)      -  Methicillin resistant Staphylococcus aureus (MRSA): Detec      Method Type: PCR    Culture - Blood (collected 11-23-18 @ 02:40)  Source: .Blood Blood  Preliminary Report (11-24-18 @ 13:02):    No growth to date.      Urinalysis Basic - ( 26 Nov 2018 09:44 )    Color: Yellow / Appearance: Clear / SG: >=1.030 / pH: x  Gluc: x / Ketone: Negative  / Bili: Negative / Urobili: 0.2   Blood: x / Protein: 30 / Nitrite: Negative   Leuk Esterase: Negative / RBC: 6-10 /HPF / WBC x   Sq Epi: x / Non Sq Epi: x / Bacteria: Few /HPF          RADIOLOGY & ADDITIONAL TESTS:    ANTIBIOTICS:  meropenem  IVPB 500 milliGRAM(s) IV Intermittent every 12 hours  meropenem  IVPB      vancomycin  IVPB 1250 milliGRAM(s) IV Intermittent daily

## 2018-11-28 LAB
ALBUMIN SERPL ELPH-MCNC: 3.4 G/DL — LOW (ref 3.5–5.2)
ALP SERPL-CCNC: 145 U/L — HIGH (ref 30–115)
ALT FLD-CCNC: 56 U/L — HIGH (ref 0–41)
ANION GAP SERPL CALC-SCNC: 17 MMOL/L — HIGH (ref 7–14)
APTT BLD: 31.3 SEC — SIGNIFICANT CHANGE UP (ref 27–39.2)
AST SERPL-CCNC: 61 U/L — HIGH (ref 0–41)
AT III ACT/NOR PPP CHRO: 113 % — SIGNIFICANT CHANGE UP (ref 85–135)
BASOPHILS # BLD AUTO: 0.11 K/UL — SIGNIFICANT CHANGE UP (ref 0–0.2)
BASOPHILS NFR BLD AUTO: 0.5 % — SIGNIFICANT CHANGE UP (ref 0–1)
BILIRUB SERPL-MCNC: 0.4 MG/DL — SIGNIFICANT CHANGE UP (ref 0.2–1.2)
BUN SERPL-MCNC: 24 MG/DL — HIGH (ref 10–20)
CALCIUM SERPL-MCNC: 9.1 MG/DL — SIGNIFICANT CHANGE UP (ref 8.5–10.1)
CHLORIDE SERPL-SCNC: 103 MMOL/L — SIGNIFICANT CHANGE UP (ref 98–110)
CO2 SERPL-SCNC: 25 MMOL/L — SIGNIFICANT CHANGE UP (ref 17–32)
CREAT SERPL-MCNC: 0.7 MG/DL — SIGNIFICANT CHANGE UP (ref 0.7–1.5)
CULTURE RESULTS: SIGNIFICANT CHANGE UP
EOSINOPHIL # BLD AUTO: 0.08 K/UL — SIGNIFICANT CHANGE UP (ref 0–0.7)
EOSINOPHIL NFR BLD AUTO: 0.4 % — SIGNIFICANT CHANGE UP (ref 0–8)
GLUCOSE SERPL-MCNC: 98 MG/DL — SIGNIFICANT CHANGE UP (ref 70–99)
HCT VFR BLD CALC: 31.4 % — LOW (ref 42–52)
HGB BLD-MCNC: 9.9 G/DL — LOW (ref 14–18)
IMM GRANULOCYTES NFR BLD AUTO: 7.8 % — HIGH (ref 0.1–0.3)
LYMPHOCYTES # BLD AUTO: 11.4 % — LOW (ref 20.5–51.1)
LYMPHOCYTES # BLD AUTO: 2.51 K/UL — SIGNIFICANT CHANGE UP (ref 1.2–3.4)
MAGNESIUM SERPL-MCNC: 2.5 MG/DL — HIGH (ref 1.8–2.4)
MCHC RBC-ENTMCNC: 29.2 PG — SIGNIFICANT CHANGE UP (ref 27–31)
MCHC RBC-ENTMCNC: 31.5 G/DL — LOW (ref 32–37)
MCV RBC AUTO: 92.6 FL — SIGNIFICANT CHANGE UP (ref 80–94)
MONOCYTES # BLD AUTO: 2.17 K/UL — HIGH (ref 0.1–0.6)
MONOCYTES NFR BLD AUTO: 9.9 % — HIGH (ref 1.7–9.3)
NEUTROPHILS # BLD AUTO: 15.38 K/UL — HIGH (ref 1.4–6.5)
NEUTROPHILS NFR BLD AUTO: 70 % — SIGNIFICANT CHANGE UP (ref 42.2–75.2)
NRBC # BLD: 0 /100 WBCS — SIGNIFICANT CHANGE UP (ref 0–0)
PLATELET # BLD AUTO: 235 K/UL — SIGNIFICANT CHANGE UP (ref 130–400)
POTASSIUM SERPL-MCNC: 4.2 MMOL/L — SIGNIFICANT CHANGE UP (ref 3.5–5)
POTASSIUM SERPL-SCNC: 4.2 MMOL/L — SIGNIFICANT CHANGE UP (ref 3.5–5)
PROT SERPL-MCNC: 6.7 G/DL — SIGNIFICANT CHANGE UP (ref 6–8)
RBC # BLD: 3.39 M/UL — LOW (ref 4.7–6.1)
RBC # FLD: 14.6 % — HIGH (ref 11.5–14.5)
SODIUM SERPL-SCNC: 145 MMOL/L — SIGNIFICANT CHANGE UP (ref 135–146)
SPECIMEN SOURCE: SIGNIFICANT CHANGE UP
WBC # BLD: 21.97 K/UL — HIGH (ref 4.8–10.8)
WBC # FLD AUTO: 21.97 K/UL — HIGH (ref 4.8–10.8)

## 2018-11-28 RX ORDER — VANCOMYCIN HCL 1 G
1250 VIAL (EA) INTRAVENOUS EVERY 12 HOURS
Qty: 0 | Refills: 0 | Status: DISCONTINUED | OUTPATIENT
Start: 2018-11-28 | End: 2018-11-28

## 2018-11-28 RX ORDER — VANCOMYCIN HCL 1 G
VIAL (EA) INTRAVENOUS
Qty: 0 | Refills: 0 | Status: DISCONTINUED | OUTPATIENT
Start: 2018-11-28 | End: 2018-12-06

## 2018-11-28 RX ORDER — VANCOMYCIN HCL 1 G
1250 VIAL (EA) INTRAVENOUS ONCE
Qty: 0 | Refills: 0 | Status: COMPLETED | OUTPATIENT
Start: 2018-11-28 | End: 2018-11-28

## 2018-11-28 RX ORDER — VANCOMYCIN HCL 1 G
1250 VIAL (EA) INTRAVENOUS EVERY 12 HOURS
Qty: 0 | Refills: 0 | Status: DISCONTINUED | OUTPATIENT
Start: 2018-11-28 | End: 2018-12-06

## 2018-11-28 RX ADMIN — PANTOPRAZOLE SODIUM 40 MILLIGRAM(S): 20 TABLET, DELAYED RELEASE ORAL at 18:07

## 2018-11-28 RX ADMIN — Medication 650 MILLIGRAM(S): at 14:36

## 2018-11-28 RX ADMIN — Medication 200 MILLIGRAM(S): at 18:06

## 2018-11-28 RX ADMIN — LEVETIRACETAM 1500 MILLIGRAM(S): 250 TABLET, FILM COATED ORAL at 18:05

## 2018-11-28 RX ADMIN — SENNA PLUS 2 TABLET(S): 8.6 TABLET ORAL at 22:06

## 2018-11-28 RX ADMIN — Medication 1 MILLIGRAM(S): at 14:37

## 2018-11-28 RX ADMIN — ENOXAPARIN SODIUM 100 MILLIGRAM(S): 100 INJECTION SUBCUTANEOUS at 18:05

## 2018-11-28 RX ADMIN — Medication 200 MILLIGRAM(S): at 23:05

## 2018-11-28 RX ADMIN — Medication 100 MILLIGRAM(S): at 18:05

## 2018-11-28 RX ADMIN — Medication 200 MILLIGRAM(S): at 14:37

## 2018-11-28 RX ADMIN — Medication 650 MILLIGRAM(S): at 05:57

## 2018-11-28 RX ADMIN — SODIUM CHLORIDE 75 MILLILITER(S): 9 INJECTION INTRAMUSCULAR; INTRAVENOUS; SUBCUTANEOUS at 20:09

## 2018-11-28 RX ADMIN — SODIUM CHLORIDE 75 MILLILITER(S): 9 INJECTION INTRAMUSCULAR; INTRAVENOUS; SUBCUTANEOUS at 05:54

## 2018-11-28 RX ADMIN — Medication 166.67 MILLIGRAM(S): at 18:07

## 2018-11-28 RX ADMIN — Medication 200 MILLIGRAM(S): at 05:56

## 2018-11-28 RX ADMIN — Medication 100 MILLIGRAM(S): at 14:36

## 2018-11-28 RX ADMIN — CHLORHEXIDINE GLUCONATE 1 APPLICATION(S): 213 SOLUTION TOPICAL at 05:54

## 2018-11-28 RX ADMIN — ATORVASTATIN CALCIUM 40 MILLIGRAM(S): 80 TABLET, FILM COATED ORAL at 22:06

## 2018-11-28 RX ADMIN — Medication 650 MILLIGRAM(S): at 22:06

## 2018-11-28 RX ADMIN — Medication 166.67 MILLIGRAM(S): at 06:54

## 2018-11-28 RX ADMIN — PANTOPRAZOLE SODIUM 40 MILLIGRAM(S): 20 TABLET, DELAYED RELEASE ORAL at 05:56

## 2018-11-28 RX ADMIN — ENOXAPARIN SODIUM 100 MILLIGRAM(S): 100 INJECTION SUBCUTANEOUS at 05:55

## 2018-11-28 RX ADMIN — LEVETIRACETAM 1500 MILLIGRAM(S): 250 TABLET, FILM COATED ORAL at 05:55

## 2018-11-28 NOTE — PROGRESS NOTE ADULT - SUBJECTIVE AND OBJECTIVE BOX
BENTON LIN  58y, Male      OVERNIGHT EVENTS:    no fevers, no pressors, 2 BMs last night    VITALS:  T(F): 98.9, Max: 98.9 (11-28-18 @ 04:00)  HR: 112  BP: 118/72  RR: 21Vital Signs Last 24 Hrs  T(C): 37.2 (28 Nov 2018 04:00), Max: 37.2 (28 Nov 2018 04:00)  T(F): 98.9 (28 Nov 2018 04:00), Max: 98.9 (28 Nov 2018 04:00)  HR: 112 (28 Nov 2018 04:00) (98 - 114)  BP: 118/72 (28 Nov 2018 04:00) (98/53 - 146/72)  BP(mean): 90 (28 Nov 2018 04:00) (82 - 99)  RR: 21 (28 Nov 2018 04:00) (14 - 24)  SpO2: 97% (28 Nov 2018 04:00) (94% - 99%)    TESTS & MEASUREMENTS:                        9.6    22.38 )-----------( 237      ( 27 Nov 2018 04:30 )             29.0     11-27    140  |  99  |  31<H>  ----------------------------<  118<H>  4.5   |  26  |  0.7    Ca    8.9      27 Nov 2018 04:30  Mg     2.7     11-27    TPro  6.6  /  Alb  3.3<L>  /  TBili  0.5  /  DBili  x   /  AST  29  /  ALT  42<H>  /  AlkPhos  158<H>  11-27    LIVER FUNCTIONS - ( 27 Nov 2018 04:30 )  Alb: 3.3 g/dL / Pro: 6.6 g/dL / ALK PHOS: 158 U/L / ALT: 42 U/L / AST: 29 U/L / GGT: x             Culture - Urine (collected 11-26-18 @ 09:44)  Source: .Urine Clean Catch (Midstream)  Final Report (11-27-18 @ 22:50):    No growth    Culture - Blood (collected 11-24-18 @ 06:16)  Source: .Blood None  Preliminary Report (11-25-18 @ 19:01):    No growth to date.    Culture - Blood (collected 11-23-18 @ 02:40)  Source: .Blood Blood  Gram Stain (11-24-18 @ 05:13):    Growth in aerobic bottle: Gram Positive Cocci in Clusters  Final Report (11-26-18 @ 10:53):    Growth in aerobic bottle: Methicillin resistant Staphylococcus aureus    "Due to technical problems, Proteus sp. will Not be reported as part of    the BCID panel until further notice"    ***Blood Panel PCR results on this specimen are available    approximately 3 hours after the Gram stain result.***    Gram stain, PCR, and/or culture results may not always    correspond due to difference in methodologies.    ************************************************************    This PCR assay was performed using [employer].    The following targets are tested for: Enterococcus,    vancomycin resistant enterococci, Listeria monocytogenes,    coagulase negative staphylococci, S. aureus,    methicillin resistant S. aureus, Streptococcus agalactiae    (Group B), S. pneumoniae, S. pyogenes (Group A),    Acinetobacter baumannii, Enterobacter cloacae, E. coli,    Klebsiella oxytoca, K. pneumoniae, Proteus sp.,    Serratia marcescens, Haemophilus influenzae,    Neisseria meningitidis, Pseudomonas aeruginosa, Candida    albicans, C. glabrata, C krusei, C parapsilosis,    C. tropicalis and the KPC resistance gene.  Organism: Blood Culture PCR  Methicillin resistant Staphylococcus aureus (11-26-18 @ 10:53)  Organism: Methicillin resistant Staphylococcus aureus (11-26-18 @ 10:53)      -  Ampicillin/Sulbactam: R <=8/4      -  Cefazolin: R <=4      -  Clindamycin: S <=0.25      -  Daptomycin: S 0.5      -  Erythromycin: R >4      -  Gentamicin: S <=1 Should not be used as monotherapy      -  Linezolid: S 4      -  Oxacillin: R >2      -  Penicillin: R >8      -  RIF- Rifampin: S <=1 Should not be used as monotherapy      -  Tetra/Doxy: S <=1      -  Trimethoprim/Sulfamethoxazole: S <=0.5/9.5      -  Vancomycin: S 1      Method Type: SUN  Organism: Blood Culture PCR (11-26-18 @ 10:53)      -  Methicillin resistant Staphylococcus aureus (MRSA): Detec      Method Type: PCR    Culture - Blood (collected 11-23-18 @ 02:40)  Source: .Blood Blood  Preliminary Report (11-24-18 @ 13:02):    No growth to date.      Urinalysis Basic - ( 26 Nov 2018 09:44 )    Color: Yellow / Appearance: Clear / SG: >=1.030 / pH: x  Gluc: x / Ketone: Negative  / Bili: Negative / Urobili: 0.2   Blood: x / Protein: 30 / Nitrite: Negative   Leuk Esterase: Negative / RBC: 6-10 /HPF / WBC x   Sq Epi: x / Non Sq Epi: x / Bacteria: Few /HPF          RADIOLOGY & ADDITIONAL TESTS:    ANTIBIOTICS:  vancomycin  IVPB 1250 milliGRAM(s) IV Intermittent daily

## 2018-11-28 NOTE — PROGRESS NOTE ADULT - SUBJECTIVE AND OBJECTIVE BOX
Over Night Events:  overall improved      ROS:  See HPI    PHYSICAL EXAM    ICU Vital Signs Last 24 Hrs  T(C): 37.2 (28 Nov 2018 04:00), Max: 37.2 (28 Nov 2018 04:00)  T(F): 98.9 (28 Nov 2018 04:00), Max: 98.9 (28 Nov 2018 04:00)  HR: 110 (28 Nov 2018 06:00) (98 - 114)  BP: 116/71 (28 Nov 2018 06:00) (115/68 - 146/72)  BP(mean): 93 (28 Nov 2018 06:00) (85 - 99)  RR: 18 (28 Nov 2018 06:00) (14 - 24)  SpO2: 95% (28 Nov 2018 06:00) (94% - 99%)        11-27-18 @ 07:01  -  11-28-18 @ 07:00  --------------------------------------------------------  IN:    heparin Infusion: 36 mL    IV PiggyBack: 250 mL    sodium chloride 0.9%.: 1125 mL  Total IN: 1411 mL    OUT:    Indwelling Catheter - Urethral: 40 mL    Voided: 795 mL  Total OUT: 835 mL    Total NET: 576 mL          General:  HEENT:  PERRLA         Lymph Nodes: NO cervical LN, supple, no JVD   Lungs: Bilateral BS, no wheeze or rhonci  Cardiovascular: Regular   Abdomen: Soft, Positive BS, non tender   Extremities: No clubbing, no edema   Skin: INTACT, warm, no edema   Neurological: MOVES ALL EXT, alert oriented       LABS:                          9.6    22.38 )-----------( 237      ( 27 Nov 2018 04:30 )             29.0                                               11-27    140  |  99  |  31<H>  ----------------------------<  118<H>  4.5   |  26  |  0.7    Ca    8.9      27 Nov 2018 04:30  Mg     2.7     11-27    TPro  6.6  /  Alb  3.3<L>  /  TBili  0.5  /  DBili  x   /  AST  29  /  ALT  42<H>  /  AlkPhos  158<H>  11-27      PTT - ( 27 Nov 2018 05:00 )  PTT:58.9 sec                                       Urinalysis Basic - ( 26 Nov 2018 09:44 )    Color: Yellow / Appearance: Clear / SG: >=1.030 / pH: x  Gluc: x / Ketone: Negative  / Bili: Negative / Urobili: 0.2   Blood: x / Protein: 30 / Nitrite: Negative   Leuk Esterase: Negative / RBC: 6-10 /HPF / WBC x   Sq Epi: x / Non Sq Epi: x / Bacteria: Few /HPF                                                  LIVER FUNCTIONS - ( 27 Nov 2018 04:30 )  Alb: 3.3 g/dL / Pro: 6.6 g/dL / ALK PHOS: 158 U/L / ALT: 42 U/L / AST: 29 U/L / GGT: x                                                  Culture - Urine (collected 26 Nov 2018 09:44)  Source: .Urine Clean Catch (Midstream)  Final Report (27 Nov 2018 22:50):    No growth                                                                                             MEDICATIONS  (STANDING):  atorvastatin 40 milliGRAM(s) Oral at bedtime  chlorhexidine 4% Liquid 1 Application(s) Topical <User Schedule>  docusate sodium 100 milliGRAM(s) Oral two times a day  enoxaparin Injectable 100 milliGRAM(s) SubCutaneous every 12 hours  folic acid 1 milliGRAM(s) Oral daily  levETIRAcetam 1500 milliGRAM(s) Oral two times a day  pantoprazole    Tablet 40 milliGRAM(s) Oral two times a day  phenytoin   Capsule 200 milliGRAM(s) Oral four times a day  senna 2 Tablet(s) Oral at bedtime  sodium bicarbonate 650 milliGRAM(s) Oral three times a day  sodium chloride 0.9%. 1000 milliLiter(s) (75 mL/Hr) IV Continuous <Continuous>  thiamine 100 milliGRAM(s) Oral daily  vancomycin  IVPB 1250 milliGRAM(s) IV Intermittent every 12 hours  vancomycin  IVPB        MEDICATIONS  (PRN):      Xrays:                                                                                     ECHO Over Night Events:    overall improved, no SAVANNAH      ROS:  See HPI    PHYSICAL EXAM    ICU Vital Signs Last 24 Hrs  T(C): 37.2 (28 Nov 2018 04:00), Max: 37.2 (28 Nov 2018 04:00)  T(F): 98.9 (28 Nov 2018 04:00), Max: 98.9 (28 Nov 2018 04:00)  HR: 110 (28 Nov 2018 06:00) (98 - 114)  BP: 116/71 (28 Nov 2018 06:00) (115/68 - 146/72)  BP(mean): 93 (28 Nov 2018 06:00) (85 - 99)  RR: 18 (28 Nov 2018 06:00) (14 - 24)  SpO2: 95% (28 Nov 2018 06:00) (94% - 99%)        11-27-18 @ 07:01  -  11-28-18 @ 07:00  --------------------------------------------------------  IN:    heparin Infusion: 36 mL    IV PiggyBack: 250 mL    sodium chloride 0.9%.: 1125 mL  Total IN: 1411 mL    OUT:    Indwelling Catheter - Urethral: 40 mL    Voided: 795 mL  Total OUT: 835 mL    Total NET: 576 mL          General:  HEENT:  PERRLA         Lymph Nodes: NO cervical LN, supple, no JVD   Lungs: Lynette RHONCHI  Cardiovascular: Regular   Abdomen: Soft, Positive BS, non tender   Extremities: No clubbing, no edema   Skin: SACRAL ULCER  Neurological: MOVES ALL EXT, alert oriented       LABS:                          9.6    22.38 )-----------( 237      ( 27 Nov 2018 04:30 )             29.0                                               11-27    140  |  99  |  31<H>  ----------------------------<  118<H>  4.5   |  26  |  0.7    Ca    8.9      27 Nov 2018 04:30  Mg     2.7     11-27    TPro  6.6  /  Alb  3.3<L>  /  TBili  0.5  /  DBili  x   /  AST  29  /  ALT  42<H>  /  AlkPhos  158<H>  11-27      PTT - ( 27 Nov 2018 05:00 )  PTT:58.9 sec                                       Urinalysis Basic - ( 26 Nov 2018 09:44 )    Color: Yellow / Appearance: Clear / SG: >=1.030 / pH: x  Gluc: x / Ketone: Negative  / Bili: Negative / Urobili: 0.2   Blood: x / Protein: 30 / Nitrite: Negative   Leuk Esterase: Negative / RBC: 6-10 /HPF / WBC x   Sq Epi: x / Non Sq Epi: x / Bacteria: Few /HPF                                                  LIVER FUNCTIONS - ( 27 Nov 2018 04:30 )  Alb: 3.3 g/dL / Pro: 6.6 g/dL / ALK PHOS: 158 U/L / ALT: 42 U/L / AST: 29 U/L / GGT: x                                                  Culture - Urine (collected 26 Nov 2018 09:44)  Source: .Urine Clean Catch (Midstream)  Final Report (27 Nov 2018 22:50):    No growth                                                                                             MEDICATIONS  (STANDING):  atorvastatin 40 milliGRAM(s) Oral at bedtime  chlorhexidine 4% Liquid 1 Application(s) Topical <User Schedule>  docusate sodium 100 milliGRAM(s) Oral two times a day  enoxaparin Injectable 100 milliGRAM(s) SubCutaneous every 12 hours  folic acid 1 milliGRAM(s) Oral daily  levETIRAcetam 1500 milliGRAM(s) Oral two times a day  pantoprazole    Tablet 40 milliGRAM(s) Oral two times a day  phenytoin   Capsule 200 milliGRAM(s) Oral four times a day  senna 2 Tablet(s) Oral at bedtime  sodium bicarbonate 650 milliGRAM(s) Oral three times a day  sodium chloride 0.9%. 1000 milliLiter(s) (75 mL/Hr) IV Continuous <Continuous>  thiamine 100 milliGRAM(s) Oral daily  vancomycin  IVPB 1250 milliGRAM(s) IV Intermittent every 12 hours  vancomycin  IVPB        MEDICATIONS  (PRN):      Xrays REVIEWED

## 2018-11-28 NOTE — PROGRESS NOTE ADULT - ASSESSMENT
57M, PMHx of seizure disorder, DLD, presents to the ED sp fall from NH. Pt was brought in by EMS from his nursing home due to a fall and then experienced SOB.  Pt reported b/l lateral chest pain and difficulty breathing.X Patient was is respiratory distress and hypoxic to 80s on RA and tachypneic, and was intubated for airway protection. In ED, he was found to have b/l pe without strain, melina with HAGMA and lactic acidosis, coffee ground emesis. Admitted to CCU. No further episodes of emesis. Sedated with propofol and fentanyl. Blood culture positive for MRSA, on jordi and vanc. Vanc trough noted.     # Acute hypoxic respiratory failure 2/2 b/l PE with no RV strain   - s/p extubation   - speech swallow: dysphagia 2 with nectar thick liquid, 1:1 feed  - hob @ 45 degrees  - Echo summary:   1. Septal Motion is dysynchronous.   2. In subcostal views RV appears normal      Not visualized in other views      Pulmonary pressures not able to be evlauate.   3. Mild mitral annular calcification.  - As per GI, heparin drip for PE outweighs risks  - transfer to tele     # MRSA bacteremia   # r/o bacterial endocarditis  - SAVANNAH today 11/28/18  - blood cx + for gram positive cocci, MRSA  - repeat blood cx negative   - Urine culture sent, f/u  - WBC count elevated- no change  - ID consult:, c/w vanc    # Extensive DVT   - changed heparin drip to therapeutic lovenox     # CVA  - Neuro on board: brain MRI.  keep BP ~140     #Thiamine deficiency  -c/w thiamine PO 100mg daily     # Renal failure  - Monitor K and EKG closely  - No urgent RRT needed yet, may need soon  - Nephro following: no immediate need for RRT, but may need it soon    #stage 2 sacral ulcer  - does not appear to be infected  - wound care per nursing   - burn eval: no intervention, c/w wound care, signed off    #coffee ground emesis  - GI f/u: d/c protonix drip, ok to start hep drip, will consider EGD when more stable  - protonix PO BID   - f/u h&h, stable     CODE STATUS: FULL CODE  (family contacted, wants pt full code)   POOR PROGNOSIS

## 2018-11-28 NOTE — PROGRESS NOTE ADULT - ASSESSMENT
IMPRESSION: ACUTE HYPOXIC RESPIRATORY FAILURE/ PE/ NO RV STRAIN ON CT/ EXTENSIVE DVT/ CVA/ GFF/ RENAL FAILURE, STAGE SACRAL ULCER/ ? GI BLEED/ SUBSTANCE ABUSE/ MRSA BACTEREMIA/ REPEAT CX NEG FOR SAVANNAH      PLAN:    CNS: no depressants     HEENT:  Oral care    PULMONARY:  HOB @ 45 degrees, wean off oxygen     CARDIOVASCULAR: CE, ECHO, CARDIO EVAL IVF, IV HEPARIN, SAVANNAH, coumadin if no ci    GI: GI prophylaxis                                          Feeding PROTONIX Q 12H    RENAL:  F/u  lytes.  Correct as needed. accurate I/O, RENAL EVAL    INFECTIOUS DISEASE: ABX/ BURN EVAL, ID f/u d/c jordi d/c lines keep vanco, check trough    HEMATOLOGICAL:  DVT prophylaxis. VASCULAR F/UP    ENDOCRINE:  Follow up FS.  Insulin protocol if needed.      CODE STATUS: FULL CODE    DISPOSITION: d/g to floor after ASVANNAH       POOR PROGNOSIS

## 2018-11-28 NOTE — PROGRESS NOTE ADULT - ASSESSMENT
IMPRESSION:  ORSA bacteremia.  R/o endocarditis  No evidence of psoas abscess/ vertebral infection  Sacral ulcer is not infected and is not the source    RECOMMENDATIONS:  SAVANNAH planned   Vancomycin 1250 mg iv q12h.  repeat Bcx

## 2018-11-28 NOTE — CHART NOTE - NSCHARTNOTEFT_GEN_A_CORE
Registered Dietitian Follow-Up     Patient Profile Reviewed                           Yes [x]   No []     Nutrition History Previously Obtained        Yes []  No [x] Pt intubated on initial assessment. Reports adequate appetite and PO intake ~ 2meals/day PTA. NKFA. Denies use of nutrition supplements. Unsure of recent wt changes       Pertinent Subjective Information:  -Pt sitting upright in bed at time of assessment. Pt with occasional difficulty recalling info but able to provide appropriate responses to RD questions. NPO for procedure today but as per RN, pt has not gone yet and if rescheduled, will provide dinner tray. Pt states he is very hungry and requesting meal. Also agreeable to supplement as medically able.      Pertinent Medical Interventions: Pt admitted for Acute respiratory failure with hypoxia s/p extubation 11/25   r/o bacterial endocarditis SAVANNAH intended today 11/28   MRSA bacteremia   Renal failure--no urgent need for RRT at this time per Nephro    Coffee ground emesis--possible EGD when more stable.      Diet order: NPO for procedure (was Dysphagia II, mechanical soft ground with nectar thick liquids + DASH/TLC diet)     Anthropometrics:  - Ht. 190.5cm   - Wt. 99.9kg (11/28)   - %wt change--vs 97.6kg admit wt. wt change likely 2/2 fluid accumulation. will continue to monitor   - BMI 27.5 (using 11/28 wt)   - IBW 89.1kg      Pertinent Lab Data: (11/28/18) WBC 21.97, RBC 3.39, H/H 9.9/31.4, BUN 24, MG 2.5     Pertinent Meds: lovenox, abx, NS @ 75mL/hr, keppra, protonix, dilantin, thiamine, colace, senna, atorvastatin, folic acid, sodium bicarbonate      Physical Findings:  - Appearance: alert, confused upon information recall   - GI function: no episodes of coffee ground emesis today as per RN; LBM 11/28   - Tubes: O2 via NC   - Oral/Mouth cavity: SLP reccs 11/28 Dysphagia II, nectar thick liquids   - Skin: stage III sacral pressure ulcer; 1+ pitting edema b/l legs      Nutrition Requirements  Weight Used: 97.6kg admit wt, 89.1kg IBW (adjusted s/p extubation)      Estimated Energy Needs    Adjust [x] (MSJ x 1.2-1.5 d/t stage III pressure ulcer)   Adjusted Energy Recommendations: 7560-2722 kcal/day     Estimated Protein Needs    Continue [x]  107-133 g/day (1.2-1.5 g/kg IBW d/t 1+ edema) - taking into account renal function vs. critical illness & pressure wound.  Estimated Fluid Needs        Continue [x] per CCU team      Nutrient Intake: not meeting needs      [x] Previous Nutrition Diagnosis: Less than optimal enteral nutrition infusion---adjusted to inadequate oral intake r/t nutritionally insufficient diet order             [x] Ongoing          [] Resolved       Nutrition Intervention: meals and snacks, medical food supplements, vitamins/minerals  Reccs:  1. Add Ensure Enlive BID + Prosource Gelatin Q24H.  2. Resume Diet order per SLP + DASH/TLC diet as medically feasible.  3. Consider adding daily multivitamin to promote wound healing.      Goal/Expected Outcome: As diet advance, pt to consume/tolerate >75% meals, snacks and supplements. f/u in 3 days.      Indicator/Monitoring: RD will monitor Energy intake, glucose profile, renal profile, nutrition focused physical findings, body composition Registered Dietitian Follow-Up     Patient Profile Reviewed                           Yes [x]   No []     Nutrition History Previously Obtained        Yes []  No [x] Pt intubated on initial assessment. Reports adequate appetite and PO intake ~ 2meals/day PTA. NKFA. Denies use of nutrition supplements. Unsure of recent wt changes       Pertinent Subjective Information:  -Pt sitting upright in bed at time of assessment. Pt with occasional difficulty recalling info but able to provide appropriate responses to RD questions. NPO for procedure today but as per RN, pt has not gone yet and if rescheduled, will provide dinner tray. Pt states he is very hungry and requesting meal. Also agreeable to supplement as medically able.      Pertinent Medical Interventions: Pt admitted for Acute respiratory failure with hypoxia s/p extubation 11/25   r/o bacterial endocarditis SAVANNAH intended today 11/28   MRSA bacteremia   Renal failure--no urgent need for RRT at this time per Nephro    Coffee ground emesis--possible EGD when more stable.      Diet order: NPO for procedure (was Dysphagia II, mechanical soft ground with nectar thick liquids + DASH/TLC diet)     Anthropometrics:  - Ht. 190.5cm   - Wt. 99.9kg (11/28)   - %wt change--vs 97.6kg admit wt. wt change likely 2/2 fluid accumulation. will continue to monitor   - BMI 27.5 (using 11/28 wt)   - IBW 89.1kg      Pertinent Lab Data: (11/28/18) WBC 21.97, RBC 3.39, H/H 9.9/31.4, BUN 24, MG 2.5     Pertinent Meds: lovenox, abx, NS @ 75mL/hr, keppra, protonix, dilantin, thiamine, colace, senna, atorvastatin, folic acid, sodium bicarbonate      Physical Findings:  - Appearance: alert, confused upon information recall   - GI function: no episodes of coffee ground emesis today as per RN; LBM 11/28   - Tubes: O2 via NC   - Oral/Mouth cavity: SLP reccs 11/28 Dysphagia II, nectar thick liquids   - Skin: stage III sacral pressure ulcer; 1+ pitting edema b/l legs      Nutrition Requirements  Weight Used: 97.6kg admit wt, 89.1kg IBW (adjusted s/p extubation)      Estimated Energy Needs    Adjust [x] (MSJ x 1.2-1.5 d/t stage III pressure ulcer)   Adjusted Energy Recommendations: 4488-9900 kcal/day     Estimated Protein Needs    Continue [x]  107-133 g/day (1.2-1.5 g/kg IBW d/t 1+ edema) - taking into account renal function vs. critical illness & pressure wound.  Estimated Fluid Needs        Continue [x] per CCU team      Nutrient Intake: not meeting needs      [x] Previous Nutrition Diagnosis: Less than optimal enteral nutrition infusion---adjusted to inadequate oral intake r/t nutritionally insufficient diet order             [x] Ongoing          [] Resolved       Nutrition Intervention: meals and snacks, medical food supplements, vitamins/minerals  Reccs:  1. Add Ensure Enlive BID + Prosource Gelatin Q24H. Provide thickener packet with Ensure.   2. Resume Diet order per SLP + DASH/TLC diet as medically feasible.  3. Consider adding daily multivitamin to promote wound healing.      Goal/Expected Outcome: As diet advance, pt to consume/tolerate >75% meals, snacks and supplements. f/u in 3 days.      Indicator/Monitoring: RD will monitor Energy intake, glucose profile, renal profile, nutrition focused physical findings, body composition

## 2018-11-28 NOTE — PROGRESS NOTE ADULT - SUBJECTIVE AND OBJECTIVE BOX
SUBJECTIVE:    Patient is a 58y old Male who presents with a chief complaint of sob and fall (28 Nov 2018 09:13)    Currently admitted to medicine with the primary diagnosis of Acute respiratory failure with hypoxia     Today is hospital day 5d.   OVERNIGHT EVENTS no acute events, IV access obtained, changed heparin drip to therapeutic lovenox, SAVANNAH today     PAST MEDICAL & SURGICAL HISTORY  HLD (hyperlipidemia)  HTN (hypertension)  Seizures  Unilateral inguinal hernia with obstruction and without gangrene, recurrence not specified  No significant past surgical history          ALLERGIES:  No Known Allergies    MEDICATIONS:  STANDING MEDICATIONS  atorvastatin 40 milliGRAM(s) Oral at bedtime  chlorhexidine 4% Liquid 1 Application(s) Topical <User Schedule>  docusate sodium 100 milliGRAM(s) Oral two times a day  enoxaparin Injectable 100 milliGRAM(s) SubCutaneous every 12 hours  folic acid 1 milliGRAM(s) Oral daily  levETIRAcetam 1500 milliGRAM(s) Oral two times a day  pantoprazole    Tablet 40 milliGRAM(s) Oral two times a day  phenytoin   Capsule 200 milliGRAM(s) Oral four times a day  senna 2 Tablet(s) Oral at bedtime  sodium bicarbonate 650 milliGRAM(s) Oral three times a day  sodium chloride 0.9%. 1000 milliLiter(s) IV Continuous <Continuous>  thiamine 100 milliGRAM(s) Oral daily  vancomycin  IVPB 1250 milliGRAM(s) IV Intermittent every 12 hours  vancomycin  IVPB        PRN MEDICATIONS    VITALS:   T(F): 98.9  HR: 108  BP: 118/58  RR: 19  SpO2: 93%          LABS:                        9.6    22.38 )-----------( 237      ( 27 Nov 2018 04:30 )             29.0     11-27    140  |  99  |  31<H>  ----------------------------<  118<H>  4.5   |  26  |  0.7    Ca    8.9      27 Nov 2018 04:30  Mg     2.7     11-27    TPro  6.6  /  Alb  3.3<L>  /  TBili  0.5  /  DBili  x   /  AST  29  /  ALT  42<H>  /  AlkPhos  158<H>  11-27    PTT - ( 27 Nov 2018 05:00 )  PTT:58.9 sec          Culture - Urine (collected 26 Nov 2018 09:44)  Source: .Urine Clean Catch (Midstream)  Final Report (27 Nov 2018 22:50):    No growth          RADIOLOGY:    PHYSICAL EXAM:  GEN: NAD  LUNGS: CTAB  HEART: tachycardic  ABD: soft nontender nondistended  EXT: LE edema  NEURO: aao x2

## 2018-11-29 LAB
ALBUMIN SERPL ELPH-MCNC: 3.1 G/DL — LOW (ref 3.5–5.2)
ALP SERPL-CCNC: 132 U/L — HIGH (ref 30–115)
ALT FLD-CCNC: 72 U/L — HIGH (ref 0–41)
ANION GAP SERPL CALC-SCNC: 17 MMOL/L — HIGH (ref 7–14)
AST SERPL-CCNC: 86 U/L — HIGH (ref 0–41)
BASOPHILS # BLD AUTO: 0.11 K/UL — SIGNIFICANT CHANGE UP (ref 0–0.2)
BASOPHILS NFR BLD AUTO: 0.5 % — SIGNIFICANT CHANGE UP (ref 0–1)
BILIRUB SERPL-MCNC: 0.5 MG/DL — SIGNIFICANT CHANGE UP (ref 0.2–1.2)
BUN SERPL-MCNC: 20 MG/DL — SIGNIFICANT CHANGE UP (ref 10–20)
CALCIUM SERPL-MCNC: 8.8 MG/DL — SIGNIFICANT CHANGE UP (ref 8.5–10.1)
CHLORIDE SERPL-SCNC: 106 MMOL/L — SIGNIFICANT CHANGE UP (ref 98–110)
CO2 SERPL-SCNC: 23 MMOL/L — SIGNIFICANT CHANGE UP (ref 17–32)
CREAT SERPL-MCNC: 0.6 MG/DL — LOW (ref 0.7–1.5)
CULTURE RESULTS: SIGNIFICANT CHANGE UP
EOSINOPHIL # BLD AUTO: 0.14 K/UL — SIGNIFICANT CHANGE UP (ref 0–0.7)
EOSINOPHIL NFR BLD AUTO: 0.7 % — SIGNIFICANT CHANGE UP (ref 0–8)
GLUCOSE SERPL-MCNC: 108 MG/DL — HIGH (ref 70–99)
HCT VFR BLD CALC: 29.5 % — LOW (ref 42–52)
HGB BLD-MCNC: 9.4 G/DL — LOW (ref 14–18)
IMM GRANULOCYTES NFR BLD AUTO: 6.7 % — HIGH (ref 0.1–0.3)
LYMPHOCYTES # BLD AUTO: 12.9 % — LOW (ref 20.5–51.1)
LYMPHOCYTES # BLD AUTO: 2.69 K/UL — SIGNIFICANT CHANGE UP (ref 1.2–3.4)
MAGNESIUM SERPL-MCNC: 2.3 MG/DL — SIGNIFICANT CHANGE UP (ref 1.8–2.4)
MCHC RBC-ENTMCNC: 29.3 PG — SIGNIFICANT CHANGE UP (ref 27–31)
MCHC RBC-ENTMCNC: 31.9 G/DL — LOW (ref 32–37)
MCV RBC AUTO: 91.9 FL — SIGNIFICANT CHANGE UP (ref 80–94)
MONOCYTES # BLD AUTO: 2.01 K/UL — HIGH (ref 0.1–0.6)
MONOCYTES NFR BLD AUTO: 9.6 % — HIGH (ref 1.7–9.3)
NEUTROPHILS # BLD AUTO: 14.57 K/UL — HIGH (ref 1.4–6.5)
NEUTROPHILS NFR BLD AUTO: 69.6 % — SIGNIFICANT CHANGE UP (ref 42.2–75.2)
NRBC # BLD: 0 /100 WBCS — SIGNIFICANT CHANGE UP (ref 0–0)
PHOSPHATE SERPL-MCNC: 3.6 MG/DL — SIGNIFICANT CHANGE UP (ref 2.1–4.9)
PLATELET # BLD AUTO: 297 K/UL — SIGNIFICANT CHANGE UP (ref 130–400)
POTASSIUM SERPL-MCNC: 4.4 MMOL/L — SIGNIFICANT CHANGE UP (ref 3.5–5)
POTASSIUM SERPL-SCNC: 4.4 MMOL/L — SIGNIFICANT CHANGE UP (ref 3.5–5)
PROT C ACT/NOR PPP: 144 % — HIGH (ref 65–129)
PROT SERPL-MCNC: 6.2 G/DL — SIGNIFICANT CHANGE UP (ref 6–8)
RBC # BLD: 3.21 M/UL — LOW (ref 4.7–6.1)
RBC # FLD: 14.8 % — HIGH (ref 11.5–14.5)
SODIUM SERPL-SCNC: 146 MMOL/L — SIGNIFICANT CHANGE UP (ref 135–146)
SPECIMEN SOURCE: SIGNIFICANT CHANGE UP
WBC # BLD: 20.93 K/UL — HIGH (ref 4.8–10.8)
WBC # FLD AUTO: 20.93 K/UL — HIGH (ref 4.8–10.8)

## 2018-11-29 RX ADMIN — SENNA PLUS 2 TABLET(S): 8.6 TABLET ORAL at 22:24

## 2018-11-29 RX ADMIN — Medication 650 MILLIGRAM(S): at 22:24

## 2018-11-29 RX ADMIN — Medication 1 MILLIGRAM(S): at 11:54

## 2018-11-29 RX ADMIN — Medication 650 MILLIGRAM(S): at 06:26

## 2018-11-29 RX ADMIN — PANTOPRAZOLE SODIUM 40 MILLIGRAM(S): 20 TABLET, DELAYED RELEASE ORAL at 17:04

## 2018-11-29 RX ADMIN — ENOXAPARIN SODIUM 100 MILLIGRAM(S): 100 INJECTION SUBCUTANEOUS at 17:05

## 2018-11-29 RX ADMIN — Medication 166.67 MILLIGRAM(S): at 17:08

## 2018-11-29 RX ADMIN — Medication 100 MILLIGRAM(S): at 06:26

## 2018-11-29 RX ADMIN — Medication 200 MILLIGRAM(S): at 11:54

## 2018-11-29 RX ADMIN — Medication 100 MILLIGRAM(S): at 17:04

## 2018-11-29 RX ADMIN — Medication 200 MILLIGRAM(S): at 17:04

## 2018-11-29 RX ADMIN — LEVETIRACETAM 1500 MILLIGRAM(S): 250 TABLET, FILM COATED ORAL at 17:04

## 2018-11-29 RX ADMIN — ATORVASTATIN CALCIUM 40 MILLIGRAM(S): 80 TABLET, FILM COATED ORAL at 22:24

## 2018-11-29 RX ADMIN — ENOXAPARIN SODIUM 100 MILLIGRAM(S): 100 INJECTION SUBCUTANEOUS at 06:28

## 2018-11-29 RX ADMIN — Medication 200 MILLIGRAM(S): at 06:26

## 2018-11-29 RX ADMIN — PANTOPRAZOLE SODIUM 40 MILLIGRAM(S): 20 TABLET, DELAYED RELEASE ORAL at 06:26

## 2018-11-29 RX ADMIN — Medication 650 MILLIGRAM(S): at 13:19

## 2018-11-29 RX ADMIN — LEVETIRACETAM 1500 MILLIGRAM(S): 250 TABLET, FILM COATED ORAL at 06:26

## 2018-11-29 RX ADMIN — Medication 166.67 MILLIGRAM(S): at 06:25

## 2018-11-29 RX ADMIN — CHLORHEXIDINE GLUCONATE 1 APPLICATION(S): 213 SOLUTION TOPICAL at 06:25

## 2018-11-29 RX ADMIN — Medication 100 MILLIGRAM(S): at 11:54

## 2018-11-29 RX ADMIN — SODIUM CHLORIDE 75 MILLILITER(S): 9 INJECTION INTRAMUSCULAR; INTRAVENOUS; SUBCUTANEOUS at 23:48

## 2018-11-29 NOTE — PROGRESS NOTE ADULT - ASSESSMENT
ASSESSMENT & PLAN:   Acute hypoxic respiratory failure secondary to  b/l PE with no RV strain ,, status post intubation & extubation  Acute deep venous thrombosis and pulmonary embolism   MRSA bacteremia  Dysphagia  stage 2 sacral ulcer    Endocarditis ruled out  dyslipidemia   hypertension ASSESSMENT & PLAN:   Acute hypoxic respiratory failure secondary to  b/l PE with no RV strain ,, status post intubation & extubation  Acute deep venous thrombosis and pulmonary embolism   MRSA bacteremia  Dysphagia  stage 2 sacral ulcer  Endocarditis needs to be ruled out   age indeterminate lacunar infarcts  dyslipidemia   hypertension     PLAN:  -SAVANNAH done but results still pending , need to r/o endocarditis  -MRI needs to be done for further evaluation for  age indeterminate lacunar infarcts,( Neurology recommended anti-coagualtion as benefits outweighs the risk but recommended MRI when stable, will het MRI brain)  - Currently on  anticoagulation  with Lovenox for PE and DVT  c/w abx , ASSESSMENT & PLAN:   Acute hypoxic respiratory failure secondary to  b/l PE with no RV strain ,, status post intubation & extubation  Acute deep venous thrombosis and pulmonary embolism   MRSA bacteremia  Dysphagia  stage 2 sacral ulcer  Endocarditis needs to be ruled out   age indeterminate lacunar infarcts  dyslipidemia   hypertension     PLAN:  -SAVANNAH done but results still pending , need to r/o endocarditis  -MRI needs to be done for further evaluation for  age indeterminate lacunar infarcts,( Neurology recommended anti-coagualtion as benefits outweighs the risk but recommended MRI when stable, will get MRI brain  - Currently on  anticoagulation  with Lovenox for PE and DVT  c/w abx , ASSESSMENT & PLAN:   Acute hypoxic respiratory failure secondary to  b/l PE with no RV strain ,, status post intubation & extubation  Acute deep venous thrombosis and pulmonary embolism   MRSA bacteremia  Dysphagia  stage 2 sacral ulcer  Endocarditis needs to be ruled out  age indeterminate lacunar infarcts      PLAN:  -SAVANNAH done but results still pending , need to r/o endocarditis  -MRI needs to be done for further evaluation for  age indeterminate lacunar infarcts,( Neurology recommended anti-coagualtion as benefits outweighs the risk but recommended MRI when stable, will get MRI brain  - Currently on  anticoagulation  with Lovenox for PE and DVT  - blood cx + for gram positive cocci, MRSA  - repeat blood cx negative   - Urine culture sent, f/u  - WBC count elevated- no change  - ID consult:, c/w vanco ASSESSMENT & PLAN:   Acute hypoxic respiratory failure secondary to  b/l PE with no RV strain ,, status post intubation & extubation  Acute deep venous thrombosis and pulmonary embolism   MRSA bacteremia  Dysphagia  stage 2 sacral ulcer  Endocarditis needs to be ruled out  age indeterminate lacunar infarcts      PLAN:  -SAVANNAH done but results still pending , need to r/o endocarditis  -MRI needs to be done for further evaluation for  age indeterminate lacunar infarcts,( Neurology recommended anti-coagualtion as benefits outweighs the risk but recommended MRI when stable, will get MRI brain  - Currently on  anticoagulation  with Lovenox for PE and DVT  - blood cx + for gram positive cocci, MRSA  - repeat blood cx negative   - Urine culture sent, f/u  - WBC count elevated- no change  - ID consult on board  -Thiamine deficiency-c/w thiamine PO 100mg daily    Renal failure- Monitor K and EKG closely  - No urgent HD needed yet, may need soon  - Nephro on board  -stage 2 sacral ulcer- does not appear to be infected  - wound care per nursing   - burn eval: no intervention, c/w wound care  -coffee ground emesis-  no further episodes today  -conservative management for now  - protonix PO BID   - f/u h&h, stable   Disposition: Continues to require hospitalization ASSESSMENT & PLAN:   Acute hypoxic respiratory failure secondary to  b/l PE with no RV strain ,, status post intubation & extubation  Acute deep venous thrombosis and pulmonary embolism   MRSA bacteremia      Dysphagia  Combined Metabolic & respiratory aciodsis-resolved  stage 2 sacral ulcers  Endocarditis needs to be ruled out  age indeterminate lacunar infarcts      PLAN:  -SAVANNAH done but results still pending , need to r/o endocarditis  -MRI needs to be done for further evaluation for  age indeterminate lacunar infarcts,( Neurology recommended anti-coagualtion as benefits outweighs the risk but recommended MRI when stable, will get MRI brain  - Currently on  anticoagulation  with Lovenox for PE and DVT. Heparin infusion discontinued.  - blood cx + for gram positive cocci, MRSA , repeat culture is negative  - Urine culture sent, f/u  - ID consult on board  -Thiamine deficiency-c/w thiamine PO 100mg daily    Renal failure- Monitorelectrolytes  - No urgent HD needed yet, may need soon  - Nephro on board  -stage 2 sacral ulcer- does not appear to be infected  - wound care per nursing   - burn eval: no intervention, c/w wound care  -coffee ground emesis-  no further episodes tody-conservative management for now. EGD when stable or as outpatient  - protonix PO BID   - f/u h&h, stable   Disposition: Continues to require hospitalization ASSESSMENT & PLAN:   Acute hypoxic respiratory failure secondary to  b/l PE with no RV strain ,status post intubation & extubation  Acute deep venous thrombosis and pulmonary embolism   MRSA bacteremia  Dysphagia  Combined Metabolic & respiratory aciodsis-resolved  stage 2 sacral ulcers  Endocarditis needs to be ruled out  age indeterminate lacunar infarcts  MEDHAT- resolving      PLAN:  -SAVANNAH done but results still pending , need to r/o endocarditis  -MRI needs to be done for further evaluation for  age indeterminate lacunar infarcts,( Neurology recommended anti-coagualtion as benefits outweighs the risk but recommended MRI when stable, will get MRI brain  - Currently on  anticoagulation  with Lovenox for PE and DVT. Heparin infusion discontinued.  - blood cx + for gram positive cocci, MRSA , repeat culture is negative  - Urine culture sent, f/u  - ID consult on board  -Thiamine deficiency-c/w thiamine PO 100mg daily    Renal failure- Resolving. Monitor electrolytes  - Nephro on board  -stage 2 sacral ulcer- does not appear to be infected  - wound care per nursing   - burn eval: no intervention, c/w wound care  -coffee ground emesis-  no further episodes tody-conservative management for now. EGD when stable or as outpatient  - protonix PO BID   - f/u h&h, stable   Disposition: Continues to require hospitalization

## 2018-11-29 NOTE — PROGRESS NOTE ADULT - SUBJECTIVE AND OBJECTIVE BOX
LIN BENTON MRN-[id number]    Subjective:  Seen & examine don bedside. Very agitated and refused the examination.     Vital Signs Last 24 Hrs  T(C): 35.6 (29 Nov 2018 13:21), Max: 36.8 (28 Nov 2018 18:53)  T(F): 96 (29 Nov 2018 13:21), Max: 98.3 (28 Nov 2018 18:53)  HR: 118 (29 Nov 2018 13:21) (107 - 124)  BP: 108/71 (29 Nov 2018 13:21) (108/65 - 119/68)  BP(mean): 81 (28 Nov 2018 18:05) (81 - 81)  RR: 18 (29 Nov 2018 13:21) (18 - 25)  SpO2: 98% (28 Nov 2018 18:05) (98% - 98%)    Physical Examination:  Refused examination. Very agitated      ROS:   Refused to participate in ROS      MEDICATIONS  (STANDING):  atorvastatin 40 milliGRAM(s) Oral at bedtime  chlorhexidine 4% Liquid 1 Application(s) Topical <User Schedule>  docusate sodium 100 milliGRAM(s) Oral two times a day  enoxaparin Injectable 100 milliGRAM(s) SubCutaneous every 12 hours  folic acid 1 milliGRAM(s) Oral daily  levETIRAcetam 1500 milliGRAM(s) Oral two times a day  pantoprazole    Tablet 40 milliGRAM(s) Oral two times a day  phenytoin   Capsule 200 milliGRAM(s) Oral four times a day  senna 2 Tablet(s) Oral at bedtime  sodium bicarbonate 650 milliGRAM(s) Oral three times a day  sodium chloride 0.9%. 1000 milliLiter(s) (75 mL/Hr) IV Continuous <Continuous>  thiamine 100 milliGRAM(s) Oral daily  vancomycin  IVPB 1250 milliGRAM(s) IV Intermittent every 12 hours  vancomycin  IVPB        MEDICATIONS  (PRN):                            9.4    20.93 )-----------( 297      ( 29 Nov 2018 06:58 )             29.5     11-29    146  |  106  |  20  ----------------------------<  108<H>  4.4   |  23  |  0.6<L>    Ca    8.8      29 Nov 2018 06:58  Phos  3.6     11-29  Mg     2.3     11-29    TPro  6.2  /  Alb  3.1<L>  /  TBili  0.5  /  DBili  x   /  AST  86<H>  /  ALT  72<H>  /  AlkPhos  132<H>  11-29 LIN BENTON [id number]    Subjective:  Seen & examine don bedside. Very agitated and refused the examination.     Vital Signs Last 24 Hrs  T(C): 35.6 (29 Nov 2018 13:21), Max: 36.8 (28 Nov 2018 18:53)  T(F): 96 (29 Nov 2018 13:21), Max: 98.3 (28 Nov 2018 18:53)  HR: 118 (29 Nov 2018 13:21) (107 - 124)  BP: 108/71 (29 Nov 2018 13:21) (108/65 - 119/68)  BP(mean): 81 (28 Nov 2018 18:05) (81 - 81)  RR: 18 (29 Nov 2018 13:21) (18 - 25)  SpO2: 98% (28 Nov 2018 18:05) (98% - 98%)    Physical Examination:  Refused examination. Very agitated      ROS:   ROS could not be elicited      MEDICATIONS  (STANDING):  atorvastatin 40 milliGRAM(s) Oral at bedtime  chlorhexidine 4% Liquid 1 Application(s) Topical <User Schedule>  docusate sodium 100 milliGRAM(s) Oral two times a day  enoxaparin Injectable 100 milliGRAM(s) SubCutaneous every 12 hours  folic acid 1 milliGRAM(s) Oral daily  levETIRAcetam 1500 milliGRAM(s) Oral two times a day  pantoprazole    Tablet 40 milliGRAM(s) Oral two times a day  phenytoin   Capsule 200 milliGRAM(s) Oral four times a day  senna 2 Tablet(s) Oral at bedtime  sodium bicarbonate 650 milliGRAM(s) Oral three times a day  sodium chloride 0.9%. 1000 milliLiter(s) (75 mL/Hr) IV Continuous <Continuous>  thiamine 100 milliGRAM(s) Oral daily  vancomycin  IVPB 1250 milliGRAM(s) IV Intermittent every 12 hours  vancomycin  IVPB        MEDICATIONS  (PRN):                            9.4    20.93 )-----------( 297      ( 29 Nov 2018 06:58 )             29.5     11-29    146  |  106  |  20  ----------------------------<  108<H>  4.4   |  23  |  0.6<L>    Ca    8.8      29 Nov 2018 06:58  Phos  3.6     11-29  Mg     2.3     11-29    TPro  6.2  /  Alb  3.1<L>  /  TBili  0.5  /  DBili  x   /  AST  86<H>  /  ALT  72<H>  /  AlkPhos  132<H>  11-29 LIN BENTON [id number]    Subjective:  Seen & examined on bedside. Very agitated and refused the examination.     Vital Signs Last 24 Hrs  T(C): 35.6 (29 Nov 2018 13:21), Max: 36.8 (28 Nov 2018 18:53)  T(F): 96 (29 Nov 2018 13:21), Max: 98.3 (28 Nov 2018 18:53)  HR: 118 (29 Nov 2018 13:21) (107 - 124)  BP: 108/71 (29 Nov 2018 13:21) (108/65 - 119/68)  BP(mean): 81 (28 Nov 2018 18:05) (81 - 81)  RR: 18 (29 Nov 2018 13:21) (18 - 25)  SpO2: 98% (28 Nov 2018 18:05) (98% - 98%)    Physical Examination:  Refused examination. Very agitated      ROS:   ROS could not be elicited      MEDICATIONS  (STANDING):  atorvastatin 40 milliGRAM(s) Oral at bedtime  chlorhexidine 4% Liquid 1 Application(s) Topical <User Schedule>  docusate sodium 100 milliGRAM(s) Oral two times a day  enoxaparin Injectable 100 milliGRAM(s) SubCutaneous every 12 hours  folic acid 1 milliGRAM(s) Oral daily  levETIRAcetam 1500 milliGRAM(s) Oral two times a day  pantoprazole    Tablet 40 milliGRAM(s) Oral two times a day  phenytoin   Capsule 200 milliGRAM(s) Oral four times a day  senna 2 Tablet(s) Oral at bedtime  sodium bicarbonate 650 milliGRAM(s) Oral three times a day  sodium chloride 0.9%. 1000 milliLiter(s) (75 mL/Hr) IV Continuous <Continuous>  thiamine 100 milliGRAM(s) Oral daily  vancomycin  IVPB 1250 milliGRAM(s) IV Intermittent every 12 hours  vancomycin  IVPB        MEDICATIONS  (PRN):                            9.4    20.93 )-----------( 297      ( 29 Nov 2018 06:58 )             29.5     11-29    146  |  106  |  20  ----------------------------<  108<H>  4.4   |  23  |  0.6<L>    Ca    8.8      29 Nov 2018 06:58  Phos  3.6     11-29  Mg     2.3     11-29    TPro  6.2  /  Alb  3.1<L>  /  TBili  0.5  /  DBili  x   /  AST  86<H>  /  ALT  72<H>  /  AlkPhos  132<H>  11-29

## 2018-11-29 NOTE — PROGRESS NOTE ADULT - SUBJECTIVE AND OBJECTIVE BOX
LIN BENTON  58y, Male      OVERNIGHT EVENTS:    no fevers, clinically no change    VITALS:  T(F): 96.8, Max: 98.6 (11-28-18 @ 08:05)  HR: 107  BP: 119/68  RR: 18Vital Signs Last 24 Hrs  T(C): 36 (29 Nov 2018 05:42), Max: 37 (28 Nov 2018 08:05)  T(F): 96.8 (29 Nov 2018 05:42), Max: 98.6 (28 Nov 2018 08:05)  HR: 107 (29 Nov 2018 05:42) (107 - 124)  BP: 119/68 (29 Nov 2018 05:42) (108/65 - 137/75)  BP(mean): 81 (28 Nov 2018 18:05) (79 - 95)  RR: 18 (29 Nov 2018 05:42) (18 - 35)  SpO2: 98% (28 Nov 2018 18:05) (90% - 99%)    TESTS & MEASUREMENTS:                        9.9    21.97 )-----------( 235      ( 28 Nov 2018 12:16 )             31.4     11-28    145  |  103  |  24<H>  ----------------------------<  98  4.2   |  25  |  0.7    Ca    9.1      28 Nov 2018 12:16  Mg     2.5     11-28    TPro  6.7  /  Alb  3.4<L>  /  TBili  0.4  /  DBili  x   /  AST  61<H>  /  ALT  56<H>  /  AlkPhos  145<H>  11-28    LIVER FUNCTIONS - ( 28 Nov 2018 12:16 )  Alb: 3.4 g/dL / Pro: 6.7 g/dL / ALK PHOS: 145 U/L / ALT: 56 U/L / AST: 61 U/L / GGT: x             Culture - Urine (collected 11-26-18 @ 09:44)  Source: .Urine Clean Catch (Midstream)  Final Report (11-27-18 @ 22:50):    No growth    Culture - Blood (collected 11-24-18 @ 06:16)  Source: .Blood None  Preliminary Report (11-25-18 @ 19:01):    No growth to date.    Culture - Blood (collected 11-23-18 @ 02:40)  Source: .Blood Blood  Gram Stain (11-24-18 @ 05:13):    Growth in aerobic bottle: Gram Positive Cocci in Clusters  Final Report (11-26-18 @ 10:53):    Growth in aerobic bottle: Methicillin resistant Staphylococcus aureus    "Due to technical problems, Proteus sp. will Not be reported as part of    the BCID panel until further notice"    ***Blood Panel PCR results on this specimen are available    approximately 3 hours after the Gram stain result.***    Gram stain, PCR, and/or culture results may not always    correspond due to difference in methodologies.    ************************************************************    This PCR assay was performed using [employer].    The following targets are tested for: Enterococcus,    vancomycin resistant enterococci, Listeria monocytogenes,    coagulase negative staphylococci, S. aureus,    methicillin resistant S. aureus, Streptococcus agalactiae    (Group B), S. pneumoniae, S. pyogenes (Group A),    Acinetobacter baumannii, Enterobacter cloacae, E. coli,    Klebsiella oxytoca, K. pneumoniae, Proteus sp.,    Serratia marcescens, Haemophilus influenzae,    Neisseria meningitidis, Pseudomonas aeruginosa, Candida    albicans, C. glabrata, C krusei, C parapsilosis,    C. tropicalis and the KPC resistance gene.  Organism: Blood Culture PCR  Methicillin resistant Staphylococcus aureus (11-26-18 @ 10:53)  Organism: Methicillin resistant Staphylococcus aureus (11-26-18 @ 10:53)      -  Ampicillin/Sulbactam: R <=8/4      -  Cefazolin: R <=4      -  Clindamycin: S <=0.25      -  Daptomycin: S 0.5      -  Erythromycin: R >4      -  Gentamicin: S <=1 Should not be used as monotherapy      -  Linezolid: S 4      -  Oxacillin: R >2      -  Penicillin: R >8      -  RIF- Rifampin: S <=1 Should not be used as monotherapy      -  Tetra/Doxy: S <=1      -  Trimethoprim/Sulfamethoxazole: S <=0.5/9.5      -  Vancomycin: S 1      Method Type: SUN  Organism: Blood Culture PCR (11-26-18 @ 10:53)      -  Methicillin resistant Staphylococcus aureus (MRSA): Detec      Method Type: PCR    Culture - Blood (collected 11-23-18 @ 02:40)  Source: .Blood Blood  Final Report (11-28-18 @ 13:00):    No growth at 5 days.            RADIOLOGY & ADDITIONAL TESTS:    ANTIBIOTICS:  vancomycin  IVPB 1250 milliGRAM(s) IV Intermittent every 12 hours  vancomycin  IVPB

## 2018-11-29 NOTE — PROGRESS NOTE ADULT - ASSESSMENT
IMPRESSION:  ORSA bacteremia.  R/o endocarditis  No evidence of psoas abscess/ vertebral infection  Sacral ulcer is not infected and is not the source  WBC 21.9 which possibly is secondary to extensive DVTs/ PEs and not infectious in nature    RECOMMENDATIONS:  SAVANNAH planned   Vancomycin 1250 mg iv q12h.  Repeat a trough  REPEAT BCX DESPITE ONE BEING NEGATIVE

## 2018-11-29 NOTE — CONSULT NOTE ADULT - ASSESSMENT
IMPRESSION: Rehab of Debilitation     PRECAUTIONS: [ x   ] Cardiac  [   x ] Respiratory  [  x  ] Seizures [ x   ] Contact Isolation  [    ] Droplet Isolation  [    ] Other    Weight Bearing Status:     RECOMMENDATION:    Out of Bed to Chair     DVT/Decubiti Prophylaxis    REHAB PLAN:     [ x    ] Bedside P/T 3-5 times a week   [     ] Bedside O/T  2-3 times a week   [     ] No Rehab Therapy Indicated   [     ]  Speech Therapy   Conditioning/ROM                                 ADL  Bed Mobility                                            Conditioning/ROM  Transfers                                                  Bed Mobility  Sitting /Standing Balance                      Transfers                                        Gait Training                                            Sitting/Standing Balance  Stair Training [   ]Applicable                 Home equipment Eval                                                                     Splinting  [   ] Only      GOALS:   ADL   [    ]   Independent         Transfers  [    ] Independent            Ambulation  [     ] Independent     [ x    ] With device                            [x    ]  CG                                               [   x ]  CG                                                    [  x   ] CG                            [    ] Min A                                          [    ] Min A                                                [     ] Min  A          DISCHARGE PLAN:   [     ]  Good candidate for Intensive Rehabilitation/Hospital based                                             Will tolerate 3hrs Intensive Rehab Daily                                       [   x   ]  Short Term Rehab in Skilled Nursing Facility  ?LTC                                       [      ]  Home with Outpatient or  services                                         [      ]  Possible Candidate for Intensive Hospital based Rehab

## 2018-11-29 NOTE — CONSULT NOTE ADULT - SUBJECTIVE AND OBJECTIVE BOX
Patient is a 58y old  Male who presents with a chief complaint of sob and fall (29 Nov 2018 15:29)    HPI:  57M, PMHx of seizure disorder, DLD, presents to the ED sp fall from NH. Pt was brought in by EMS from his nursing home due to a fall and then experienced SOB. It is unclear which event happened first, either the SOB or fall. During assessment patient was is respiratory distress and hypoxic to 80s on RA and tachypneic. Pt reports b/l lateral chest pain and difficulty breathing. Pt doesn't know how he fell, denies HT/LOC. Pt was unable to provide further history due to intubation    Pt was intubated in the ED for airway protection.   In ed, he was found to have b/l pe without strain, melina with HAGMA and lactic acidosis, coffee ground emesis. (23 Nov 2018 04:26)      PAST MEDICAL & SURGICAL HISTORY:  HLD (hyperlipidemia)  HTN (hypertension)  Seizures  Unilateral inguinal hernia with obstruction and without gangrene, recurrence not specified  No significant past surgical history      Hospital Course: Acute hypoxic respiratory failure secondary to  b/l PE with no RV strain ,, status post intubation & extubation  Acute deep venous thrombosis and pulmonary embolism   MRSA bacteremia  Dysphagia  stage 2 sacral ulcer  Endocarditis needs to be ruled out   age indeterminate lacunar infarcts  dyslipidemia   hypertension     PLAN:  -SAVANNAH done but results still pending , need to r/o endocarditis  -MRI needs to be done for further evaluation for  age indeterminate lacunar infarcts,( Neurology recommended anti-coagualtion as benefits outweighs the risk but recommended MRI when stable, will het MRI brain)  - Currently on  anticoagulation  with Lovenox for PE and DVT  c/w abx (IV Vanco)   seen by speech- mech soft with nectar thicks    TODAY'S SUBJECTIVE & REVIEW OF SYMPTOMS:     Constitutional Weakness   Cardio WNL   Resp SOB   GI WNL  Heme WNL  Endo WNL  Skin WNL  MSK WNL  Neuro WNL  Cognitive WNL  Psych WNL      MEDICATIONS  (STANDING):  atorvastatin 40 milliGRAM(s) Oral at bedtime  chlorhexidine 4% Liquid 1 Application(s) Topical <User Schedule>  docusate sodium 100 milliGRAM(s) Oral two times a day  enoxaparin Injectable 100 milliGRAM(s) SubCutaneous every 12 hours  folic acid 1 milliGRAM(s) Oral daily  levETIRAcetam 1500 milliGRAM(s) Oral two times a day  pantoprazole    Tablet 40 milliGRAM(s) Oral two times a day  phenytoin   Capsule 200 milliGRAM(s) Oral four times a day  senna 2 Tablet(s) Oral at bedtime  sodium bicarbonate 650 milliGRAM(s) Oral three times a day  sodium chloride 0.9%. 1000 milliLiter(s) (75 mL/Hr) IV Continuous <Continuous>  thiamine 100 milliGRAM(s) Oral daily  vancomycin  IVPB 1250 milliGRAM(s) IV Intermittent every 12 hours  vancomycin  IVPB        MEDICATIONS  (PRN):      FAMILY HISTORY:  No pertinent family history in first degree relatives      Allergies    No Known Allergies    Intolerances    Seafood (Unknown)      SOCIAL HISTORY:    [    ] Etoh  [    ] Smoking  [    ] Substance abuse     Home Environment:  [    ] Home Alone  [    ] Lives with Family  [    ] Home Health Aid    Dwelling:  [    ] Apartment  [    ] Private House  [    ] Adult Home  [  x  ] Skilled Nursing Facility      [  x  ] Short Term  [   x ] Long Term  [    ] Stairs                           [    ] Elevator     FUNCTIONAL STATUS PTA: (Check all that apply)  Ambulation: [     ]Independent    [  x  ] Dependent     [    ] Non-Ambulatory  Assistive Device: [    ] SA Cane  [    ]  Q Cane  [    ] Walker  [    ]  Wheelchair  ADL : [    ] Independent  [   x ]  Dependent       Vital Signs Last 24 Hrs  T(C): 35.6 (29 Nov 2018 13:21), Max: 36.8 (28 Nov 2018 18:53)  T(F): 96 (29 Nov 2018 13:21), Max: 98.3 (28 Nov 2018 18:53)  HR: 118 (29 Nov 2018 13:21) (107 - 124)  BP: 108/71 (29 Nov 2018 13:21) (108/65 - 119/68)  BP(mean): 81 (28 Nov 2018 18:05) (81 - 81)  RR: 18 (29 Nov 2018 13:21) (18 - 25)  SpO2: 98% (28 Nov 2018 18:05) (98% - 98%)      PHYSICAL EXAM: Alert Confused On O2  GENERAL: NAD, well-groomed, well-developed  HEAD:  Atraumatic, Normocephalic  EYES: EOMI, PERRLA, conjunctiva and sclera clear  NECK: Supple, No JVD, Normal thyroid  CHEST/LUNG: +Rhonchi  HEART: Regular rate and rhythm; No murmurs, rubs, or gallops  ABDOMEN: Soft, Nontender, Nondistended; Bowel sounds present  EXTREMITIES:  2+ Peripheral Pulses, No clubbing, cyanosis, or edema, + stage 3 sacral decubitus    NERVOUS SYSTEM:  Cranial Nerves 2-12 intact [  x  ] Abnormal  [    ]  ROM: WFL all extremities [   x ]  Abnormal [     ]  Motor Strength: WFL all extremities  [   x ]  Abnormal [    ]  Sensation: intact to light touch [   x ] Abnormal [    ]  Reflexes: Symmetric [    ]  Abnormal [    ]    FUNCTIONAL STATUS:  Bed Mobility: [   ]  Independent [    ]  Supervision [  x  ]  Needs Assistance [  ]  N/A  Transfers: [    ]  Independent [    ]  Supervision [    ]  Needs Assistance [   x ]  N/A    Ambulation:  [    ]  Independent [    ]  Supervision [    ]  Needs Assistance [   x ]  N/A   ADL:  [    ]   Independent [x    ] Requires Assistance [    ] N/A       LABS:                        9.4    20.93 )-----------( 297      ( 29 Nov 2018 06:58 )             29.5     11-29    146  |  106  |  20  ----------------------------<  108<H>  4.4   |  23  |  0.6<L>    Ca    8.8      29 Nov 2018 06:58  Phos  3.6     11-29  Mg     2.3     11-29    TPro  6.2  /  Alb  3.1<L>  /  TBili  0.5  /  DBili  x   /  AST  86<H>  /  ALT  72<H>  /  AlkPhos  132<H>  11-29    PTT - ( 28 Nov 2018 12:16 )  PTT:31.3 sec      RADIOLOGY & ADDITIONAL STUDIES: Patient is a 58y old  Male who presents with a chief complaint of sob and fall (29 Nov 2018 15:29)    HPI:  57M, PMHx of seizure disorder, DLD, presents to the ED sp fall from NH. Pt was brought in by EMS from his nursing home due to a fall and then experienced SOB. It is unclear which event happened first, either the SOB or fall. During assessment patient was is respiratory distress and hypoxic to 80s on RA and tachypneic. Pt reports b/l lateral chest pain and difficulty breathing. Pt doesn't know how he fell, denies HT/LOC. Pt was unable to provide further history due to intubation    Pt was intubated in the ED for airway protection.   In ed, he was found to have b/l pe without strain, melina with HAGMA and lactic acidosis, coffee ground emesis. (23 Nov 2018 04:26)      PAST MEDICAL & SURGICAL HISTORY:  HLD (hyperlipidemia)  HTN (hypertension)  Seizures  Unilateral inguinal hernia with obstruction and without gangrene, recurrence not specified  No significant past surgical history      Hospital Course: Acute hypoxic respiratory failure secondary to  b/l PE with no RV strain ,, status post intubation & extubation  Acute deep venous thrombosis and pulmonary embolism   MRSA bacteremia  Dysphagia  stage 2 sacral ulcer  Endocarditis needs to be ruled out   age indeterminate lacunar infarcts  dyslipidemia   hypertension     PLAN:  -SAVANNAH done but results still pending , need to r/o endocarditis  -MRI needs to be done for further evaluation for  age indeterminate lacunar infarcts,( Neurology recommended anti-coagualtion as benefits outweighs the risk but recommended MRI when stable, will het MRI brain)  - Currently on  anticoagulation  with Lovenox for PE and DVT  c/w abx (IV Vanco)   seen by speech- mech soft with nectar thicks    TODAY'S SUBJECTIVE & REVIEW OF SYMPTOMS:     Constitutional Weakness   Cardio WNL   Resp SOB   GI WNL  Heme WNL  Endo WNL  Skin WNL  MSK WNL  Neuro WNL  Cognitive WNL  Psych WNL      MEDICATIONS  (STANDING):  atorvastatin 40 milliGRAM(s) Oral at bedtime  chlorhexidine 4% Liquid 1 Application(s) Topical <User Schedule>  docusate sodium 100 milliGRAM(s) Oral two times a day  enoxaparin Injectable 100 milliGRAM(s) SubCutaneous every 12 hours  folic acid 1 milliGRAM(s) Oral daily  levETIRAcetam 1500 milliGRAM(s) Oral two times a day  pantoprazole    Tablet 40 milliGRAM(s) Oral two times a day  phenytoin   Capsule 200 milliGRAM(s) Oral four times a day  senna 2 Tablet(s) Oral at bedtime  sodium bicarbonate 650 milliGRAM(s) Oral three times a day  sodium chloride 0.9%. 1000 milliLiter(s) (75 mL/Hr) IV Continuous <Continuous>  thiamine 100 milliGRAM(s) Oral daily  vancomycin  IVPB 1250 milliGRAM(s) IV Intermittent every 12 hours  vancomycin  IVPB        MEDICATIONS  (PRN):      FAMILY HISTORY:  No pertinent family history in first degree relatives      Allergies    No Known Allergies    Intolerances    Seafood (Unknown)      SOCIAL HISTORY:    [    ] Etoh  [    ] Smoking  [    ] Substance abuse     Home Environment:  [    ] Home Alone  [    ] Lives with Family  [    ] Home Health Aid    Dwelling:  [    ] Apartment  [    ] Private House  [    ] Adult Home  [  x  ] Skilled Nursing Facility      [  x  ] Short Term  [   x ] Long Term?  [    ] Stairs                           [    ] Elevator     FUNCTIONAL STATUS PTA: (Check all that apply)  Ambulation: [     ]Independent    [  x  ] Dependent     [    ] Non-Ambulatory  Assistive Device: [    ] SA Cane  [    ]  Q Cane  [    ] Walker  [    ]  Wheelchair  ADL : [    ] Independent  [   x ]  Dependent       Vital Signs Last 24 Hrs  T(C): 35.6 (29 Nov 2018 13:21), Max: 36.8 (28 Nov 2018 18:53)  T(F): 96 (29 Nov 2018 13:21), Max: 98.3 (28 Nov 2018 18:53)  HR: 118 (29 Nov 2018 13:21) (107 - 124)  BP: 108/71 (29 Nov 2018 13:21) (108/65 - 119/68)  BP(mean): 81 (28 Nov 2018 18:05) (81 - 81)  RR: 18 (29 Nov 2018 13:21) (18 - 25)  SpO2: 98% (28 Nov 2018 18:05) (98% - 98%)      PHYSICAL EXAM: Alert Confused On O2, follows simple commands  GENERAL: NAD, well-groomed, well-developed  HEAD:  Atraumatic, Normocephalic  EYES: EOMI, PERRLA, conjunctiva and sclera clear  NECK: Supple, No JVD, Normal thyroid  CHEST/LUNG: +Rhonchi  HEART: Regular rate and rhythm; No murmurs, rubs, or gallops  ABDOMEN: Soft, Nontender, Nondistended; Bowel sounds present  EXTREMITIES:  2+ edema BLES, + stage 3 sacral decubitus    NERVOUS SYSTEM:  Cranial Nerves 2-12 intact [  x  ] Abnormal  [    ]  ROM: WFL all extremities [   x ]  Abnormal [     ]  Motor Strength: WFL all extremities  [   x ]  Abnormal [    ]  Sensation: intact to light touch [   x ] Abnormal [    ]  Reflexes: Symmetric [    ]  Abnormal [    ]    FUNCTIONAL STATUS:  Bed Mobility: [   ]  Independent [    ]  Supervision [  x  ]  Needs Assistance [  ]  N/A  Transfers: [    ]  Independent [    ]  Supervision [    ]  Needs Assistance [   x ]  N/A    Ambulation:  [    ]  Independent [    ]  Supervision [    ]  Needs Assistance [   x ]  N/A   ADL:  [    ]   Independent [x    ] Requires Assistance [    ] N/A       LABS:                        9.4    20.93 )-----------( 297      ( 29 Nov 2018 06:58 )             29.5     11-29    146  |  106  |  20  ----------------------------<  108<H>  4.4   |  23  |  0.6<L>    Ca    8.8      29 Nov 2018 06:58  Phos  3.6     11-29  Mg     2.3     11-29    TPro  6.2  /  Alb  3.1<L>  /  TBili  0.5  /  DBili  x   /  AST  86<H>  /  ALT  72<H>  /  AlkPhos  132<H>  11-29    PTT - ( 28 Nov 2018 12:16 )  PTT:31.3 sec      RADIOLOGY & ADDITIONAL STUDIES:

## 2018-11-29 NOTE — PROGRESS NOTE ADULT - ASSESSMENT
|:::::::::::::::::::::::::::|  ASSESSMENT / PLAN |:::::::::::::::::::::::::::|    This is a 57 year old male who presented to the ED after a fall. After his fall, he had become short of breath. Upon arrival, he was found to be in respiratory distress with SpO2 in 80s, and was subsequently intubated. CT Chest w/ IV contrast showed extensive PE bilaterally. Venous duplex shows extensive bilateral thromboses. There was also an episode of coffee ground emesis (resolved). He was admitted to the CCU. In the ICU, Bcx grew MRSA; repeats negative with antibiotics.     PMHx: Seizure disorder, HTN, DLD  ===========================================================  Today:  Seen at bedside and agitated easily. He complained of right-sided chest pain today. EKG was performed and showed no changes to prior EKG.     Acute hypoxic respiratory failure; 2/2 bilateral pulmonary embolism; s/p extubation; Acute  - CT Chest w/ IV contrast shows multiple bilateral acute pulmonary emboli involving all 5 lobes   - TTE shows septal motion dysfunction and no evidence of RIGHT heart strain  - Risk for aspiration; maintain HOB @45 degrees  - As per GI, AC benefit outweighs the risk of GI bleed at this time  - Continue with Lovenox 100mg SubQ BID    MRSA bacteremia; rule out bacterial endocarditis; Acute  - MRSA + culture; now negative  - Leukocytosis likely 2/2 PE; not infectious  - Continue with Vancomycin 1250mg IV BID  - ID recommendations appreciated    Extensive DVT in BL lower extremities; Acute  - Bilateral Common femoral, Femoral, Deep femoral, Popliteal, Posterior tibial, Gastrocnemius, and Peroneal veins thrombosed  - Initiated on Heparin drip; Changed to therapeutic Lovenox     CVA; Acute or Chronic?  - Neurology recommendations appreciated  - CT head showed small infarct in the right frontal lobe and small lacunar infarcts within the basal ganglia and thalami, new since the prior exam  - MRI brain when less acute vs. inpatient?  - Continue Atorvastatin    Renal artery stenosis; Severe; Left-side; New  - Follow up with vascular surgery    Coffee ground emesis; Resolved  - GI recommendations appreciated: Started on Protnix IV; switched to PO BID  - Recommend EGD when more stable  - Follow H+H daily    Acute renal failure; Resolved  - Continue to monitor    Seizure; Chronic  - Continue with Dilantin/Keppra    Hypertension; Chronic  - Controlled as inpatient    Thiamine deficiency; Chronic  - Continue Thiamine PO 100mg daily     Stage II sacral ulcer; Chronic  - No evidence of infection  - Continue daily wound care  - Off-loading protocol if patient not moving    Electrolyte Imbalances: Correct as needed; WNL  []  Hyponatremia   /   Hypernatremia  []   []  Hypokalemia   /   Hyperkalemia  []   []  Hypochlorhydria   /    Hypochlorhydria  []   []  Hypocarbia   /   Hypercarbia  []   []  Hypomagnesemia   /   Hypermagnesemia  []   []  Hypophosphatemia   /   Hyperphosphatemia  []       GI ppx:                                   [] Not indicated   /   [X] Pantoprazole 40mg PO Daily    DVT ppx:  Therapeutic Lovenox 100mg SuBQ BID  [] Not indicated / [] Heparin 5000mg SubQ / [] Lovenox 40mg SubQ / [] SCDs    Fluids:   [X] PO  |  [] IVF    Activity:  [] Ad Bernice  /  [X] Increase as Tolerated  /  [] OOB w/ assist  /  [] Bed Rest    BMI:  Height (cm): 190.5 (11-28)  Weight (kg): 97.6 (11-27)  BMI (kg/m2): 26.9 (11-28)    DISPO:  Patient to be discharged when condition(s) optimized.    [X] Discussion with patient and/or family regarding goals of care.  [X] Discussed Case and Plan with the Medical Attending.    CODE STATUS  [X] FULL   /    [] DNR    Please call Dr. Solorio [[email or web address]] with any questions/consult recs: [facility] #[phone]

## 2018-11-29 NOTE — PROGRESS NOTE ADULT - SUBJECTIVE AND OBJECTIVE BOX
DAILY PROGRESS NOTE  ===========================================================    Patient Information:  LIN BENTON  /  58y  /  Male  /  MRN#: [id number]    Hospital Day: 6d     |:::::::::::::::::::::::::::| SUBJECTIVE |:::::::::::::::::::::::::::|    OVERNIGHT EVENTS: None    TODAY: Patient was seen today at bedside. He complained of chest pain. EKG was performed and revealed no changes from previous EKGs.    Review of systems is otherwise negative.    |:::::::::::::::::::::::::::| OBJECTIVE |:::::::::::::::::::::::::::|    VITAL SIGNS: Last 24 Hours  T(F): 98.7 (29 Nov 2018 20:00), Max: 98.7 (29 Nov 2018 20:00)  HR: 106 (29 Nov 2018 20:00) (106 - 118)  BP: 123/72 (29 Nov 2018 20:00) (108/71 - 123/72)  RR: 18 (29 Nov 2018 20:00) (18 - 18)  SpO2: --    11-28-18 @ 07:01  -  11-29-18 @ 07:00  --------------------------------------------------------  IN: 900 mL / OUT: 570 mL / NET: 330 mL    11-29-18 @ 07:01 - 11-29-18 @ 22:15  --------------------------------------------------------  IN: 585 mL / OUT: 0 mL / NET: 585 mL    PHYSICAL EXAM:  GENERAL:   Awake, alert; NAD.  HEENT:  Head NC/AT; Conjunctivae pink, Sclera anicteric & non-injected; Oral mucosa moist.  NECK:   Supple.  CARDIO:   Increased rate; Regular rhythm; S1 & S2; No M/R/G appreciated.  RESP:   No rales or rhonchi appreciated.  GI:   Soft; NT/ND; BS; No guarding; No rebound tenderness.  EXT:   No edema in UE and LE.  NEURO:   PERRL.  SKIN:   Intact.    LAB RESULTS:                          9.4    20.93 )-----------( 297      ( 29 Nov 2018 06:58 )              29.5     146  |  106  |  20  -----------------------<  108<H>  4.4   |  23  |  0.6<L>    Ca      8.8       Phos   3.6       Mg     2.3         TPro  6.2  /  Alb  3.1<L>  /  TBili  0.5  /  DBili  x   /  AST  86<H>  /  ALT  72<H>  /  AlkPhos  132<H>      PTT:31.3 sec    MICROBIOLOGY:  Negative to date    RADIOLOGY:  Pending SAVANNAH     ALLERGIES:  No Known Allergies    ===========================================================

## 2018-11-30 LAB
ALBUMIN SERPL ELPH-MCNC: 2.8 G/DL — LOW (ref 3.5–5.2)
ALBUMIN SERPL ELPH-MCNC: 2.8 G/DL — LOW (ref 3.5–5.2)
ALP SERPL-CCNC: 114 U/L — SIGNIFICANT CHANGE UP (ref 30–115)
ALP SERPL-CCNC: 121 U/L — HIGH (ref 30–115)
ALT FLD-CCNC: 77 U/L — HIGH (ref 0–41)
ALT FLD-CCNC: 86 U/L — HIGH (ref 0–41)
ANION GAP SERPL CALC-SCNC: 15 MMOL/L — HIGH (ref 7–14)
ANION GAP SERPL CALC-SCNC: 15 MMOL/L — HIGH (ref 7–14)
APCR PPP: 3.06 RATIO — SIGNIFICANT CHANGE UP
AST SERPL-CCNC: 84 U/L — HIGH (ref 0–41)
AST SERPL-CCNC: 95 U/L — HIGH (ref 0–41)
BASOPHILS # BLD AUTO: 0.03 K/UL — SIGNIFICANT CHANGE UP (ref 0–0.2)
BASOPHILS # BLD AUTO: 0.04 K/UL — SIGNIFICANT CHANGE UP (ref 0–0.2)
BASOPHILS NFR BLD AUTO: 0.2 % — SIGNIFICANT CHANGE UP (ref 0–1)
BASOPHILS NFR BLD AUTO: 0.2 % — SIGNIFICANT CHANGE UP (ref 0–1)
BILIRUB SERPL-MCNC: 0.3 MG/DL — SIGNIFICANT CHANGE UP (ref 0.2–1.2)
BILIRUB SERPL-MCNC: 0.4 MG/DL — SIGNIFICANT CHANGE UP (ref 0.2–1.2)
BUN SERPL-MCNC: 17 MG/DL — SIGNIFICANT CHANGE UP (ref 10–20)
BUN SERPL-MCNC: 17 MG/DL — SIGNIFICANT CHANGE UP (ref 10–20)
CALCIUM SERPL-MCNC: 8.4 MG/DL — LOW (ref 8.5–10.1)
CALCIUM SERPL-MCNC: 8.5 MG/DL — SIGNIFICANT CHANGE UP (ref 8.5–10.1)
CHLORIDE SERPL-SCNC: 107 MMOL/L — SIGNIFICANT CHANGE UP (ref 98–110)
CHLORIDE SERPL-SCNC: 109 MMOL/L — SIGNIFICANT CHANGE UP (ref 98–110)
CO2 SERPL-SCNC: 24 MMOL/L — SIGNIFICANT CHANGE UP (ref 17–32)
CO2 SERPL-SCNC: 24 MMOL/L — SIGNIFICANT CHANGE UP (ref 17–32)
CREAT SERPL-MCNC: 0.6 MG/DL — LOW (ref 0.7–1.5)
CREAT SERPL-MCNC: 0.6 MG/DL — LOW (ref 0.7–1.5)
EOSINOPHIL # BLD AUTO: 0.21 K/UL — SIGNIFICANT CHANGE UP (ref 0–0.7)
EOSINOPHIL # BLD AUTO: 0.4 K/UL — SIGNIFICANT CHANGE UP (ref 0–0.7)
EOSINOPHIL NFR BLD AUTO: 1.2 % — SIGNIFICANT CHANGE UP (ref 0–8)
EOSINOPHIL NFR BLD AUTO: 2.7 % — SIGNIFICANT CHANGE UP (ref 0–8)
GLUCOSE SERPL-MCNC: 117 MG/DL — HIGH (ref 70–99)
GLUCOSE SERPL-MCNC: 138 MG/DL — HIGH (ref 70–99)
HCT VFR BLD CALC: 27.3 % — LOW (ref 42–52)
HCT VFR BLD CALC: 28.4 % — LOW (ref 42–52)
HGB BLD-MCNC: 8.6 G/DL — LOW (ref 14–18)
HGB BLD-MCNC: 9 G/DL — LOW (ref 14–18)
IMM GRANULOCYTES NFR BLD AUTO: 3.9 % — HIGH (ref 0.1–0.3)
IMM GRANULOCYTES NFR BLD AUTO: 6 % — HIGH (ref 0.1–0.3)
INR BLD: 1.64 RATIO — HIGH (ref 0.65–1.3)
LYMPHOCYTES # BLD AUTO: 15 % — LOW (ref 20.5–51.1)
LYMPHOCYTES # BLD AUTO: 16.6 % — LOW (ref 20.5–51.1)
LYMPHOCYTES # BLD AUTO: 2.5 K/UL — SIGNIFICANT CHANGE UP (ref 1.2–3.4)
LYMPHOCYTES # BLD AUTO: 2.7 K/UL — SIGNIFICANT CHANGE UP (ref 1.2–3.4)
MAGNESIUM SERPL-MCNC: 2.1 MG/DL — SIGNIFICANT CHANGE UP (ref 1.8–2.4)
MAGNESIUM SERPL-MCNC: 2.2 MG/DL — SIGNIFICANT CHANGE UP (ref 1.8–2.4)
MCHC RBC-ENTMCNC: 29.3 PG — SIGNIFICANT CHANGE UP (ref 27–31)
MCHC RBC-ENTMCNC: 29.4 PG — SIGNIFICANT CHANGE UP (ref 27–31)
MCHC RBC-ENTMCNC: 31.5 G/DL — LOW (ref 32–37)
MCHC RBC-ENTMCNC: 31.7 G/DL — LOW (ref 32–37)
MCV RBC AUTO: 92.8 FL — SIGNIFICANT CHANGE UP (ref 80–94)
MCV RBC AUTO: 92.9 FL — SIGNIFICANT CHANGE UP (ref 80–94)
MONOCYTES # BLD AUTO: 1.3 K/UL — HIGH (ref 0.1–0.6)
MONOCYTES # BLD AUTO: 1.78 K/UL — HIGH (ref 0.1–0.6)
MONOCYTES NFR BLD AUTO: 8.6 % — SIGNIFICANT CHANGE UP (ref 1.7–9.3)
MONOCYTES NFR BLD AUTO: 9.9 % — HIGH (ref 1.7–9.3)
NEUTROPHILS # BLD AUTO: 10.25 K/UL — HIGH (ref 1.4–6.5)
NEUTROPHILS # BLD AUTO: 12.19 K/UL — HIGH (ref 1.4–6.5)
NEUTROPHILS NFR BLD AUTO: 67.7 % — SIGNIFICANT CHANGE UP (ref 42.2–75.2)
NEUTROPHILS NFR BLD AUTO: 68 % — SIGNIFICANT CHANGE UP (ref 42.2–75.2)
NRBC # BLD: 0 /100 WBCS — SIGNIFICANT CHANGE UP (ref 0–0)
NRBC # BLD: 0 /100 WBCS — SIGNIFICANT CHANGE UP (ref 0–0)
PHOSPHATE SERPL-MCNC: 3.4 MG/DL — SIGNIFICANT CHANGE UP (ref 2.1–4.9)
PHOSPHATE SERPL-MCNC: 3.5 MG/DL — SIGNIFICANT CHANGE UP (ref 2.1–4.9)
PLATELET # BLD AUTO: 318 K/UL — SIGNIFICANT CHANGE UP (ref 130–400)
PLATELET # BLD AUTO: 363 K/UL — SIGNIFICANT CHANGE UP (ref 130–400)
POTASSIUM SERPL-MCNC: 4.1 MMOL/L — SIGNIFICANT CHANGE UP (ref 3.5–5)
POTASSIUM SERPL-MCNC: 4.3 MMOL/L — SIGNIFICANT CHANGE UP (ref 3.5–5)
POTASSIUM SERPL-SCNC: 4.1 MMOL/L — SIGNIFICANT CHANGE UP (ref 3.5–5)
POTASSIUM SERPL-SCNC: 4.3 MMOL/L — SIGNIFICANT CHANGE UP (ref 3.5–5)
PROT SERPL-MCNC: 5.4 G/DL — LOW (ref 6–8)
PROT SERPL-MCNC: 5.7 G/DL — LOW (ref 6–8)
PROTHROM AB SERPL-ACNC: 18.8 SEC — HIGH (ref 9.95–12.87)
RBC # BLD: 2.94 M/UL — LOW (ref 4.7–6.1)
RBC # BLD: 3.06 M/UL — LOW (ref 4.7–6.1)
RBC # FLD: 14.7 % — HIGH (ref 11.5–14.5)
RBC # FLD: 14.9 % — HIGH (ref 11.5–14.5)
SODIUM SERPL-SCNC: 146 MMOL/L — SIGNIFICANT CHANGE UP (ref 135–146)
SODIUM SERPL-SCNC: 148 MMOL/L — HIGH (ref 135–146)
VANCOMYCIN TROUGH SERPL-MCNC: 11.1 UG/ML — HIGH (ref 5–10)
WBC # BLD: 15.07 K/UL — HIGH (ref 4.8–10.8)
WBC # BLD: 18 K/UL — HIGH (ref 4.8–10.8)
WBC # FLD AUTO: 15.07 K/UL — HIGH (ref 4.8–10.8)
WBC # FLD AUTO: 18 K/UL — HIGH (ref 4.8–10.8)

## 2018-11-30 RX ORDER — SODIUM CHLORIDE 9 MG/ML
1000 INJECTION, SOLUTION INTRAVENOUS
Qty: 0 | Refills: 0 | Status: DISCONTINUED | OUTPATIENT
Start: 2018-11-30 | End: 2018-12-03

## 2018-11-30 RX ADMIN — Medication 100 MILLIGRAM(S): at 11:59

## 2018-11-30 RX ADMIN — Medication 200 MILLIGRAM(S): at 11:59

## 2018-11-30 RX ADMIN — Medication 200 MILLIGRAM(S): at 17:33

## 2018-11-30 RX ADMIN — Medication 200 MILLIGRAM(S): at 05:47

## 2018-11-30 RX ADMIN — SENNA PLUS 2 TABLET(S): 8.6 TABLET ORAL at 21:56

## 2018-11-30 RX ADMIN — PANTOPRAZOLE SODIUM 40 MILLIGRAM(S): 20 TABLET, DELAYED RELEASE ORAL at 17:35

## 2018-11-30 RX ADMIN — LEVETIRACETAM 1500 MILLIGRAM(S): 250 TABLET, FILM COATED ORAL at 17:34

## 2018-11-30 RX ADMIN — Medication 650 MILLIGRAM(S): at 05:47

## 2018-11-30 RX ADMIN — Medication 100 MILLIGRAM(S): at 05:47

## 2018-11-30 RX ADMIN — Medication 1 MILLIGRAM(S): at 11:59

## 2018-11-30 RX ADMIN — Medication 166.67 MILLIGRAM(S): at 05:46

## 2018-11-30 RX ADMIN — ENOXAPARIN SODIUM 100 MILLIGRAM(S): 100 INJECTION SUBCUTANEOUS at 17:34

## 2018-11-30 RX ADMIN — Medication 200 MILLIGRAM(S): at 00:17

## 2018-11-30 RX ADMIN — CHLORHEXIDINE GLUCONATE 1 APPLICATION(S): 213 SOLUTION TOPICAL at 05:47

## 2018-11-30 RX ADMIN — LEVETIRACETAM 1500 MILLIGRAM(S): 250 TABLET, FILM COATED ORAL at 05:47

## 2018-11-30 RX ADMIN — Medication 100 MILLIGRAM(S): at 17:33

## 2018-11-30 RX ADMIN — SODIUM CHLORIDE 75 MILLILITER(S): 9 INJECTION, SOLUTION INTRAVENOUS at 11:55

## 2018-11-30 RX ADMIN — Medication 200 MILLIGRAM(S): at 23:02

## 2018-11-30 RX ADMIN — PANTOPRAZOLE SODIUM 40 MILLIGRAM(S): 20 TABLET, DELAYED RELEASE ORAL at 05:47

## 2018-11-30 NOTE — SWALLOW BEDSIDE ASSESSMENT ADULT - ORAL PHASE
Delayed oral transit time/Decreased anterior-posterior movement of the bolus Delayed oral transit time/Lingual stasis/Decreased anterior-posterior movement of the bolus/Palatal stasis

## 2018-11-30 NOTE — PROGRESS NOTE ADULT - SUBJECTIVE AND OBJECTIVE BOX
DAILY PROGRESS NOTE  ===========================================================    Patient Information:  LIN BENTON  /  58y  /  Male  /  MRN#: [id number]    Hospital Day: 7d    |:::::::::::::::::::::::::::| SUBJECTIVE |:::::::::::::::::::::::::::|    OVERNIGHT EVENTS: None    TODAY: Patient was seen today at bedside. He complained of chest pain. EKG was performed and revealed no changes from previous EKGs.    Review of systems is otherwise negative.    |:::::::::::::::::::::::::::| OBJECTIVE |:::::::::::::::::::::::::::|    VITAL SIGNS: Last 24 Hours  T(F): 95.7 (30 Nov 2018 14:16), Max: 98.7 (29 Nov 2018 20:00)  HR: 115 (30 Nov 2018 14:16) (104 - 115)  BP: 112/63 (30 Nov 2018 14:16) (112/63 - 123/72)  RR: 18 (30 Nov 2018 14:16) (18 - 19)  SpO2: --    PHYSICAL EXAM:  GENERAL:   Awake, alert; NAD.  HEENT:  Head NC/AT; Conjunctivae pink, Sclera anicteric & non-injected; Oral mucosa moist.  NECK:   Supple.  CARDIO:   Increased rate; Regular rhythm; S1 & S2; No M/R/G appreciated.  RESP:   No rales or rhonchi appreciated.  GI:   Soft; NT/ND; BS; No guarding; No rebound tenderness.  EXT:   No edema in UE and LE.  NEURO:   PERRL.  SKIN:   Intact.    LAB RESULTS:                       9.0<L>  18.00<H> >----------< 318                    28.4<L>    MCV: 92.8  MCHC: 31.7<L>  RDW:  14.9<H>    148<H>  |  109  |  17             --------------------------< 117<H>     4.1  |  24  | 0.6<L>    eGFR AA: 127  eGFR N-AA: 109    Calcium: 8.5  Phosphorus: 3.5  Magnesium: 2.2    AST: 84<H>    ALT: 77<H>  AlkPhos: 121<H>  Protein: 5.7<L>  Albumin: 2.8<L>  TBili: 0.4  D-Bili: --             MICROBIOLOGY:  Negative to date    RADIOLOGY:  Pending SAVANNAH Monday    ALLERGIES:  No Known Allergies    ===========================================================

## 2018-11-30 NOTE — PROGRESS NOTE ADULT - ASSESSMENT
|:::::::::::::::::::::::::::|  ASSESSMENT / PLAN |:::::::::::::::::::::::::::|    This is a 57 year old male who presented to the ED after a fall. After his fall, he had become short of breath. Upon arrival, he was found to be in respiratory distress with SpO2 in 80s, and was subsequently intubated. CT Chest w/ IV contrast showed extensive PE bilaterally. Venous duplex shows extensive bilateral thromboses. There was also an episode of coffee ground emesis (resolved). He was admitted to the CCU. In the ICU, Bcx grew MRSA; repeats negative with antibiotics.     PMHx: Seizure disorder, HTN, DLD  ===========================================================  Today:  Seen at bedside and continues to be agitated easily. He is also not allowing phlebotomy to do labs draws. Labs rescheduled to 4PM for routine, Bcx, and Vancomycin trough. Additionally, Dr. Venegas was spoken to by myself and the SAVANNAH will be completed on Monday 12/3/2018.    Acute hypoxic respiratory failure; 2/2 bilateral pulmonary embolism; s/p extubation; Acute  - CT Chest w/ IV contrast shows multiple bilateral acute pulmonary emboli involving all 5 lobes   - TTE shows septal motion dysfunction and no evidence of RIGHT heart strain  - Risk for aspiration; maintain HOB @45 degrees  - As per GI, AC benefit outweighs the risk of GI bleed at this time  - Continue with Lovenox 100mg SubQ BID    MRSA bacteremia; rule out bacterial endocarditis; Acute  - MRSA + culture; now negative; Repeat cultures/Trough at 4PM pending draw if patient allows  - Leukocytosis likely 2/2 PE; not infectious  - Continue with Vancomycin 1250mg IV BID  - ID recommendations appreciated    Extensive DVT in BL lower extremities; Acute  - Bilateral Common femoral, Femoral, Deep femoral, Popliteal, Posterior tibial, Gastrocnemius, and Peroneal veins thrombosed  - Initiated on Heparin drip; Changed to therapeutic Lovenox     CVA; Acute or Chronic?  - Neurology recommendations appreciated  - CT head showed small infarct in the right frontal lobe and small lacunar infarcts within the basal ganglia and thalami, new since the prior exam  - MRI brain when less acute vs. inpatient?  - Continue Atorvastatin    Renal artery stenosis; Severe; Left-side; New  - Follow up with vascular surgery    Coffee ground emesis; Resolved  - GI recommendations appreciated: Started on Protnix IV; switched to PO BID  - Recommend EGD when more stable  - Follow H+H daily    Acute renal failure; Resolved  - Continue to monitor    Seizure; Chronic  - Continue with Dilantin/Keppra    Hypertension; Chronic  - Controlled as inpatient    Thiamine deficiency; Chronic  - Continue Thiamine PO 100mg daily     Stage II sacral ulcer; Chronic  - No evidence of infection  - Continue daily wound care  - Off-loading protocol if patient not moving    Electrolyte Imbalances: Correct as needed; Discontinued sodium bicarb tabs  []  Hyponatremia   /   Hypernatremia  [X]   []  Hypokalemia   /   Hyperkalemia  []   []  Hypochlorhydria   /    Hypochlorhydria  []   []  Hypocarbia   /   Hypercarbia  []   []  Hypomagnesemia   /   Hypermagnesemia  []   []  Hypophosphatemia   /   Hyperphosphatemia  []       GI ppx:                                   [] Not indicated   /   [X] Pantoprazole 40mg PO Daily    DVT ppx:  Therapeutic Lovenox 100mg SuBQ BID  [] Not indicated / [] Heparin 5000mg SubQ / [] Lovenox 40mg SubQ / [] SCDs    Fluids:   [] PO  |  [X] IVF    Activity:  [] Ad Bernice  /  [X] Increase as Tolerated  /  [] OOB w/ assist  /  [] Bed Rest    BMI:  Height (cm): 190.5 (11-28)  Weight (kg): 97.6 (11-27)  BMI (kg/m2): 26.9 (11-28)    DISPO:  Patient to be discharged when condition(s) optimized.    [X] Discussion with patient and/or family regarding goals of care.  [X] Discussed Case and Plan with the Medical Attending.    CODE STATUS  [X] FULL   /    [] DNR    Please call Dr. Solorio [[email or web address]] with any questions/consult recs: Spectra #[phone] |:::::::::::::::::::::::::::|  ASSESSMENT / PLAN |:::::::::::::::::::::::::::|    This is a 57 year old male who presented to the ED after a fall. After his fall, he had become short of breath. Upon arrival, he was found to be in respiratory distress with SpO2 in 80s, and was subsequently intubated. CT Chest w/ IV contrast showed extensive PE bilaterally. Venous duplex shows extensive bilateral thromboses. There was also an episode of coffee ground emesis (resolved). He was admitted to the CCU. In the ICU, Bcx grew MRSA; repeats negative with antibiotics.     PMHx: Seizure disorder, HTN, DLD  ===========================================================  Today:  Seen at bedside and continues to be agitated easily. He is also not allowing phlebotomy to do labs draws. Labs rescheduled to 4PM for routine, Bcx, and Vancomycin trough. Additionally, Dr. Venegas was spoken to by myself and the SAVANNAH will be completed on Monday 12/3/2018.    Acute hypoxic respiratory failure; 2/2 bilateral pulmonary embolism; s/p extubation; Acute  - CT Chest w/ IV contrast shows multiple bilateral acute pulmonary emboli involving all 5 lobes   - TTE shows septal motion dysfunction and no evidence of RIGHT heart strain  - Risk for aspiration; maintain HOB @45 degrees  - As per GI, AC benefit outweighs the risk of GI bleed at this time  - Continue with Lovenox 100mg SubQ BID    MRSA bacteremia; rule out bacterial endocarditis; Acute  - MRSA + culture; now negative; Repeat cultures/Trough at 4PM pending draw if patient allows  - Leukocytosis likely 2/2 PE; not infectious  - Continue with Vancomycin 1250mg IV BID  - ID recommendations appreciated    Extensive DVT in BL lower extremities; Acute  - Bilateral Common femoral, Femoral, Deep femoral, Popliteal, Posterior tibial, Gastrocnemius, and Peroneal veins thrombosed  - Initiated on Heparin drip; Changed to therapeutic Lovenox     CVA; Acute or Chronic?  - Neurology recommendations appreciated  - CT head showed small infarct in the right frontal lobe and small lacunar infarcts within the basal ganglia and thalami, new since the prior exam  - MRI brain when less acute vs. inpatient?  - hold atorvastatin   -SAVANNAH by cardiology likely monday     Renal artery stenosis; Severe; Left-side; New  - Follow up with vascular surgery    Coffee ground emesis; Resolved  - GI recommendations appreciated: Started on Protnix IV; switched to PO BID  - Recommend EGD when more stable  - Follow H+H daily    Acute renal failure; Resolved  - Continue to monitor    Seizure; Chronic  - Continue with Dilantin/Keppra    Hypertension; Chronic  - Controlled as inpatient    Thiamine deficiency; Chronic  - Continue Thiamine PO 100mg daily     Stage II sacral ulcer; Chronic  - No evidence of infection  - Continue daily wound care  - Off-loading protocol if patient not moving    Transaminitis :   hold lipitor and monitor     Hypernatremia  148   encourage po free water     Electrolyte Imbalances: Correct as needed; Discontinued sodium bicarb tabs  []  Hyponatremia   /   Hypernatremia  [X]   []  Hypokalemia   /   Hyperkalemia  []   []  Hypochlorhydria   /    Hypochlorhydria  []   []  Hypocarbia   /   Hypercarbia  []   []  Hypomagnesemia   /   Hypermagnesemia  []   []  Hypophosphatemia   /   Hyperphosphatemia  []       GI ppx:                                   [] Not indicated   /   [X] Pantoprazole 40mg PO Daily    DVT ppx:  Therapeutic Lovenox 100mg SuBQ BID  [] Not indicated / [] Heparin 5000mg SubQ / [] Lovenox 40mg SubQ / [] SCDs    Fluids:   [] PO  |  [X] IVF    Activity:  [] Ad Bernice  /  [X] Increase as Tolerated  /  [] OOB w/ assist  /  [] Bed Rest    BMI:  Height (cm): 190.5 (11-28)  Weight (kg): 97.6 (11-27)  BMI (kg/m2): 26.9 (11-28)    DISPO:  Patient to be discharged when condition(s) optimized.    [X] Discussion with patient and/or family regarding goals of care.  [X] Discussed Case and Plan with the Medical Attending.    CODE STATUS  [X] FULL   /    [] DNR    Please call Dr. Solorio [[email or web address]] with any questions/consult recs: Spectra #[phone]

## 2018-11-30 NOTE — PROGRESS NOTE ADULT - ASSESSMENT
IMPRESSION:  ORSA bacteremia.  R/o endocarditis  No evidence of psoas abscess/ vertebral infection  Sacral ulcer is not infected and is not the source  WBC 18.    RECOMMENDATIONS:  SAVANNAH planned   Vancomycin 1250 mg iv q12h.  VANCOMYCIN TROUGH TODAY  REPEAT BCX DESPITE ONE BEING NEGATIVE

## 2018-11-30 NOTE — PROGRESS NOTE ADULT - SUBJECTIVE AND OBJECTIVE BOX
LIN BENTON  58y, Male      OVERNIGHT EVENTS:    no fevers, no complaints    VITALS:  T(F): 97, Max: 98.7 (11-29-18 @ 20:00)  HR: 104  BP: 112/63  RR: 19Vital Signs Last 24 Hrs  T(C): 36.1 (30 Nov 2018 06:03), Max: 37.1 (29 Nov 2018 20:00)  T(F): 97 (30 Nov 2018 06:03), Max: 98.7 (29 Nov 2018 20:00)  HR: 104 (30 Nov 2018 06:03) (104 - 118)  BP: 112/63 (30 Nov 2018 06:03) (108/71 - 123/72)  BP(mean): --  RR: 19 (30 Nov 2018 06:03) (18 - 19)  SpO2: --    TESTS & MEASUREMENTS:                        9.0    18.00 )-----------( 318      ( 30 Nov 2018 00:23 )             28.4     11-30    148<H>  |  109  |  17  ----------------------------<  117<H>  4.1   |  24  |  0.6<L>    Ca    8.5      30 Nov 2018 00:23  Phos  3.5     11-30  Mg     2.2     11-30    TPro  5.7<L>  /  Alb  2.8<L>  /  TBili  0.4  /  DBili  x   /  AST  84<H>  /  ALT  77<H>  /  AlkPhos  121<H>  11-30    LIVER FUNCTIONS - ( 30 Nov 2018 00:23 )  Alb: 2.8 g/dL / Pro: 5.7 g/dL / ALK PHOS: 121 U/L / ALT: 77 U/L / AST: 84 U/L / GGT: x             Culture - Urine (collected 11-26-18 @ 09:44)  Source: .Urine Clean Catch (Midstream)  Final Report (11-27-18 @ 22:50):    No growth    Culture - Blood (collected 11-24-18 @ 06:16)  Source: .Blood None  Final Report (11-29-18 @ 19:01):    No growth at 5 days.            RADIOLOGY & ADDITIONAL TESTS:    ANTIBIOTICS:  vancomycin  IVPB 1250 milliGRAM(s) IV Intermittent every 12 hours  vancomycin  IVPB

## 2018-12-01 LAB — PROT S FREE PPP-ACNC: 51 % NORMAL — LOW (ref 70–150)

## 2018-12-01 RX ADMIN — Medication 200 MILLIGRAM(S): at 23:27

## 2018-12-01 RX ADMIN — SODIUM CHLORIDE 75 MILLILITER(S): 9 INJECTION, SOLUTION INTRAVENOUS at 23:32

## 2018-12-01 RX ADMIN — SODIUM CHLORIDE 75 MILLILITER(S): 9 INJECTION, SOLUTION INTRAVENOUS at 00:09

## 2018-12-01 RX ADMIN — Medication 100 MILLIGRAM(S): at 18:18

## 2018-12-01 RX ADMIN — SENNA PLUS 2 TABLET(S): 8.6 TABLET ORAL at 23:27

## 2018-12-01 RX ADMIN — LEVETIRACETAM 1500 MILLIGRAM(S): 250 TABLET, FILM COATED ORAL at 05:43

## 2018-12-01 RX ADMIN — PANTOPRAZOLE SODIUM 40 MILLIGRAM(S): 20 TABLET, DELAYED RELEASE ORAL at 18:18

## 2018-12-01 RX ADMIN — CHLORHEXIDINE GLUCONATE 1 APPLICATION(S): 213 SOLUTION TOPICAL at 05:44

## 2018-12-01 RX ADMIN — Medication 200 MILLIGRAM(S): at 12:59

## 2018-12-01 RX ADMIN — Medication 100 MILLIGRAM(S): at 05:43

## 2018-12-01 RX ADMIN — ENOXAPARIN SODIUM 100 MILLIGRAM(S): 100 INJECTION SUBCUTANEOUS at 18:18

## 2018-12-01 RX ADMIN — Medication 200 MILLIGRAM(S): at 05:43

## 2018-12-01 RX ADMIN — Medication 166.67 MILLIGRAM(S): at 05:43

## 2018-12-01 RX ADMIN — LEVETIRACETAM 1500 MILLIGRAM(S): 250 TABLET, FILM COATED ORAL at 18:18

## 2018-12-01 RX ADMIN — ENOXAPARIN SODIUM 100 MILLIGRAM(S): 100 INJECTION SUBCUTANEOUS at 05:44

## 2018-12-01 RX ADMIN — Medication 100 MILLIGRAM(S): at 12:59

## 2018-12-01 RX ADMIN — PANTOPRAZOLE SODIUM 40 MILLIGRAM(S): 20 TABLET, DELAYED RELEASE ORAL at 05:43

## 2018-12-01 RX ADMIN — Medication 200 MILLIGRAM(S): at 18:18

## 2018-12-01 RX ADMIN — Medication 1 MILLIGRAM(S): at 12:59

## 2018-12-01 RX ADMIN — Medication 166.67 MILLIGRAM(S): at 18:32

## 2018-12-01 NOTE — PROGRESS NOTE ADULT - SUBJECTIVE AND OBJECTIVE BOX
DAILY PROGRESS NOTE  ===========================================================    Patient Information:  LIN BENTON  /  58y  /  Male  /  MRN#: [id number]    Hospital Day: 8d    |:::::::::::::::::::::::::::| SUBJECTIVE |:::::::::::::::::::::::::::|    OVERNIGHT EVENTS: None    TODAY: Patient was seen today at bedside.    Review of systems is otherwise negative.    |:::::::::::::::::::::::::::| OBJECTIVE |:::::::::::::::::::::::::::|    VITAL SIGNS: Last 24 Hours  T(F): 95.7 (30 Nov 2018 14:16), Max: 98.7 (29 Nov 2018 20:00)  HR: 115 (30 Nov 2018 14:16) (104 - 115)  BP: 112/63 (30 Nov 2018 14:16) (112/63 - 123/72)  RR: 18 (30 Nov 2018 14:16) (18 - 19)  SpO2: --    PHYSICAL EXAM:  GENERAL:   Awake, alert; NAD.  HEENT:  Head NC/AT; Conjunctivae pink, Sclera anicteric & non-injected; Oral mucosa moist.  NECK:   Supple.  CARDIO:   Increased rate; Regular rhythm; S1 & S2; No M/R/G appreciated.  RESP:   No rales or rhonchi appreciated.  GI:   Soft; NT/ND; BS; No guarding; No rebound tenderness.  EXT:   No edema in UE and LE.  NEURO:   PERRL.  SKIN:   Intact.    LAB RESULTS:               MICROBIOLOGY:  Negative to date    RADIOLOGY:  Pending SAVANNAH Monday    ALLERGIES:  No Known Allergies    =========================================================== DAILY PROGRESS NOTE  ===========================================================    Patient Information:  LIN BENTON  /  58y  /  Male  /  MRN#: [id number]    Hospital Day: 8d    |:::::::::::::::::::::::::::| SUBJECTIVE |:::::::::::::::::::::::::::|    OVERNIGHT EVENTS: None    TODAY: Patient was seen today at bedside.    Review of systems is otherwise negative.    |:::::::::::::::::::::::::::| OBJECTIVE |:::::::::::::::::::::::::::|    VITAL SIGNS: Last 24 Hours  T(F): 95.7 (30 Nov 2018 14:16), Max: 98.7 (29 Nov 2018 20:00)  HR: 115 (30 Nov 2018 14:16) (104 - 115)  BP: 112/63 (30 Nov 2018 14:16) (112/63 - 123/72)  RR: 18 (30 Nov 2018 14:16) (18 - 19)  SpO2: --    PHYSICAL EXAM:  GENERAL:   Awake, alert.  HEENT:  Head NC/AT; Conjunctivae pink, Sclera anicteric & non-injected; Oral mucosa moist.  NECK:   Supple.  CARDIO:   Increased rate; S1 & S2; No M/R/G appreciated.  RESP:   Decreased; No rales or rhonchi appreciated.  GI:   Soft; NT/ND; BS; No guarding; No rebound tenderness.  EXT:   LE edema.  NEURO:   PERRL.  SKIN:   Intact.    LAB RESULTS:  Refusing labs at this time.                     MICROBIOLOGY:  Negative to date    RADIOLOGY:  Pending SAVANNAH Monday    ALLERGIES:  No Known Allergies    ===========================================================

## 2018-12-01 NOTE — PROGRESS NOTE ADULT - ATTENDING COMMENTS
Bacteremia, leukocytosis, PE   Functional decline.   Need PT   Lives Alone  No family around.   Need social support.

## 2018-12-01 NOTE — PROGRESS NOTE ADULT - ASSESSMENT
|:::::::::::::::::::::::::::|  ASSESSMENT / PLAN |:::::::::::::::::::::::::::|    This is a 57 year old male who presented to the ED after a fall. After his fall, he had become short of breath. Upon arrival, he was found to be in respiratory distress with SpO2 in 80s, and was subsequently intubated. CT Chest w/ IV contrast showed extensive PE bilaterally. Venous duplex shows extensive bilateral thromboses. There was also an episode of coffee ground emesis (resolved). He was admitted to the CCU. In the ICU, Bcx grew MRSA; repeats negative with antibiotics.     PMHx: Seizure disorder, HTN, DLD  ===========================================================  Today:      Acute hypoxic respiratory failure; 2/2 bilateral pulmonary embolism; s/p extubation; Acute  - CT Chest w/ IV contrast shows multiple bilateral acute pulmonary emboli involving all 5 lobes   - TTE shows septal motion dysfunction and no evidence of RIGHT heart strain  - Risk for aspiration; maintain HOB @45 degrees  - As per GI, AC benefit outweighs the risk of GI bleed at this time  - Continue with Lovenox 100mg SubQ BID    MRSA bacteremia; rule out bacterial endocarditis; Acute  - MRSA + culture; now negative; Repeat cultures/Trough at 4PM pending draw if patient allows  - Leukocytosis likely 2/2 PE; not infectious  - Continue with Vancomycin 1250mg IV BID  - ID recommendations appreciated    Extensive DVT in BL lower extremities; Acute  - Bilateral Common femoral, Femoral, Deep femoral, Popliteal, Posterior tibial, Gastrocnemius, and Peroneal veins thrombosed  - Initiated on Heparin drip; Changed to therapeutic Lovenox     CVA; Acute or Chronic?  - Neurology recommendations appreciated  - CT head showed small infarct in the right frontal lobe and small lacunar infarcts within the basal ganglia and thalami, new since the prior exam  - MRI brain when less acute vs. inpatient?  - hold atorvastatin   -SAVANNAH by cardiology likely monday     Renal artery stenosis; Severe; Left-side; New  - Follow up with vascular surgery    Coffee ground emesis; Resolved  - GI recommendations appreciated: Started on Protnix IV; switched to PO BID  - Recommend EGD when more stable  - Follow H+H daily    Acute renal failure; Resolved  - Continue to monitor    Seizure; Chronic  - Continue with Dilantin/Keppra    Hypertension; Chronic  - Controlled as inpatient    Thiamine deficiency; Chronic  - Continue Thiamine PO 100mg daily     Stage II sacral ulcer; Chronic  - No evidence of infection  - Continue daily wound care  - Off-loading protocol if patient not moving    Transaminitis :   hold lipitor and monitor     Hypernatremia  148   encourage po free water     Electrolyte Imbalances: Correct as needed; Discontinued sodium bicarb tabs  []  Hyponatremia   /   Hypernatremia  [X]   []  Hypokalemia   /   Hyperkalemia  []   []  Hypochlorhydria   /    Hypochlorhydria  []   []  Hypocarbia   /   Hypercarbia  []   []  Hypomagnesemia   /   Hypermagnesemia  []   []  Hypophosphatemia   /   Hyperphosphatemia  []       GI ppx:                                   [] Not indicated   /   [X] Pantoprazole 40mg PO Daily    DVT ppx:  Therapeutic Lovenox 100mg SuBQ BID  [] Not indicated / [] Heparin 5000mg SubQ / [] Lovenox 40mg SubQ / [] SCDs    Fluids:   [] PO  |  [X] IVF    Activity:  [] Ad Bernice  /  [X] Increase as Tolerated  /  [] OOB w/ assist  /  [] Bed Rest    BMI:  Height (cm): 190.5 (11-28)  Weight (kg): 97.6 (11-27)  BMI (kg/m2): 26.9 (11-28)    DISPO:  Patient to be discharged when condition(s) optimized.    [X] Discussion with patient and/or family regarding goals of care.  [X] Discussed Case and Plan with the Medical Attending.    CODE STATUS  [X] FULL   /    [] DNR    Please call Dr. Solorio [[email or web address]] with any questions/consult recs: Spectra #[phone] |:::::::::::::::::::::::::::|  ASSESSMENT / PLAN |:::::::::::::::::::::::::::|    This is a 57 year old male who presented to the ED after a fall. After his fall, he had become short of breath. Upon arrival, he was found to be in respiratory distress with SpO2 in 80s, and was subsequently intubated. CT Chest w/ IV contrast showed extensive PE bilaterally. Venous duplex shows extensive bilateral thromboses. There was also an episode of coffee ground emesis (resolved). He was admitted to the CCU. In the ICU, Bcx grew MRSA; repeats negative with antibiotics.     PMHx: Seizure disorder, HTN, DLD  ===========================================================  Today:  No changes from yesterday. The patient continues to refuse lab draws. Attempting to draw blood. Repeat cultures drawn on 11/30. He is scheduled for SAVANNAH Monday with Dr. Venegas.     Acute hypoxic respiratory failure; 2/2 bilateral pulmonary embolism; s/p extubation; Acute  - CT Chest w/ IV contrast shows multiple bilateral acute pulmonary emboli involving all 5 lobes   - TTE shows septal motion dysfunction and no evidence of RIGHT heart strain  - Risk for aspiration; maintain HOB @45 degrees  - As per GI, AC benefit outweighs the risk of GI bleed at this time  - Continue with Lovenox 100mg SubQ BID    MRSA bacteremia; rule out bacterial endocarditis; Acute  - MRSA + culture; now negative;   - Leukocytosis likely 2/2 PE; not infectious  - Continue with Vancomycin 1250mg IV BID  - Trough noted; Follow up ID recommendations today; New trough at 4pm  - Follow up repeat culture results  - ID recommendations appreciated    Extensive DVT in BL lower extremities; Acute  - Bilateral Common femoral, Femoral, Deep femoral, Popliteal, Posterior tibial, Gastrocnemius, and Peroneal veins thrombosed  - Initiated on Heparin drip; Changed to therapeutic Lovenox     CVA; Acute or Chronic?  - Neurology recommendations appreciated  - CT head showed small infarct in the right frontal lobe and small lacunar infarcts within the basal ganglia and thalami, new since the prior exam  - MRI brain when less acute vs. inpatient?  - Hold atorvastatin giana too increasing LFT    Renal artery stenosis; Severe; Left-side; New  - Follow up with vascular surgery    Coffee ground emesis; Resolved  - GI recommendations appreciated: Started on Protnix IV; switched to PO BID  - Recommend EGD when more stable  - Follow H+H daily    Acute renal failure; Resolved  - Continue to monitor    Seizure; Chronic  - Continue with Dilantin/Keppra    Hypertension; Chronic  - Controlled as inpatient    Thiamine deficiency; Chronic  - Continue Thiamine PO 100mg daily     Stage II sacral ulcer; Chronic  - No evidence of infection  - Continue daily wound care  - Off-loading protocol if patient not moving    Transaminitis :   hold lipitor and monitor     Hypernatremia  148   encourage po free water     Electrolyte Imbalances: Correct as needed  []  Hyponatremia   /   Hypernatremia  []   []  Hypokalemia   /   Hyperkalemia  []   []  Hypochlorhydria   /    Hypochlorhydria  []   []  Hypocarbia   /   Hypercarbia  []   []  Hypomagnesemia   /   Hypermagnesemia  []   []  Hypophosphatemia   /   Hyperphosphatemia  []       GI ppx:                                   [] Not indicated   /   [X] Pantoprazole 40mg PO Daily    DVT ppx:  Therapeutic Lovenox 100mg SuBQ BID  [] Not indicated / [] Heparin 5000mg SubQ / [] Lovenox 40mg SubQ / [] SCDs    Fluids:   [] PO  |  [X] IVF    Activity:  [] Ad Bernice  /  [X] Increase as Tolerated  /  [] OOB w/ assist  /  [] Bed Rest    BMI:  Height (cm): 190.5 (11-28)  Weight (kg): 97.6 (11-27)  BMI (kg/m2): 26.9 (11-28)    DISPO:  Patient to be discharged when condition(s) optimized.    [X] Discussion with patient and/or family regarding goals of care.  [X] Discussed Case and Plan with the Medical Attending.    CODE STATUS  [X] FULL   /    [] DNR    Please call Dr. Solorio [[email or web address]-1] with any questions/consult recs: Spectra #[phone]

## 2018-12-02 LAB — VANCOMYCIN TROUGH SERPL-MCNC: 6.8 UG/ML — SIGNIFICANT CHANGE UP (ref 5–10)

## 2018-12-02 RX ADMIN — PANTOPRAZOLE SODIUM 40 MILLIGRAM(S): 20 TABLET, DELAYED RELEASE ORAL at 06:04

## 2018-12-02 RX ADMIN — CHLORHEXIDINE GLUCONATE 1 APPLICATION(S): 213 SOLUTION TOPICAL at 06:04

## 2018-12-02 RX ADMIN — ENOXAPARIN SODIUM 100 MILLIGRAM(S): 100 INJECTION SUBCUTANEOUS at 06:05

## 2018-12-02 RX ADMIN — LEVETIRACETAM 1500 MILLIGRAM(S): 250 TABLET, FILM COATED ORAL at 06:04

## 2018-12-02 RX ADMIN — Medication 1 MILLIGRAM(S): at 12:12

## 2018-12-02 RX ADMIN — LEVETIRACETAM 1500 MILLIGRAM(S): 250 TABLET, FILM COATED ORAL at 17:31

## 2018-12-02 RX ADMIN — Medication 100 MILLIGRAM(S): at 12:13

## 2018-12-02 RX ADMIN — SENNA PLUS 2 TABLET(S): 8.6 TABLET ORAL at 22:00

## 2018-12-02 RX ADMIN — ENOXAPARIN SODIUM 100 MILLIGRAM(S): 100 INJECTION SUBCUTANEOUS at 17:31

## 2018-12-02 RX ADMIN — Medication 200 MILLIGRAM(S): at 06:05

## 2018-12-02 RX ADMIN — Medication 166.67 MILLIGRAM(S): at 17:31

## 2018-12-02 RX ADMIN — Medication 100 MILLIGRAM(S): at 17:31

## 2018-12-02 RX ADMIN — Medication 200 MILLIGRAM(S): at 12:13

## 2018-12-02 RX ADMIN — PANTOPRAZOLE SODIUM 40 MILLIGRAM(S): 20 TABLET, DELAYED RELEASE ORAL at 17:31

## 2018-12-02 RX ADMIN — Medication 200 MILLIGRAM(S): at 17:31

## 2018-12-02 RX ADMIN — Medication 100 MILLIGRAM(S): at 06:04

## 2018-12-02 RX ADMIN — SODIUM CHLORIDE 75 MILLILITER(S): 9 INJECTION, SOLUTION INTRAVENOUS at 06:04

## 2018-12-02 NOTE — PROGRESS NOTE ADULT - SUBJECTIVE AND OBJECTIVE BOX
DAILY PROGRESS NOTE  ===========================================================    Patient Information:  LIN BENTON  /  58y  /  Male  /  MRN#: [id number]    Hospital Day: 8d    |:::::::::::::::::::::::::::| SUBJECTIVE |:::::::::::::::::::::::::::|    OVERNIGHT EVENTS: None    TODAY: Patient was seen today at bedside.    Review of systems is otherwise negative.    |:::::::::::::::::::::::::::| OBJECTIVE |:::::::::::::::::::::::::::|    Vital Signs Last 24 Hrs  T(C): 36.2 (02 Dec 2018 05:58), Max: 37.1 (01 Dec 2018 21:18)  T(F): 97.2 (02 Dec 2018 05:58), Max: 98.7 (01 Dec 2018 21:18)  HR: 96 (02 Dec 2018 05:58) (96 - 105)  BP: 100/66 (02 Dec 2018 05:58) (100/66 - 145/77)  RR: 18 (02 Dec 2018 05:58) (18 - 18)  SpO2: 95% (02 Dec 2018 08:05) (95% - 97%)    PHYSICAL EXAM:  GENERAL:   Awake, alert.  HEENT:  Head NC/AT; Conjunctivae pink, Sclera anicteric & non-injected; Oral mucosa moist.  NECK:   Supple.  CARDIO:   Increased rate; S1 & S2; No M/R/G appreciated.  RESP:   Decreased; No rales or rhonchi appreciated.  GI:   Soft; NT/ND; BS; No guarding; No rebound tenderness.  EXT:   LE edema.  NEURO:   PERRL.  SKIN:   Intact.    LAB RESULTS:  Refusing labs at this time.                     MICROBIOLOGY:  Negative to date    RADIOLOGY:  Pending SAVANNAH Monday    ALLERGIES:  No Known Allergies    ===========================================================

## 2018-12-02 NOTE — PROGRESS NOTE ADULT - ASSESSMENT
|:::::::::::::::::::::::::::|  ASSESSMENT / PLAN |:::::::::::::::::::::::::::|    This is a 57 year old male who presented to the ED after a fall. After his fall, he had become short of breath. Upon arrival, he was found to be in respiratory distress with SpO2 in 80s, and was subsequently intubated. CT Chest w/ IV contrast showed extensive PE bilaterally. Venous duplex shows extensive bilateral thromboses. There was also an episode of coffee ground emesis (resolved). He was admitted to the CCU. In the ICU, Bcx grew MRSA; repeats negative with antibiotics.     PMHx: Seizure disorder, HTN, DLD  ===========================================================  Today:  No changes from yesterday. The patient continues to refuse lab draws. Attempting to draw blood. Repeat cultures drawn on 11/30. He is scheduled for SAVANNAH Monday with Dr. Venegas.     Acute hypoxic respiratory failure; 2/2 bilateral pulmonary embolism; s/p extubation; Acute  - CT Chest w/ IV contrast shows multiple bilateral acute pulmonary emboli involving all 5 lobes   - TTE shows septal motion dysfunction and no evidence of RIGHT heart strain  - Risk for aspiration; maintain HOB @45 degrees  - As per GI, AC benefit outweighs the risk of GI bleed at this time  - Continue with Lovenox 100mg SubQ BID    MRSA bacteremia; rule out bacterial endocarditis; Acute  - MRSA + culture; now negative;   - Leukocytosis likely 2/2 PE; not infectious  - Continue with Vancomycin 1250mg IV BID  - Trough noted; Follow up ID recommendations today; New trough at 4pm  - Follow up repeat culture results  - ID recommendations appreciated    Extensive DVT in BL lower extremities; Acute  - Bilateral Common femoral, Femoral, Deep femoral, Popliteal, Posterior tibial, Gastrocnemius, and Peroneal veins thrombosed  - Initiated on Heparin drip; Changed to therapeutic Lovenox     CVA; Acute or Chronic?  - Neurology recommendations appreciated  - CT head showed small infarct in the right frontal lobe and small lacunar infarcts within the basal ganglia and thalami, new since the prior exam  - MRI brain when less acute vs. inpatient?  - Hold atorvastatin giana too increasing LFT    Renal artery stenosis; Severe; Left-side; New  - Follow up with vascular surgery    Coffee ground emesis; Resolved  - GI recommendations appreciated: Started on Protnix IV; switched to PO BID  - Recommend EGD when more stable  - Follow H+H daily    Acute renal failure; Resolved  - Continue to monitor    Seizure; Chronic  - Continue with Dilantin/Keppra    Hypertension; Chronic  - Controlled as inpatient    Thiamine deficiency; Chronic  - Continue Thiamine PO 100mg daily     Stage II sacral ulcer; Chronic  - No evidence of infection  - Continue daily wound care  - Off-loading protocol if patient not moving    Transaminitis :   hold lipitor and monitor     Hypernatremia  148   encourage po free water

## 2018-12-02 NOTE — PROGRESS NOTE ADULT - ATTENDING COMMENTS
Bacteremia, leukocytosis, PE and DVT - Acute   Functional decline.   Need PT   Lives Alone  No family around.   Need social support.  ID follow up for the antibiotics adjustments.  Last blood culture negative   Need PT

## 2018-12-03 RX ORDER — SODIUM CHLORIDE 9 MG/ML
1000 INJECTION INTRAMUSCULAR; INTRAVENOUS; SUBCUTANEOUS
Qty: 0 | Refills: 0 | Status: DISCONTINUED | OUTPATIENT
Start: 2018-12-03 | End: 2018-12-03

## 2018-12-03 RX ADMIN — Medication 200 MILLIGRAM(S): at 23:03

## 2018-12-03 RX ADMIN — Medication 100 MILLIGRAM(S): at 05:53

## 2018-12-03 RX ADMIN — Medication 166.67 MILLIGRAM(S): at 11:01

## 2018-12-03 RX ADMIN — SENNA PLUS 2 TABLET(S): 8.6 TABLET ORAL at 21:53

## 2018-12-03 RX ADMIN — PANTOPRAZOLE SODIUM 40 MILLIGRAM(S): 20 TABLET, DELAYED RELEASE ORAL at 17:16

## 2018-12-03 RX ADMIN — PANTOPRAZOLE SODIUM 40 MILLIGRAM(S): 20 TABLET, DELAYED RELEASE ORAL at 05:53

## 2018-12-03 RX ADMIN — Medication 200 MILLIGRAM(S): at 17:15

## 2018-12-03 RX ADMIN — Medication 200 MILLIGRAM(S): at 11:03

## 2018-12-03 RX ADMIN — Medication 200 MILLIGRAM(S): at 05:54

## 2018-12-03 RX ADMIN — Medication 1 MILLIGRAM(S): at 11:03

## 2018-12-03 RX ADMIN — Medication 100 MILLIGRAM(S): at 11:03

## 2018-12-03 RX ADMIN — LEVETIRACETAM 1500 MILLIGRAM(S): 250 TABLET, FILM COATED ORAL at 17:15

## 2018-12-03 RX ADMIN — Medication 166.67 MILLIGRAM(S): at 17:17

## 2018-12-03 RX ADMIN — ENOXAPARIN SODIUM 100 MILLIGRAM(S): 100 INJECTION SUBCUTANEOUS at 17:14

## 2018-12-03 RX ADMIN — CHLORHEXIDINE GLUCONATE 1 APPLICATION(S): 213 SOLUTION TOPICAL at 05:53

## 2018-12-03 RX ADMIN — Medication 200 MILLIGRAM(S): at 01:15

## 2018-12-03 RX ADMIN — Medication 100 MILLIGRAM(S): at 17:15

## 2018-12-03 RX ADMIN — LEVETIRACETAM 1500 MILLIGRAM(S): 250 TABLET, FILM COATED ORAL at 05:54

## 2018-12-03 NOTE — SWALLOW BEDSIDE ASSESSMENT ADULT - SLP GENERAL OBSERVATIONS
Pt awake, +agitation/combative. Pt in no apparent pain
Pt awake, +confusion noted. Pt in no apparent pain
pt awake however + confusion noted. oriented to self not to place or time. no c/o pain. + dysphonic vocal quality

## 2018-12-03 NOTE — SWALLOW BEDSIDE ASSESSMENT ADULT - SLP PERTINENT HISTORY OF CURRENT PROBLEM
pmhx significant for seizure disorder. pt from SNF s/p fall with subsequent SOB. Intubated in ED for airway protection pt found to have b/l PE s/p extubation 11/15

## 2018-12-03 NOTE — PROCEDURE NOTE - PROCEDURE
<<-----Click on this checkbox to enter Procedure SAVANNAH (transesophageal echocardiography)  12/03/2018    Active  [id number]

## 2018-12-03 NOTE — PROGRESS NOTE ADULT - ATTENDING COMMENTS
Consent obtained from patient's HCP Jaren Thorne [phone]    Pre-procedure Assessment:  Patient seen and examined. I agree with the history and physical which I have reviewed and noted any changes below.  12-03-18 @ 11:08

## 2018-12-03 NOTE — PRE-ANESTHESIA EVALUATION ADULT - NSANTHPMHFT_GEN_ALL_CORE
chart reviewed  as per chart pt with a hx of dementia,
52M admitted with respiratory distress, bilateral PE (no evidence of RV strain on TTE), bacteremia. Previously intubated and on pressors but is now extubated, off pressors.  To undergo SAVANNAH

## 2018-12-03 NOTE — CHART NOTE - NSCHARTNOTEFT_GEN_A_CORE
Registered Dietitian Follow-Up     Patient Profile Reviewed                           Yes [x]   No []     Nutrition History Previously Obtained        Yes [x]  No []      Pertinent Subjective Information: Spoke to pt who reports that he is tolerating current diet consistency of pureed with thin liquids well without any issues. Pt is currently consuming ~% of his meal trays.     Pertinent Medical Interventions: SAVANNAH planned for today.     Diet order: NPO after midnight; pureed with thin liquids + DASH/TLC (both orders are active)     Anthropometrics:  - Ht. 190.5cm   - Wt. 106kg (12/1)--wt trending upwards likely 2/2 fluid accumulation     Pertinent Lab Data: Reviewed (11/30): H/H 8.6/27.3, Cr. 0.6, Glu 138     Pertinent Meds: lovenox, abx, lactated ringers, keppra, protonix, dilantin, thiamine, colace, senna, atorvastatin, folic acid, sodium bicarbonate      Physical Findings:  - Appearance: alert, confused upon information recall   - GI function: LBM 12/3   - Oral/Mouth cavity: SLP reccs 11/30--Dysphagia 1 puree and thin liquids  - Skin: stage III sacral pressure ulcer; 2+ edema to B/L foot + ankle      Nutrition Requirements  Weight Used: 97.6kg admit wt, 89.1kg IBW; needs continued from RD note on 11/28      Estimated Energy Needs: 2862-9923 (MSJ x 1.2-1.5 d/t stage III pressure ulcer)      Estimated Protein Needs: 107-133 g/day (1.2-1.5 g/kg IBW d/t 1+ edema) - taking into account renal function vs. critical illness & pressure wound.    Estimated Fluid Needs: per LIP      Nutrient Intake: likely meeting kcal/pro needs at this time; no further nutrition interventions      Previous Nutrition Diagnosis: Inadequate oral intake (resolved)               Nutrition Intervention: meals and snacks, medical food supplements, vitamins/minerals    Reccs:  1. Add Ensure Enlive BID + Prosource Gelatin Q24H to current diet order consistent with SLP reccs   2. Consider ordering MVI once daily      Goal/Expected Outcome: Pt to maintain % po intake of meals, snacks, and supplements within 7 days      Indicator/Monitoring: RD to monitor diet order, energy intake, renal/glucose profile, nutrition focused physical findings, body composition

## 2018-12-03 NOTE — PROGRESS NOTE ADULT - ASSESSMENT
IMPRESSION: ACUTE HYPOXIC RESPIRATORY FAILURE S/P EXTUBATION / PE/ NO RV STRAIN ON CT/ EXTENSIVE DVT/ CVA/ GFF/ RENAL FAILURE, SACRAL ULCER/ ? GI BLEED/ SUBSTANCE ABUSE/ MRSA BACTEREMIA      PLAN:    - ABX PER ID  - aspiration precaution  - keep AC  - POOR PROGNOSIS

## 2018-12-03 NOTE — SWALLOW BEDSIDE ASSESSMENT ADULT - SWALLOW EVAL: DIAGNOSIS
No s/s aspiration/penetration for thin liquids and puree, moderate oral dysphagia
No s/s aspiration/penetration for thin liquids and puree, moderate oral dysphagia. Severe oral dysphagia for mechanical soft, PCA reports pt with difficulty chewing this AM
suspected pharyngeal dysphagia for thins. toleration observed for puree, soft, and nectar thick liquids. mild oral dysphagia for soft

## 2018-12-03 NOTE — PROGRESS NOTE ADULT - SUBJECTIVE AND OBJECTIVE BOX
LIN BENTON  58y, Male      OVERNIGHT EVENTS:    no fevers, has no complaints    VITALS:  T(F): 98.1, Max: 98.5 (12-02-18 @ 20:15)  HR: 98  BP: 111/57  RR: 18Vital Signs Last 24 Hrs  T(C): 36.7 (03 Dec 2018 05:00), Max: 36.9 (02 Dec 2018 20:15)  T(F): 98.1 (03 Dec 2018 05:00), Max: 98.5 (02 Dec 2018 20:15)  HR: 98 (03 Dec 2018 05:00) (80 - 98)  BP: 111/57 (03 Dec 2018 05:00) (111/57 - 115/60)  BP(mean): --  RR: 18 (03 Dec 2018 05:00) (18 - 18)  SpO2: 96% (02 Dec 2018 19:54) (95% - 96%)    TESTS & MEASUREMENTS:              Culture - Blood (collected 11-30-18 @ 19:09)  Source: .Blood None  Preliminary Report (12-02-18 @ 01:02):    No growth to date.    Culture - Urine (collected 11-26-18 @ 09:44)  Source: .Urine Clean Catch (Midstream)  Final Report (11-27-18 @ 22:50):    No growth            RADIOLOGY & ADDITIONAL TESTS:    ANTIBIOTICS:  vancomycin  IVPB 1250 milliGRAM(s) IV Intermittent every 12 hours  vancomycin  IVPB

## 2018-12-03 NOTE — PROGRESS NOTE ADULT - ATTENDING COMMENTS
Attempted to contact patient's HCP Jaren Thorne [phone] but again there was no response. Left voicemail.

## 2018-12-03 NOTE — PROGRESS NOTE ADULT - SUBJECTIVE AND OBJECTIVE BOX
OVERNIGHT EVENTS: looks comfortable, on RA, Last CXR 11/29    Vital Signs Last 24 Hrs  T(C): 36.7 (03 Dec 2018 05:00), Max: 36.9 (02 Dec 2018 20:15)  T(F): 98.1 (03 Dec 2018 05:00), Max: 98.5 (02 Dec 2018 20:15)  HR: 98 (03 Dec 2018 05:00) (80 - 98)  BP: 111/57 (03 Dec 2018 05:00) (111/57 - 115/60)  RR: 18 (03 Dec 2018 05:00) (18 - 18)  SpO2: 96% (02 Dec 2018 19:54) (95% - 96%)    PHYSICAL EXAMINATION:    GENERAL: The patient is awake and alert      HEENT: Head is normocephalic and atraumatic. Extraocular muscles are intact. Mucous membranes are moist.    NECK: Supple.    LUNGS: dec bs r side    HEART: Regular rate and rhythm without murmur.    ABDOMEN: Soft, nontender, and nondistended.   swelling +      LABS:                                12-02-18 @ 07:01  -  12-03-18 @ 07:00  --------------------------------------------------------  IN: 2750 mL / OUT: 0 mL / NET: 2750 mL        MICROBIOLOGY:  Culture Results:   No growth to date. (11-30 @ 19:09)      MEDICATIONS  (STANDING):  chlorhexidine 4% Liquid 1 Application(s) Topical <User Schedule>  docusate sodium 100 milliGRAM(s) Oral two times a day  enoxaparin Injectable 100 milliGRAM(s) SubCutaneous every 12 hours  folic acid 1 milliGRAM(s) Oral daily  lactated ringers. 1000 milliLiter(s) (75 mL/Hr) IV Continuous <Continuous>  levETIRAcetam 1500 milliGRAM(s) Oral two times a day  pantoprazole    Tablet 40 milliGRAM(s) Oral two times a day  phenytoin   Capsule 200 milliGRAM(s) Oral four times a day  senna 2 Tablet(s) Oral at bedtime  thiamine 100 milliGRAM(s) Oral daily  vancomycin  IVPB 1250 milliGRAM(s) IV Intermittent every 12 hours  vancomycin  IVPB        MEDICATIONS  (PRN):      RADIOLOGY & ADDITIONAL STUDIES:

## 2018-12-03 NOTE — PROCEDURE NOTE - NSINFORMCONSENT_GEN_A_CORE
This was an emergent procedure.
HCP Jaren Thorne/Benefits, risks, and possible complications of procedure explained to patient/caregiver who verbalized understanding and gave verbal consent.

## 2018-12-03 NOTE — PROGRESS NOTE ADULT - ATTENDING COMMENTS
patient seen and examined independently     patient has been refusing blood work   will need to get Blood work when patient allows

## 2018-12-03 NOTE — PROGRESS NOTE ADULT - ASSESSMENT
· Assessment		  IMPRESSION:  ORSA bacteremia.  R/o endocarditis  No evidence of psoas abscess/ vertebral infection  Sacral ulcer is not infected and is not the source  WBC 15.0  Bcx 11/30 NTD  vancomycin level low    RECOMMENDATIONS:  SAVANNAH planned   Vancomycin 1250 mg iv q12h. Do not change the dosis asa prior trough was WNR

## 2018-12-03 NOTE — SWALLOW BEDSIDE ASSESSMENT ADULT - SWALLOW EVAL: RECOMMENDED DIET
Dysphagia 1 puree and thin liquids
Dysphagia 1 puree and thin liquids
Dysphagia Diet II mechanical soft consistency with ground meat with Nectar-thickened liquids

## 2018-12-03 NOTE — PROGRESS NOTE ADULT - SUBJECTIVE AND OBJECTIVE BOX
SUBJECTIVE:    Patient is a 58y old Male with history of seizure disorder, s/p carniotomy who presents with a chief complaint of sob and fall (03 Dec 2018 08:39)  He was found to hav multilobar extensive PE (with no evidence of right heart strain) and bilateral DVT.CT head was also positive for multiple small frontal lobe and lacunar infarcts. Patient was initially admitted to ICU. Now extubated in telemetry unit. Patient was found to have to be MRSA bacteremic.. Pateint is currently having no active compliants.      PAST MEDICAL & SURGICAL HISTORY  HLD (hyperlipidemia)  HTN (hypertension)  Seizures  Unilateral inguinal hernia with obstruction and without gangrene, recurrence not specified  No significant past surgical history    SOCIAL HISTORY:  Negative for smoking/alcohol/drug use.     ALLERGIES:  No Known Allergies    MEDICATIONS:  STANDING MEDICATIONS  chlorhexidine 4% Liquid 1 Application(s) Topical <User Schedule>  docusate sodium 100 milliGRAM(s) Oral two times a day  enoxaparin Injectable 100 milliGRAM(s) SubCutaneous every 12 hours  folic acid 1 milliGRAM(s) Oral daily  lactated ringers. 1000 milliLiter(s) IV Continuous <Continuous>  levETIRAcetam 1500 milliGRAM(s) Oral two times a day  pantoprazole    Tablet 40 milliGRAM(s) Oral two times a day  phenytoin   Capsule 200 milliGRAM(s) Oral four times a day  senna 2 Tablet(s) Oral at bedtime  thiamine 100 milliGRAM(s) Oral daily  vancomycin  IVPB 1250 milliGRAM(s) IV Intermittent every 12 hours  vancomycin  IVPB        PRN MEDICATIONS    VITALS:   T(F): 98.1  HR: 98  BP: 111/57  RR: 18  SpO2: 96%    LABS:                    Culture - Blood (collected 30 Nov 2018 19:09)  Source: .Blood None  Preliminary Report (02 Dec 2018 01:02):    No growth to date.          RADIOLOGY:    PHYSICAL EXAM:  GEN: No acute distress  LUNGS: Clear to auscultation bilaterally   HEART: S1/S2 present. RRR.   ABD: Soft, non-tender, non-distended. Bowel sounds present  EXT: NC/NC/NE/2+PP/CHOW/Skin Intact.   NEURO: AAOX3    Intravenous access:   NG tube:   Wagner cathter: SUBJECTIVE:    Patient is a 58y old Male with history of seizure disorder, s/p carniotomy who presents with a chief complaint of sob and fall (03 Dec 2018 08:39)  He was found to hav multilobar extensive PE (with no evidence of right heart strain) and bilateral DVT.CT head was also positive for multiple small frontal lobe and lacunar infarcts. Patient was initially admitted to ICU. Now extubated in telemetry unit. Patient was found to have to be MRSA bacteremic. Patient is currently having no active complaints.      PAST MEDICAL & SURGICAL HISTORY  HLD (hyperlipidemia)  HTN (hypertension)  Seizures  Unilateral inguinal hernia with obstruction and without gangrene, recurrence not specified  No significant past surgical history    SOCIAL HISTORY:  Negative for smoking/alcohol/drug use.     ALLERGIES:  No Known Allergies    MEDICATIONS:  STANDING MEDICATIONS  chlorhexidine 4% Liquid 1 Application(s) Topical <User Schedule>  docusate sodium 100 milliGRAM(s) Oral two times a day  enoxaparin Injectable 100 milliGRAM(s) SubCutaneous every 12 hours  folic acid 1 milliGRAM(s) Oral daily  lactated ringers. 1000 milliLiter(s) IV Continuous <Continuous>  levETIRAcetam 1500 milliGRAM(s) Oral two times a day  pantoprazole    Tablet 40 milliGRAM(s) Oral two times a day  phenytoin   Capsule 200 milliGRAM(s) Oral four times a day  senna 2 Tablet(s) Oral at bedtime  thiamine 100 milliGRAM(s) Oral daily  vancomycin  IVPB 1250 milliGRAM(s) IV Intermittent every 12 hours  vancomycin  IVPB        PRN MEDICATIONS    VITALS:   T(F): 98.1  HR: 98  BP: 111/57  RR: 18  SpO2: 96%    LABS:              Culture - Blood (collected 30 Nov 2018 19:09)  Source: .Blood None  Preliminary Report (02 Dec 2018 01:02):    No growth to date.        PHYSICAL EXAM:  GEN: No acute distress  LUNGS: Clear to auscultation bilaterally   HEART: S1/S2 present. RRR.   ABD: Soft, non-tender, non-distended. Bowel sounds present  EXT: NC/NC/NE/2+PP/CHOW/Skin Intact.   NEURO: AAOX3      Assessment and Plan      Bilateral pulmonary embolism  - CT Chest w/ IV contrast shows multiple bilateral acute pulmonary emboli involving all 5 lobes   - TTE shows no evidence of RIGHT heart strain  - Continue with Lovenox 100mg SubQ BID    MRSA bacteremia; rule out bacterial endocarditis  - MRSA + culture upon admisson; now negative ( 25 and 20 Nov)  - vancomycin level low: Continue with Vancomycin 1250mg IV BID as per ID  -SAVANNAH today    Extensive DVT in BL lower extremities; Acute  - Bilateral Common femoral, Femoral, Deep femoral, Popliteal, Posterior tibial, Gastrocnemius, and Peroneal veins thrombosed  - On therapeutic Lovenox     CVA; Acute or Chronic?  - CT head showed small infarct in the right frontal lobe and small lacunar infarcts within the basal ganglia and thalami, new since the prior exam  - MRI brain pending  - Hold atorvastatin giana too increasing LFT  - Neurology following      One episode of Coffee ground emesis in ER; Resolved  - GI recommendations appreciated: Started on Protnix IV; switched to PO BID  - As per GI, AC benefit outweighs the risk of GI bleed at this time  - Recommend EGD when more stable  - Follow H+H     Acute renal failure; Resolved  - Continue to monitor  - Off IV hydration    Seizure; Chronic  - Continue with Dilantin/Keppra    Hypertension; Chronic  - Controlled as inpatient    Thiamine deficiency; Chronic  - Continue Thiamine PO 100mg daily     Stage II sacral ulcer; Chronic  - No evidence of infection  - Continue daily wound care  - Off-loading protocol if patient not moving    Transaminitis :   Stable  held lipitor   consider US later SUBJECTIVE:    Patient is a 58y old Male with history of seizure disorder, s/p carniotomy who presents with a chief complaint of sob and fall (03 Dec 2018 08:39)  He was found to hav multilobar extensive PE (with no evidence of right heart strain) and bilateral DVT.CT head was also positive for multiple small frontal lobe and lacunar infarcts. Patient was initially admitted to ICU. Now extubated in telemetry unit. Patient was found to have to be MRSA bacteremic. Patient is currently having no active complaints.      PAST MEDICAL & SURGICAL HISTORY  HLD (hyperlipidemia)  HTN (hypertension)  Seizures  Unilateral inguinal hernia with obstruction and without gangrene, recurrence not specified  No significant past surgical history    SOCIAL HISTORY:  Negative for smoking/alcohol/drug use.     ALLERGIES:  No Known Allergies    MEDICATIONS:  STANDING MEDICATIONS  chlorhexidine 4% Liquid 1 Application(s) Topical <User Schedule>  docusate sodium 100 milliGRAM(s) Oral two times a day  enoxaparin Injectable 100 milliGRAM(s) SubCutaneous every 12 hours  folic acid 1 milliGRAM(s) Oral daily  lactated ringers. 1000 milliLiter(s) IV Continuous <Continuous>  levETIRAcetam 1500 milliGRAM(s) Oral two times a day  pantoprazole    Tablet 40 milliGRAM(s) Oral two times a day  phenytoin   Capsule 200 milliGRAM(s) Oral four times a day  senna 2 Tablet(s) Oral at bedtime  thiamine 100 milliGRAM(s) Oral daily  vancomycin  IVPB 1250 milliGRAM(s) IV Intermittent every 12 hours  vancomycin  IVPB        PRN MEDICATIONS    VITALS:   T(F): 98.1  HR: 98  BP: 111/57  RR: 18  SpO2: 96%    LABS:              Culture - Blood (collected 30 Nov 2018 19:09)  Source: .Blood None  Preliminary Report (02 Dec 2018 01:02):    No growth to date.        PHYSICAL EXAM:  GEN: No acute distress  LUNGS: Clear to auscultation bilaterally   CV: S1/S2 present. RRR.   GI: Soft, non-tender, non-distended. Bowel sounds present  EXT: NC/NC/NE/2+PP/CHOW/Skin Intact.   NEURO: AAOX3      Assessment and Plan      Bilateral pulmonary embolism  - CT Chest w/ IV contrast shows multiple bilateral acute pulmonary emboli involving all 5 lobes   - TTE shows no evidence of RIGHT heart strain  - Continue with Lovenox 100mg SubQ BID    MRSA bacteremia; rule out bacterial endocarditis  - MRSA + culture upon admisson; now negative ( 25 and 20 Nov)  - vancomycin level low: Continue with Vancomycin 1250mg IV BID as per ID  -SAVANNAH today    Extensive DVT in BL lower extremities; Acute  - Bilateral Common femoral, Femoral, Deep femoral, Popliteal, Posterior tibial, Gastrocnemius, and Peroneal veins thrombosed  - On therapeutic Lovenox     CVA; Acute or Chronic?  - CT head showed small infarct in the right frontal lobe and small lacunar infarcts within the basal ganglia and thalami, new since the prior exam  - MRI brain pending  - Hold atorvastatin due too increasing LFT  - Neurology following      One episode of Coffee ground emesis in ER; Resolved  - GI recommendations appreciated: Started on Protnix IV; switched to PO BID  - As per GI, AC benefit outweighs the risk of GI bleed at this time  - Recommend EGD when more stable  - Follow H+H     Acute renal failure; Resolved  - Continue to monitor  - Off IV hydration    Seizure; Chronic  - Continue with Dilantin/Keppra    Hypertension; Chronic  - Controlled as inpatient    Thiamine deficiency; Chronic  - Continue Thiamine PO 100mg daily     Stage II sacral ulcer; Chronic  - No evidence of infection  - Continue daily wound care  - Off-loading protocol if patient not moving    Transaminitis :   Stable  held lipitor   consider US later

## 2018-12-04 LAB
ALBUMIN SERPL ELPH-MCNC: 2.6 G/DL — LOW (ref 3.5–5.2)
ALP SERPL-CCNC: 119 U/L — HIGH (ref 30–115)
ALT FLD-CCNC: 75 U/L — HIGH (ref 0–41)
ANION GAP SERPL CALC-SCNC: 13 MMOL/L — SIGNIFICANT CHANGE UP (ref 7–14)
AST SERPL-CCNC: 81 U/L — HIGH (ref 0–41)
BILIRUB SERPL-MCNC: 0.3 MG/DL — SIGNIFICANT CHANGE UP (ref 0.2–1.2)
BUN SERPL-MCNC: 10 MG/DL — SIGNIFICANT CHANGE UP (ref 10–20)
CALCIUM SERPL-MCNC: 8.4 MG/DL — LOW (ref 8.5–10.1)
CHLORIDE SERPL-SCNC: 102 MMOL/L — SIGNIFICANT CHANGE UP (ref 98–110)
CO2 SERPL-SCNC: 24 MMOL/L — SIGNIFICANT CHANGE UP (ref 17–32)
CREAT SERPL-MCNC: 0.6 MG/DL — LOW (ref 0.7–1.5)
GLUCOSE SERPL-MCNC: 90 MG/DL — SIGNIFICANT CHANGE UP (ref 70–99)
HCT VFR BLD CALC: 28.2 % — LOW (ref 42–52)
HGB BLD-MCNC: 9.1 G/DL — LOW (ref 14–18)
MCHC RBC-ENTMCNC: 29.1 PG — SIGNIFICANT CHANGE UP (ref 27–31)
MCHC RBC-ENTMCNC: 32.3 G/DL — SIGNIFICANT CHANGE UP (ref 32–37)
MCV RBC AUTO: 90.1 FL — SIGNIFICANT CHANGE UP (ref 80–94)
NRBC # BLD: 0 /100 WBCS — SIGNIFICANT CHANGE UP (ref 0–0)
PLATELET # BLD AUTO: 456 K/UL — HIGH (ref 130–400)
POTASSIUM SERPL-MCNC: 3.9 MMOL/L — SIGNIFICANT CHANGE UP (ref 3.5–5)
POTASSIUM SERPL-SCNC: 3.9 MMOL/L — SIGNIFICANT CHANGE UP (ref 3.5–5)
PROT SERPL-MCNC: 5.3 G/DL — LOW (ref 6–8)
RBC # BLD: 3.13 M/UL — LOW (ref 4.7–6.1)
RBC # FLD: 14.6 % — HIGH (ref 11.5–14.5)
SODIUM SERPL-SCNC: 139 MMOL/L — SIGNIFICANT CHANGE UP (ref 135–146)
WBC # BLD: 12.3 K/UL — HIGH (ref 4.8–10.8)
WBC # FLD AUTO: 12.3 K/UL — HIGH (ref 4.8–10.8)

## 2018-12-04 RX ADMIN — PANTOPRAZOLE SODIUM 40 MILLIGRAM(S): 20 TABLET, DELAYED RELEASE ORAL at 06:26

## 2018-12-04 RX ADMIN — ENOXAPARIN SODIUM 100 MILLIGRAM(S): 100 INJECTION SUBCUTANEOUS at 06:25

## 2018-12-04 RX ADMIN — PANTOPRAZOLE SODIUM 40 MILLIGRAM(S): 20 TABLET, DELAYED RELEASE ORAL at 17:29

## 2018-12-04 RX ADMIN — Medication 100 MILLIGRAM(S): at 11:23

## 2018-12-04 RX ADMIN — Medication 100 MILLIGRAM(S): at 06:24

## 2018-12-04 RX ADMIN — Medication 200 MILLIGRAM(S): at 17:29

## 2018-12-04 RX ADMIN — Medication 166.67 MILLIGRAM(S): at 17:30

## 2018-12-04 RX ADMIN — Medication 166.67 MILLIGRAM(S): at 06:24

## 2018-12-04 RX ADMIN — ENOXAPARIN SODIUM 100 MILLIGRAM(S): 100 INJECTION SUBCUTANEOUS at 17:29

## 2018-12-04 RX ADMIN — Medication 100 MILLIGRAM(S): at 17:29

## 2018-12-04 RX ADMIN — Medication 200 MILLIGRAM(S): at 11:23

## 2018-12-04 RX ADMIN — SENNA PLUS 2 TABLET(S): 8.6 TABLET ORAL at 22:36

## 2018-12-04 RX ADMIN — LEVETIRACETAM 1500 MILLIGRAM(S): 250 TABLET, FILM COATED ORAL at 06:25

## 2018-12-04 RX ADMIN — Medication 200 MILLIGRAM(S): at 23:13

## 2018-12-04 RX ADMIN — Medication 1 MILLIGRAM(S): at 11:23

## 2018-12-04 RX ADMIN — Medication 200 MILLIGRAM(S): at 06:24

## 2018-12-04 RX ADMIN — CHLORHEXIDINE GLUCONATE 1 APPLICATION(S): 213 SOLUTION TOPICAL at 06:24

## 2018-12-04 RX ADMIN — LEVETIRACETAM 1500 MILLIGRAM(S): 250 TABLET, FILM COATED ORAL at 17:29

## 2018-12-04 NOTE — PROGRESS NOTE ADULT - SUBJECTIVE AND OBJECTIVE BOX
LIN BENTON  PMI:  57M, PMHx of seizure disorder, DLD, presents to the ED sp fall from NH. Pt was brought in by EMS from his nursing home due to a fall and then experienced SOB. It is unclear which event happened first, either the SOB or fall. During assessment patient was is respiratory distress and hypoxic to 80s on RA and tachypneic. Pt reports b/l lateral chest pain and difficulty breathing and was intubated. Developed septicemia.   In ed, he was found to have bilateral PE without strain, melina with HAGMA and lactic acidosis, coffee ground emesis. Post extubation he was found to have altered mental status. MRI brain was done and found to have a small infarct in the right frontal lobe and small lacunar infarcts within the basal ganglia and thalami, indeterminate in age. Small infarct in the left frontal lobe is stable and chronic.        Relevant PMH:  [] Prior ischemic stroke/TIA  [] Afib  []CAD  []HTN  [x]DLD  []DM []PVD []Obesity [] Sedintary lifestyle []CHF  []TEJINDER  []Cancer Hx     Social History: [] Smoking []  Drug Use: [x]   Alcohol Use:   [] Other:      Possible Location of Stroke:  Small infarct in the right frontal lobe and small lacunar infarcts   within the basal ganglia and thalami  Possible Cause of Stroke:  Unknown, will have a better understanding post stroke workup.  Relevant Cerebral Imaging:  < from: CT Head No Cont (11.23.18 @ 02:01) >  IMPRESSION:   In comparison to the previous head CT dated 4/20/2016:    1.  No evidence of acute intracranial hemorrhage.    2.  Interval right parietal craniotomy with underlying encephalomalacia,   correlate with surgical history.    3.  Small infarct in the right frontal lobe and small lacunar infarcts   within the basal ganglia and thalami, new since the prior exam an   indeterminate in age. Small infarct in the left frontal lobe is stable   and chronic.    4.  Moderate to severe chronic microvascular changes, moderately   progressed.    Relevant Cervicocerebral Imaging:  MRA pending    Relevant blood tests:  LDL a1C pending    Relevant cardiac rhythm monitoring:  on telemetry >24 hours and no events reported.  Relevant Cardiac Structure:(TTE/SAVANNAH +/-):[]No intracardiac thrombus/[] no vegetation/[]no akynesia/EF:  < from: Transthoracic Echocardiogram (11.23.18 @ 08:23) >  Summary:   1. Septal Motion is dysynchronous.   2. In subcostal views RV appears normal      Not visualized in other views      Pulmonary pressures not able to be evlauate.   3. Mild mitral annular calcification.    Home Medications:  Flomax 0.4 mg oral capsule: 1 cap(s) orally once a day (23 Nov 2018 04:31)  folic acid 1 mg oral tablet: 1 tab(s) orally once a day (23 Nov 2018 04:31)  Keppra 1000 mg oral tablet: 1 tab(s) orally every 8 hours (23 Nov 2018 04:31)  Lipitor 40 mg oral tablet: 1 tab(s) orally once a day (23 Nov 2018 04:31)  Norvasc 10 mg oral tablet: 1 tab(s) orally once a day (23 Nov 2018 04:31)  phenytoin 200 mg oral capsule, extended release: 1 cap(s) orally every 8 hours (23 Nov 2018 04:31)  phenytoin 200 mg oral capsule, extended release: 1 cap(s) orally once a day (at bedtime) (23 Nov 2018 04:31)  thiamine 100 mg oral tablet: 1 tab(s) orally once a day (23 Nov 2018 04:31)  Tylenol 325 mg oral tablet: 2 tab(s) orally every 4 hours, As Needed (23 Nov 2018 04:31)      MEDICATIONS  (STANDING):  chlorhexidine 4% Liquid 1 Application(s) Topical <User Schedule>  docusate sodium 100 milliGRAM(s) Oral two times a day  enoxaparin Injectable 100 milliGRAM(s) SubCutaneous every 12 hours  folic acid 1 milliGRAM(s) Oral daily  levETIRAcetam 1500 milliGRAM(s) Oral two times a day  pantoprazole    Tablet 40 milliGRAM(s) Oral two times a day  phenytoin   Capsule 200 milliGRAM(s) Oral four times a day  senna 2 Tablet(s) Oral at bedtime  thiamine 100 milliGRAM(s) Oral daily  vancomycin  IVPB 1250 milliGRAM(s) IV Intermittent every 12 hours  vancomycin  IVPB          PT/OT/Speech/Rehab/S&Swr:pending    Exam:    Vital Signs Last 24 Hrs  T(C): 36.8 (04 Dec 2018 13:04), Max: 36.9 (03 Dec 2018 20:33)  T(F): 98.2 (04 Dec 2018 13:04), Max: 98.5 (03 Dec 2018 20:33)  HR: 94 (04 Dec 2018 13:04) (94 - 99)  BP: 131/76 (04 Dec 2018 13:04) (107/58 - 131/76)  BP(mean): --  RR: 19 (04 Dec 2018 13:04) (18 - 19)  SpO2: 97% (04 Dec 2018 10:32) (97% - 97%)    NIHSS    Exam limited by cooperation  LOC:       1a:  0   1b(Questions):   0        1c(Instructions):  0           Best Gaze:0  Visual:0  Motor:                 RUE: 0    RLE:     0LUE:   0  LLE:    0(bilateral lower extremities edematous) FACE:   0  Limb Ataxia:0  Sensory:     0  Language:     0  Dysarthria:        0  Extinction and Inattention:0    NIHSS on admission:    0          m-RS:0    Impression:  57M, PMHx of seizure disorder, DLD, presents to the ED sp fall from NH. Pt was brought in by EMS from his nursing home due to a fall and then experienced SOB. It is unclear which event happened first, either the SOB or fall. During assessment patient was is respiratory distress and hypoxic to 80s on RA and tachypneic. Pt reports b/l lateral chest pain and difficulty breathing and was intubated. Developed septicemia.   In ed, he was found to have bilateral PE without strain, melina with HAGMA and lactic acidosis, coffee ground emesis. Post extubation he was found to have altered mental status. MRI brain was done and found to have a small infarct in the right frontal lobe and small lacunar infarcts within the basal ganglia and thalami, indeterminate in age. Small infarct in the left frontal lobe is stable and chronic. Etiology of ischemic stroke unknown, will have a better understanding post stroke workup.    Suggestion:  Routine stroke workup including:  MRA head and neck  LDL A1C  Continue Telemetry monitoring.    May continue anticoagulation provided the blood pressure is kept less than 140/80  D/w attending and statins stopped because of rise in LFTs  Disposition:  Continue telemetry.    Iam Graham NP  [phone] LIN BENTON  PMI:  57M, PMHx of seizure disorder, DLD, presents to the ED sp fall from NH. Pt was brought in by EMS from his nursing home due to a fall and then experienced SOB. It is unclear which event happened first, either the SOB or fall. During assessment patient was is respiratory distress and hypoxic to 80s on RA and tachypneic. Pt reports b/l lateral chest pain and difficulty breathing and was intubated. Developed septicemia.   In ed, he was found to have bilateral PE without strain, melina with HAGMA and lactic acidosis, coffee ground emesis. Post extubation he was found to have altered mental status. MRI brain was done and found to have a small infarct in the right frontal lobe and small lacunar infarcts within the basal ganglia and thalami, indeterminate in age. Small infarct in the left frontal lobe is stable and chronic.        Relevant PMH:  [] Prior ischemic stroke/TIA  [] Afib  []CAD  []HTN  [x]DLD  []DM []PVD []Obesity [] Sedintary lifestyle []CHF  []TEJINDER  []Cancer Hx     Social History: [] Smoking []  Drug Use: [x]   Alcohol Use:   [] Other:      Possible Location of Stroke:  Small infarct in the right frontal lobe and small lacunar infarcts   within the basal ganglia and thalami  Possible Cause of Stroke:  Unknown, will have a better understanding post stroke workup.  Relevant Cerebral Imaging:  < from: CT Head No Cont (11.23.18 @ 02:01) >  IMPRESSION:   In comparison to the previous head CT dated 4/20/2016:    1.  No evidence of acute intracranial hemorrhage.    2.  Interval right parietal craniotomy with underlying encephalomalacia,   correlate with surgical history.    3.  Small infarct in the right frontal lobe and small lacunar infarcts   within the basal ganglia and thalami, new since the prior exam an   indeterminate in age. Small infarct in the left frontal lobe is stable   and chronic.    4.  Moderate to severe chronic microvascular changes, moderately   progressed.    Relevant Cervicocerebral Imaging:  MRA pending    Relevant blood tests:  LDL a1C pending    Relevant cardiac rhythm monitoring:  on telemetry >24 hours and no events reported.  Relevant Cardiac Structure:(TTE/SAVANNAH +/-):[]No intracardiac thrombus/[] no vegetation/[]no akynesia/EF:  < from: Transthoracic Echocardiogram (11.23.18 @ 08:23) >  Summary:   1. Septal Motion is dysynchronous.   2. In subcostal views RV appears normal      Not visualized in other views      Pulmonary pressures not able to be evlauate.   3. Mild mitral annular calcification.    Home Medications:  Flomax 0.4 mg oral capsule: 1 cap(s) orally once a day (23 Nov 2018 04:31)  folic acid 1 mg oral tablet: 1 tab(s) orally once a day (23 Nov 2018 04:31)  Keppra 1000 mg oral tablet: 1 tab(s) orally every 8 hours (23 Nov 2018 04:31)  Lipitor 40 mg oral tablet: 1 tab(s) orally once a day (23 Nov 2018 04:31)  Norvasc 10 mg oral tablet: 1 tab(s) orally once a day (23 Nov 2018 04:31)  phenytoin 200 mg oral capsule, extended release: 1 cap(s) orally every 8 hours (23 Nov 2018 04:31)  phenytoin 200 mg oral capsule, extended release: 1 cap(s) orally once a day (at bedtime) (23 Nov 2018 04:31)  thiamine 100 mg oral tablet: 1 tab(s) orally once a day (23 Nov 2018 04:31)  Tylenol 325 mg oral tablet: 2 tab(s) orally every 4 hours, As Needed (23 Nov 2018 04:31)      MEDICATIONS  (STANDING):  chlorhexidine 4% Liquid 1 Application(s) Topical <User Schedule>  docusate sodium 100 milliGRAM(s) Oral two times a day  enoxaparin Injectable 100 milliGRAM(s) SubCutaneous every 12 hours  folic acid 1 milliGRAM(s) Oral daily  levETIRAcetam 1500 milliGRAM(s) Oral two times a day  pantoprazole    Tablet 40 milliGRAM(s) Oral two times a day  phenytoin   Capsule 200 milliGRAM(s) Oral four times a day  senna 2 Tablet(s) Oral at bedtime  thiamine 100 milliGRAM(s) Oral daily  vancomycin  IVPB 1250 milliGRAM(s) IV Intermittent every 12 hours  vancomycin  IVPB          PT/OT/Speech/Rehab/S&Swr:pending    Exam:    Vital Signs Last 24 Hrs  T(C): 36.8 (04 Dec 2018 13:04), Max: 36.9 (03 Dec 2018 20:33)  T(F): 98.2 (04 Dec 2018 13:04), Max: 98.5 (03 Dec 2018 20:33)  HR: 94 (04 Dec 2018 13:04) (94 - 99)  BP: 131/76 (04 Dec 2018 13:04) (107/58 - 131/76)  BP(mean): --  RR: 19 (04 Dec 2018 13:04) (18 - 19)  SpO2: 97% (04 Dec 2018 10:32) (97% - 97%)    NIHSS    Exam limited by cooperation  LOC:       1a:  0   1b(Questions):   0        1c(Instructions):  0           Best Gaze:0  Visual:0  Motor:                 RUE: 0    RLE:     0LUE:   0  LLE:    0(bilateral lower extremities edematous) FACE:   0  Limb Ataxia:0  Sensory:     0  Language:     0  Dysarthria:        0  Extinction and Inattention:0    NIHSS on admission:    0          m-RS:0    Impression:  57M, PMHx of seizure disorder, DLD, presents to the ED sp fall from NH. Pt was brought in by EMS from his nursing home due to a fall and then experienced SOB. It is unclear which event happened first, either the SOB or fall. During assessment patient was is respiratory distress and hypoxic to 80s on RA and tachypneic. Pt reports b/l lateral chest pain and difficulty breathing and was intubated. Developed septicemia.   In ed, he was found to have bilateral PE without strain, melina with HAGMA and lactic acidosis, coffee ground emesis. Post extubation he was found to have altered mental status. MRI brain was done and found to have a small infarct in the right frontal lobe and small lacunar infarcts within the basal ganglia and thalami, indeterminate in age. Small infarct in the left frontal lobe is stable and chronic. SDH underlying the craniotomy. Etiology of ischemic stroke unknown, will have a better understanding post stroke workup.    Suggestion:  Routine stroke workup including:  MRA head and neck  LDL A1C  Continue Telemetry monitoring.    May continue anticoagulation provided the blood pressure is kept less than 140/80  D/w attending and statins stopped because of rise in LFTs  Disposition:  Continue telemetry.    Iam Garham NP  [phone]

## 2018-12-04 NOTE — PROGRESS NOTE ADULT - ASSESSMENT
· Assessment		  IMPRESSION:  ORSA uncomplicated bacteremia as BCx repeatedly negative and SAVANNAH with no vegetations  Sacral ulcer is not infected and is not the source  WBC 12.3  Bcx 11/30 NTD  SAVANNAH neg    RECOMMENDATIONS:  Vancomycin 1250 mg iv q12h fpr 14 days starting 11/24.  Once off ABx will recommend repeating BCx to document eradication of bacteremia  Repeat Vanco trough please

## 2018-12-04 NOTE — CONSULT NOTE ADULT - ATTENDING COMMENTS
56 y/o m s/p possible fall   No external sign of trauma ,p/w sob and using accessary muscles:  all ct were negative for acute trauma possible t.p fx l2-3 , ct chest : b/l pe and dvt lower extremities.  wbc 31 , la : 13 .  exam: unremarkable .    CT Head :  No evidence of acute trauma , encephalomalacia, Small infarct in the right frontal lobe and small lacunar infarcts  within the basal ganglia and thalami, and Moderate to severe chronic microvascular changes, moderately progressed.   CT Chest / Abdomen :    1. Multiple bilateral acute pulmonary emboli involving all 5 lobes of the   lungs as above. No definite CT evidence of right heart strain.   Compression fractures of T4, T5, T6, and T8 of indeterminate age.   Minimally displaced right L3 transverse process fracture.  Severe stenosis at the origin of the left renal artery with poststenotic  dilatation.     DVT : Bilateral common femoral, femoral, deep femoral, popliteal, Posterior  tibial and peroneal veins are thrombosed bilaterally as well as bilateral   gastrocnemius veins.     Impresssion : Likely trauma are old.     New acute stroke and Pulmonary Embolism.    No surgical intervention.     Will need evaluation when pt more stable and extubated.
As above
I was Physically Present for the key portions of the evaluation   I agree with the above History  , Physical examination Assessment and plan   I have Reviewed , Modified or appended where appropriate.  Please check A and P as above   1- MEDHAT / lactic acidosis / PE / oliguria / hyperkalemia  keep MAP>65  follow UO repeat BMP  Adjust antibx to CR  may need RRT   will follow in few hours   D/W ccu team

## 2018-12-04 NOTE — CONSULT NOTE ADULT - ASSESSMENT
Probable granulation under prior craniotomy flap.  Unlikely subdural.  However, even if so, it is relatively minor.  Can continue with anticoagulation for PE.  CT head if mental status change.

## 2018-12-04 NOTE — PROGRESS NOTE ADULT - ATTENDING COMMENTS
Patient seen and examined and agree with above except as noted.  Patient has no new complaints.  No focal complaints however does not feel that good.  MRI reviewed and discussed results with patient.  A+Ox3 naming and repeition normal  CN 2-12 normal  power 5/5 in UE and 5-/5 in LE proximally 5/5 distally  FTN NL  DTR absent throughout  Gait deferred    NIHSS 0    Patient with complicated hospital course found to have small infarct in right hemisphere.  Etiology either small vessel disease vs embolic or thrombotic  1. MRA H+N  2. Continue current medications  3. PT/OT  4. Check dilantin level  5. After MRA may start aspirin 81mg QD

## 2018-12-04 NOTE — CONSULT NOTE ADULT - SUBJECTIVE AND OBJECTIVE BOX
HISTORY OF PRESENT ILLNESS:     Patient is a 58y old Male with history of seizure disorder, s/p carniotomy who presents with a chief complaint of sob and fall (03 Dec 2018 08:39)  He was found to have multilobar extensive PE (with no evidence of right heart strain) and bilateral DVT.CT head was also positive for multiple small frontal lobe and lacunar infarcts. Patient was initially admitted to ICU. Now extubated in telemetry unit. Patient was found to have to be MRSA bacteremic. Patient is currently having no active complaints. Pt had MRI of the brain which showed   < from: MR Head No Cont (12.04.18 @ 11:41) >  1. Small, likely subacute, subdural hematoma underlying the right   parietal craniotomy without significant adjacent mass effect.    2. Acute/subacute lacunar infarction in the right frontal lobe.    3. Extensive chronic microvascular ischemic changes and small chronic   infarctions.    < end of copied text >  Pt is currently on Therapeutic Lovenox. Pt is refusing to answer questions. Told me to leave his room    PAST MEDICAL & SURGICAL HISTORY:  HLD (hyperlipidemia)  HTN (hypertension)  Seizures  Unilateral inguinal hernia with obstruction and without gangrene, recurrence not specified  No significant past surgical history    FAMILY HISTORY:  No pertinent family history in first degree relatives    Allergies    No Known Allergies    Intolerances    Seafood (Unknown)    MEDICATIONS:  Antibiotics:  vancomycin  IVPB 1250 milliGRAM(s) IV Intermittent every 12 hours  vancomycin  IVPB        Neuro:  levETIRAcetam 1500 milliGRAM(s) Oral two times a day  phenytoin   Capsule 200 milliGRAM(s) Oral four times a day    Anticoagulation:  enoxaparin Injectable 100 milliGRAM(s) SubCutaneous every 12 hours    OTHER:  chlorhexidine 4% Liquid 1 Application(s) Topical <User Schedule>  docusate sodium 100 milliGRAM(s) Oral two times a day  pantoprazole    Tablet 40 milliGRAM(s) Oral two times a day  senna 2 Tablet(s) Oral at bedtime    IVF:  folic acid 1 milliGRAM(s) Oral daily  thiamine 100 milliGRAM(s) Oral daily      Vital Signs Last 24 Hrs  T(C): 36.8 (04 Dec 2018 13:04), Max: 36.9 (03 Dec 2018 20:33)  T(F): 98.2 (04 Dec 2018 13:04), Max: 98.5 (03 Dec 2018 20:33)  HR: 94 (04 Dec 2018 13:04) (94 - 99)  BP: 131/76 (04 Dec 2018 13:04) (107/58 - 131/76)  BP(mean): --  RR: 19 (04 Dec 2018 13:04) (18 - 19)  SpO2: 97% (04 Dec 2018 10:32) (97% - 97%)    PHYSICAL EXAM:  Awake and alert  Angry  Tracks  CHOW  Refusing to follow commands      LABS:                        9.1    12.30 )-----------( 456      ( 04 Dec 2018 01:21 )             28.2     12-04    139  |  102  |  10  ----------------------------<  90  3.9   |  24  |  0.6<L>    Ca    8.4<L>      04 Dec 2018 01:21    TPro  5.3<L>  /  Alb  2.6<L>  /  TBili  0.3  /  DBili  x   /  AST  81<H>  /  ALT  75<H>  /  AlkPhos  119<H>  12-04    CULTURES:  Culture Results:   No growth to date. (11-30 @ 19:09)  Culture Results:   No growth (11-26 @ 09:44)    Assessment:  As above    Plan:  No Neurosurgical Intervention  Can continue therapeutic Lovenox  If pt has any significant MS changes would get stat Head CT

## 2018-12-04 NOTE — PROGRESS NOTE ADULT - SUBJECTIVE AND OBJECTIVE BOX
LIN BENTON  58y, Male      OVERNIGHT EVENTS:    no fevers, has no complaints    VITALS:  T(F): 98.5, Max: 98.5 (12-03-18 @ 20:33)  HR: 99  BP: 123/70  RR: 18Vital Signs Last 24 Hrs  T(C): 36.9 (03 Dec 2018 20:33), Max: 36.9 (03 Dec 2018 20:33)  T(F): 98.5 (03 Dec 2018 20:33), Max: 98.5 (03 Dec 2018 20:33)  HR: 99 (03 Dec 2018 20:33) (86 - 99)  BP: 123/70 (03 Dec 2018 20:33) (123/70 - 138/78)  BP(mean): --  RR: 18 (03 Dec 2018 20:33) (18 - 20)  SpO2: --    TESTS & MEASUREMENTS:                        9.1    12.30 )-----------( 456      ( 04 Dec 2018 01:21 )             28.2     12-04    139  |  102  |  10  ----------------------------<  90  3.9   |  24  |  0.6<L>    Ca    8.4<L>      04 Dec 2018 01:21    TPro  5.3<L>  /  Alb  2.6<L>  /  TBili  0.3  /  DBili  x   /  AST  81<H>  /  ALT  75<H>  /  AlkPhos  119<H>  12-04    LIVER FUNCTIONS - ( 04 Dec 2018 01:21 )  Alb: 2.6 g/dL / Pro: 5.3 g/dL / ALK PHOS: 119 U/L / ALT: 75 U/L / AST: 81 U/L / GGT: x             Culture - Blood (collected 11-30-18 @ 19:09)  Source: .Blood None  Preliminary Report (12-02-18 @ 01:02):    No growth to date.            RADIOLOGY & ADDITIONAL TESTS:    ANTIBIOTICS:  vancomycin  IVPB 1250 milliGRAM(s) IV Intermittent every 12 hours  vancomycin  IVPB

## 2018-12-04 NOTE — PROGRESS NOTE ADULT - SUBJECTIVE AND OBJECTIVE BOX
SUBJECTIVE:    Patient is a 58y old Male with history of seizure disorder, s/p carniotomy who presents with a chief complaint of sob and fall (03 Dec 2018 08:39)  He was found to have multilobar extensive PE (with no evidence of right heart strain) and bilateral DVT.CT head was also positive for multiple small frontal lobe and lacunar infarcts. Patient was initially admitted to ICU. Now extubated in telemetry unit. Patient was found to have to be MRSA bacteremic. Patient is currently having no active complaints.      PAST MEDICAL & SURGICAL HISTORY  HLD (hyperlipidemia)  HTN (hypertension)  Seizures  Unilateral inguinal hernia with obstruction and without gangrene, recurrence not specified  No significant past surgical history    SOCIAL HISTORY:  Negative for smoking/alcohol/drug use.     ALLERGIES:  No Known Allergies    MEDICATIONS:  STANDING MEDICATIONS  chlorhexidine 4% Liquid 1 Application(s) Topical <User Schedule>  docusate sodium 100 milliGRAM(s) Oral two times a day  enoxaparin Injectable 100 milliGRAM(s) SubCutaneous every 12 hours  folic acid 1 milliGRAM(s) Oral daily  levETIRAcetam 1500 milliGRAM(s) Oral two times a day  pantoprazole    Tablet 40 milliGRAM(s) Oral two times a day  phenytoin   Capsule 200 milliGRAM(s) Oral four times a day  senna 2 Tablet(s) Oral at bedtime  thiamine 100 milliGRAM(s) Oral daily  vancomycin  IVPB 1250 milliGRAM(s) IV Intermittent every 12 hours  vancomycin  IVPB        PRN MEDICATIONS    VITALS:   T(F): 98  HR: 95  BP: 107/58  RR: 18  SpO2: 97%    LABS:                        9.1    12.30 )-----------( 456      ( 04 Dec 2018 01:21 )             28.2     12-04    139  |  102  |  10  ----------------------------<  90  3.9   |  24  |  0.6<L>    Ca    8.4<L>      04 Dec 2018 01:21    TPro  5.3<L>  /  Alb  2.6<L>  /  TBili  0.3  /  DBili  x   /  AST  81<H>  /  ALT  75<H>  /  AlkPhos  119<H>  12-04      PHYSICAL EXAM:  GEN: No acute distress  LUNGS: Clear to auscultation bilaterally   CV: S1/S2 present. RRR.   GI: Soft, non-tender, non-distended. Bowel sounds present  EXT: NC/NC/NE/2+PP/CHOW/Skin Intact.   NEURO: AAOX3      Assessment and Plan      Bilateral pulmonary embolism  - CT Chest w/ IV contrast shows multiple bilateral acute pulmonary emboli involving all 5 lobes   - TTE shows no evidence of RIGHT heart strain  - Continue with Lovenox 100mg SubQ BID  - Will need to be switched to NOAC before discharge    Extensive DVT in BL lower extremities; Acute  - Bilateral Common femoral, Femoral, Deep femoral, Popliteal, Posterior tibial, Gastrocnemius, and Peroneal veins thrombosed  - On therapeutic Lovenox     MRSA bacteremia; rule out bacterial endocarditis  - MRSA + culture upon admisson; now negative ( 25 and 30 Nov)  - On vancomycin 1250mg IV BID as per ID/ check vancomycin trough level today  -SAVANNAH negative for vegetations  - Continue vancomycin till 12/6/2018 as per ID      CVA; Acute or Chronic?  - CT head showed small infarct in the right frontal lobe and small lacunar infarcts within the basal ganglia and thalami, new since the prior exam  - MRI brain done: pending report  - Hold atorvastatin due too increasing LFTs  - Neurology following      One episode of Coffee ground emesis in ER; Resolved  - GI recommendations appreciated: Started on Protnix IV; switched to PO BID  - As per GI, AC benefit outweighs the risk of GI bleed at this time  - Recommend EGD when more stable  - Follow H+H     Transaminitis :   Persistently high transaminases  Lipitor on hold   Will do abdominal US and Hepatitis workup ( r/o Alcoholic liver disease vs hepatitis)    Acute renal failure; Resolved  - Continue to monitor  - Off IV hydration since yesterday    Seizure; Chronic  - Continue with Dilantin/Keppra    Hypertension; Chronic  - Controlled as inpatient    Thiamine deficiency; Chronic  - Continue Thiamine PO 100mg daily     Stage II sacral ulcer; Chronic  - No evidence of infection  - Continue daily wound care  - Off-loading protocol if patient not moving SUBJECTIVE:    Patient is a 58y old Male with history of seizure disorder, s/p carniotomy who presents with a chief complaint of sob and fall (03 Dec 2018 08:39)  He was found to have multilobar extensive PE (with no evidence of right heart strain) and bilateral DVT.CT head was also positive for multiple small frontal lobe and lacunar infarcts. Patient was initially admitted to ICU. Now extubated in telemetry unit. Patient was found to have to be MRSA bacteremic. Patient is currently having no active complaints.      PAST MEDICAL & SURGICAL HISTORY  HLD (hyperlipidemia)  HTN (hypertension)  Seizures  Unilateral inguinal hernia with obstruction and without gangrene, recurrence not specified  No significant past surgical history    SOCIAL HISTORY:  Negative for smoking/alcohol/drug use.     ALLERGIES:  No Known Allergies    MEDICATIONS:  STANDING MEDICATIONS  chlorhexidine 4% Liquid 1 Application(s) Topical <User Schedule>  docusate sodium 100 milliGRAM(s) Oral two times a day  enoxaparin Injectable 100 milliGRAM(s) SubCutaneous every 12 hours  folic acid 1 milliGRAM(s) Oral daily  levETIRAcetam 1500 milliGRAM(s) Oral two times a day  pantoprazole    Tablet 40 milliGRAM(s) Oral two times a day  phenytoin   Capsule 200 milliGRAM(s) Oral four times a day  senna 2 Tablet(s) Oral at bedtime  thiamine 100 milliGRAM(s) Oral daily  vancomycin  IVPB 1250 milliGRAM(s) IV Intermittent every 12 hours  vancomycin  IVPB        PRN MEDICATIONS    VITALS:   T(F): 98  HR: 95  BP: 107/58  RR: 18  SpO2: 97%    LABS:                        9.1    12.30 )-----------( 456      ( 04 Dec 2018 01:21 )             28.2     12-04    139  |  102  |  10  ----------------------------<  90  3.9   |  24  |  0.6<L>    Ca    8.4<L>      04 Dec 2018 01:21    TPro  5.3<L>  /  Alb  2.6<L>  /  TBili  0.3  /  DBili  x   /  AST  81<H>  /  ALT  75<H>  /  AlkPhos  119<H>  12-04      PHYSICAL EXAM:  GEN: No acute distress  LUNGS: Clear to auscultation bilaterally   CV: S1/S2 present. RRR.   GI: Soft, non-tender, non-distended. Bowel sounds present  EXT: NC/NC/NE/2+PP/CHOW/Skin Intact.   NEURO: AAOX3      Assessment and Plan      Bilateral pulmonary embolism  - CT Chest w/ IV contrast shows multiple bilateral acute pulmonary emboli involving all 5 lobes   - TTE shows no evidence of RIGHT heart strain  - Continue with Lovenox 100mg SubQ BID  - Will need to be switched to NOAC before discharge    Extensive DVT in BL lower extremities; Acute  - Bilateral Common femoral, Femoral, Deep femoral, Popliteal, Posterior tibial, Gastrocnemius, and Peroneal veins thrombosed  - On therapeutic Lovenox     MRSA bacteremia; rule out bacterial endocarditis  - MRSA + culture upon admisson; now negative ( 25 and 30 Nov)  - On vancomycin 1250mg IV BID as per ID/ check vancomycin trough level today  -SAVANNAH negative for vegetations  - Continue vancomycin till 12/6/2018 as per ID      CVA; Acute  - CT head showed small infarct in the right frontal lobe and small lacunar infarcts within the basal ganglia and thalami, new since the prior exam  - MRI brain done: shows acute/subacute frontal lobe lacunar infarcts and subdural hematoma with no mass effect   - Hold atorvastatin due too increasing LFTs  - will call neurology for follow up   -neurosurgery eval to get their opinion if ok to get full anticoagulation for PE/DVT. and also if ok to add asa       One episode of Coffee ground emesis in ER; Resolved  - GI recommendations appreciated: Started on Protnix IV; switched to PO BID  - As per GI, AC benefit outweighs the risk of GI bleed at this time  - Recommend EGD when more stable  - Follow H+H     Transaminitis :   Persistently high transaminases  Lipitor on hold   Will do abdominal US and Hepatitis workup ( r/o Alcoholic liver disease vs hepatitis)    Acute renal failure; Resolved  - Continue to monitor  - Off IV hydration since yesterday    Seizure; Chronic  - Continue with Dilantin/Keppra    Hypertension; Chronic  - Controlled as inpatient    Thiamine deficiency; Chronic  - Continue Thiamine PO 100mg daily     Stage II sacral ulcer; Chronic  - No evidence of infection  - Continue daily wound care  - Off-loading protocol if patient not moving

## 2018-12-05 LAB
ALBUMIN SERPL ELPH-MCNC: 3 G/DL — LOW (ref 3.5–5.2)
ALP SERPL-CCNC: 116 U/L — HIGH (ref 30–115)
ALT FLD-CCNC: 63 U/L — HIGH (ref 0–41)
AST SERPL-CCNC: 59 U/L — HIGH (ref 0–41)
BILIRUB DIRECT SERPL-MCNC: <0.2 MG/DL — SIGNIFICANT CHANGE UP (ref 0–0.2)
BILIRUB INDIRECT FLD-MCNC: >0.1 MG/DL — LOW (ref 0.2–1.2)
BILIRUB SERPL-MCNC: 0.3 MG/DL — SIGNIFICANT CHANGE UP (ref 0.2–1.2)
CHOLEST SERPL-MCNC: 206 MG/DL — HIGH (ref 100–200)
HAV IGM SER-ACNC: SIGNIFICANT CHANGE UP
HBV CORE IGM SER-ACNC: SIGNIFICANT CHANGE UP
HBV SURFACE AG SER-ACNC: SIGNIFICANT CHANGE UP
HCV AB S/CO SERPL IA: 0.12 S/CO — SIGNIFICANT CHANGE UP
HCV AB SERPL-IMP: SIGNIFICANT CHANGE UP
HDLC SERPL-MCNC: 32 MG/DL — LOW
LIPID PNL WITH DIRECT LDL SERPL: 139 MG/DL — HIGH (ref 4–129)
PHENYTOIN FREE SERPL-MCNC: 9.5 UG/ML — LOW (ref 10–20)
PROT SERPL-MCNC: 5.5 G/DL — LOW (ref 6–8)
TOTAL CHOLESTEROL/HDL RATIO MEASUREMENT: 6.4 RATIO — HIGH (ref 4–5.5)
TRIGL SERPL-MCNC: 194 MG/DL — HIGH (ref 10–149)
VANCOMYCIN TROUGH SERPL-MCNC: 8.7 UG/ML — SIGNIFICANT CHANGE UP (ref 5–10)

## 2018-12-05 RX ORDER — ASPIRIN/CALCIUM CARB/MAGNESIUM 324 MG
81 TABLET ORAL DAILY
Qty: 0 | Refills: 0 | Status: DISCONTINUED | OUTPATIENT
Start: 2018-12-05 | End: 2018-12-05

## 2018-12-05 RX ORDER — APIXABAN 2.5 MG/1
5 TABLET, FILM COATED ORAL EVERY 12 HOURS
Qty: 0 | Refills: 0 | Status: DISCONTINUED | OUTPATIENT
Start: 2018-12-05 | End: 2018-12-13

## 2018-12-05 RX ADMIN — APIXABAN 5 MILLIGRAM(S): 2.5 TABLET, FILM COATED ORAL at 17:24

## 2018-12-05 RX ADMIN — Medication 100 MILLIGRAM(S): at 06:11

## 2018-12-05 RX ADMIN — PANTOPRAZOLE SODIUM 40 MILLIGRAM(S): 20 TABLET, DELAYED RELEASE ORAL at 06:11

## 2018-12-05 RX ADMIN — Medication 200 MILLIGRAM(S): at 17:24

## 2018-12-05 RX ADMIN — Medication 100 MILLIGRAM(S): at 12:50

## 2018-12-05 RX ADMIN — Medication 200 MILLIGRAM(S): at 23:49

## 2018-12-05 RX ADMIN — Medication 200 MILLIGRAM(S): at 06:11

## 2018-12-05 RX ADMIN — ENOXAPARIN SODIUM 100 MILLIGRAM(S): 100 INJECTION SUBCUTANEOUS at 06:11

## 2018-12-05 RX ADMIN — CHLORHEXIDINE GLUCONATE 1 APPLICATION(S): 213 SOLUTION TOPICAL at 06:10

## 2018-12-05 RX ADMIN — Medication 166.67 MILLIGRAM(S): at 17:25

## 2018-12-05 RX ADMIN — Medication 1 MILLIGRAM(S): at 12:51

## 2018-12-05 RX ADMIN — Medication 166.67 MILLIGRAM(S): at 06:12

## 2018-12-05 RX ADMIN — PANTOPRAZOLE SODIUM 40 MILLIGRAM(S): 20 TABLET, DELAYED RELEASE ORAL at 17:24

## 2018-12-05 RX ADMIN — SENNA PLUS 2 TABLET(S): 8.6 TABLET ORAL at 22:28

## 2018-12-05 RX ADMIN — Medication 100 MILLIGRAM(S): at 17:24

## 2018-12-05 RX ADMIN — LEVETIRACETAM 1500 MILLIGRAM(S): 250 TABLET, FILM COATED ORAL at 17:25

## 2018-12-05 RX ADMIN — Medication 200 MILLIGRAM(S): at 12:51

## 2018-12-05 RX ADMIN — LEVETIRACETAM 1500 MILLIGRAM(S): 250 TABLET, FILM COATED ORAL at 06:12

## 2018-12-05 NOTE — PROGRESS NOTE ADULT - ASSESSMENT
IMPRESSION:  ORSA uncomplicated bacteremia as BCx repeatedly negative and SAVANNAH with no vegetations  Sacral ulcer is not infected and is not the source  WBC 12.3  Bcx 11/30 NTD  SAVANNAH neg    RECOMMENDATIONS:  Vancomycin 1250 mg iv q12h fpr 14 days starting 11/24.  Once off ABx will recommend repeating BCx to document eradication of bacteremia  Repeat Vanco trough please and maintain between 15-20

## 2018-12-05 NOTE — PROGRESS NOTE ADULT - SUBJECTIVE AND OBJECTIVE BOX
LIN BENTON  58y, Male      OVERNIGHT EVENTS:    no fevers, has no complaints    VITALS:  T(F): 98.8, Max: 98.8 (12-05-18 @ 05:49)  HR: 95  BP: 142/73  RR: 18Vital Signs Last 24 Hrs  T(C): 37.1 (05 Dec 2018 05:49), Max: 37.1 (05 Dec 2018 05:49)  T(F): 98.8 (05 Dec 2018 05:49), Max: 98.8 (05 Dec 2018 05:49)  HR: 95 (05 Dec 2018 05:49) (94 - 95)  BP: 142/73 (05 Dec 2018 05:49) (131/76 - 142/73)  BP(mean): --  RR: 18 (05 Dec 2018 05:49) (18 - 19)  SpO2: 97% (04 Dec 2018 10:32) (97% - 97%)    TESTS & MEASUREMENTS:                        9.1    12.30 )-----------( 456      ( 04 Dec 2018 01:21 )             28.2     12-04    139  |  102  |  10  ----------------------------<  90  3.9   |  24  |  0.6<L>    Ca    8.4<L>      04 Dec 2018 01:21    TPro  5.3<L>  /  Alb  2.6<L>  /  TBili  0.3  /  DBili  x   /  AST  81<H>  /  ALT  75<H>  /  AlkPhos  119<H>  12-04    LIVER FUNCTIONS - ( 04 Dec 2018 01:21 )  Alb: 2.6 g/dL / Pro: 5.3 g/dL / ALK PHOS: 119 U/L / ALT: 75 U/L / AST: 81 U/L / GGT: x             Culture - Blood (collected 11-30-18 @ 19:09)  Source: .Blood None  Preliminary Report (12-02-18 @ 01:02):    No growth to date.            RADIOLOGY & ADDITIONAL TESTS:    ANTIBIOTICS:  vancomycin  IVPB 1250 milliGRAM(s) IV Intermittent every 12 hours  vancomycin  IVPB

## 2018-12-05 NOTE — PHYSICAL THERAPY INITIAL EVALUATION ADULT - GENERAL OBSERVATIONS, REHAB EVAL
14:23-14:30. Pt encountered semifowler in bed in NAD, O2 via NC, PT saw OT was off pt's nose and adjusted O2, pt got upset ,yelled out. PT explained that NC needs to be in nose. Pt states "get out, leave me alone".  Will f/u with PT as appropriate.
pt seen 5:15-5:45 pm, pt was cooperative but slow to follow commands

## 2018-12-05 NOTE — PROGRESS NOTE ADULT - SUBJECTIVE AND OBJECTIVE BOX
SUBJECTIVE:    Patient is a 58y old Male who presents with a chief complaint of sob and fall (05 Dec 2018 06:34)    Currently admitted to medicine with the primary diagnosis of Acute respiratory failure with hypoxia     Today is hospital day 12d. This morning he is resting comfortably in bed and reports no new issues or overnight events.     PAST MEDICAL & SURGICAL HISTORY  HLD (hyperlipidemia)  HTN (hypertension)  Seizures  Unilateral inguinal hernia with obstruction and without gangrene, recurrence not specified  No significant past surgical history    SOCIAL HISTORY:  Negative for smoking/alcohol/drug use.     ALLERGIES:  No Known Allergies    MEDICATIONS:  STANDING MEDICATIONS  apixaban 5 milliGRAM(s) Oral every 12 hours  chlorhexidine 4% Liquid 1 Application(s) Topical <User Schedule>  docusate sodium 100 milliGRAM(s) Oral two times a day  folic acid 1 milliGRAM(s) Oral daily  levETIRAcetam 1500 milliGRAM(s) Oral two times a day  pantoprazole    Tablet 40 milliGRAM(s) Oral two times a day  phenytoin   Capsule 200 milliGRAM(s) Oral four times a day  senna 2 Tablet(s) Oral at bedtime  thiamine 100 milliGRAM(s) Oral daily  vancomycin  IVPB 1250 milliGRAM(s) IV Intermittent every 12 hours  vancomycin  IVPB        PRN MEDICATIONS    VITALS:   T(F): 98.8  HR: 95  BP: 142/73  RR: 18  SpO2: --    LABS:                        9.1    12.30 )-----------( 456      ( 04 Dec 2018 01:21 )             28.2     12-04    139  |  102  |  10  ----------------------------<  90  3.9   |  24  |  0.6<L>    Ca    8.4<L>      04 Dec 2018 01:21    TPro  5.3<L>  /  Alb  2.6<L>  /  TBili  0.3  /  DBili  x   /  AST  81<H>  /  ALT  75<H>  /  AlkPhos  119<H>  12-04        PHYSICAL EXAM:  GEN: No acute distress  LUNGS: Clear to auscultation bilaterally   HEART: S1/S2 present. RRR.   ABD: Soft, non-tender, non-distended. Bowel sounds present  EXT: NC/NC/NE/2+PP/CHOW/Skin Intact.   NEURO: AAOX3    PHYSICAL EXAM:  GEN: No acute distress  LUNGS: Clear to auscultation bilaterally   CV: S1/S2 present. RRR.   GI: Soft, non-tender, non-distended. Bowel sounds present  EXT: NC/NC/NE/2+PP/CHOW/Skin Intact.   NEURO: AAOX3      Assessment and Plan      Bilateral pulmonary embolism  - CT Chest w/ IV contrast shows multiple bilateral acute pulmonary emboli involving all 5 lobes   - TTE shows no evidence of RIGHT heart strain  - Will switch to Eliquis 5 mg BID    Extensive DVT in BL lower extremities  - Bilateral Common femoral, Femoral, Deep femoral, Popliteal, Posterior tibial, Gastrocnemius, and Peroneal veins thrombosed    CVA  - MRI brain done yesterday showing acute/subacute frontal lobe lacunar infarcts and subdural hematoma with no mass effect   - CT head upon admission showed small infarct in the right frontal lobe and small lacunar infarcts within the basal ganglia and thalami  - Neurosurgery recommending continuing anticoagulation and no acute intervention  - Neurology recommending MRA head and neck to r/o atherosclerotic disease ( may start Aspirin afterwards)  - Atorvastatin due too increasing LFTs      MRSA bacteremia  - MRSA + culture upon admisson; now negative ( 25 and 30 Nov)  - On vancomycin 1250mg IV BID as per ID/ check vancomycin trough level today  -SAVANNAH negative for vegetations  - Continue vancomycin till 12/7/2018 as per ID      One episode of Coffee ground emesis in ER; Resolved  - GI recommendations appreciated: Started on Protnix IV; switched to PO BID  - As per GI, AC benefit outweighs the risk of GI bleed at this time  - Recommend EGD when more stable  - Follow H+H     Transaminitis   Persistently high transaminases  Lipitor on hold   Pending abdominal US; Hepatitis workup negative    Acute renal failure; Resolved  - Continue to monitor  - Off IV hydration since yesterday    Seizure  - Continue with Dilantin/Keppra  -Check Dilantin Level    Hypertension; Chronic  - Controlled as inpatient    Thiamine deficiency; Chronic  - Continue Thiamine PO 100mg daily     Stage II sacral ulcer; Chronic  - No evidence of infection  - Continue daily wound care  - Off-loading protocol if patient not moving SUBJECTIVE:    Patient is a 58y old Male who presents with a chief complaint of sob and fall (05 Dec 2018 06:34)    Currently admitted to medicine with the primary diagnosis of Acute respiratory failure with hypoxia     Today is hospital day 12d. This morning he is resting comfortably in bed and reports no new issues or overnight events.     PAST MEDICAL & SURGICAL HISTORY  HLD (hyperlipidemia)  HTN (hypertension)  Seizures  Unilateral inguinal hernia with obstruction and without gangrene, recurrence not specified  No significant past surgical history    SOCIAL HISTORY:  Negative for smoking/alcohol/drug use.     ALLERGIES:  No Known Allergies    MEDICATIONS:  STANDING MEDICATIONS  apixaban 5 milliGRAM(s) Oral every 12 hours  chlorhexidine 4% Liquid 1 Application(s) Topical <User Schedule>  docusate sodium 100 milliGRAM(s) Oral two times a day  folic acid 1 milliGRAM(s) Oral daily  levETIRAcetam 1500 milliGRAM(s) Oral two times a day  pantoprazole    Tablet 40 milliGRAM(s) Oral two times a day  phenytoin   Capsule 200 milliGRAM(s) Oral four times a day  senna 2 Tablet(s) Oral at bedtime  thiamine 100 milliGRAM(s) Oral daily  vancomycin  IVPB 1250 milliGRAM(s) IV Intermittent every 12 hours  vancomycin  IVPB        PRN MEDICATIONS    VITALS:   T(F): 98.8  HR: 95  BP: 142/73  RR: 18  SpO2: --    LABS:                        9.1    12.30 )-----------( 456      ( 04 Dec 2018 01:21 )             28.2     12-04    139  |  102  |  10  ----------------------------<  90  3.9   |  24  |  0.6<L>    Ca    8.4<L>      04 Dec 2018 01:21    TPro  5.3<L>  /  Alb  2.6<L>  /  TBili  0.3  /  DBili  x   /  AST  81<H>  /  ALT  75<H>  /  AlkPhos  119<H>  12-04        PHYSICAL EXAM:  GEN: No acute distress  LUNGS: Clear to auscultation bilaterally   HEART: S1/S2 present. RRR.   ABD: Soft, non-tender, non-distended. Bowel sounds present  EXT: NC/NC/NE/2+PP/CHOW/Skin Intact.   NEURO: AAOX3    PHYSICAL EXAM:  GEN: No acute distress  LUNGS: Clear to auscultation bilaterally   CV: S1/S2 present. RRR.   GI: Soft, non-tender, non-distended. Bowel sounds present  EXT: NC/NC/NE/2+PP/CHOW/Skin Intact.   NEURO: AAOX3      Assessment and Plan      Bilateral pulmonary embolism  - CT Chest w/ IV contrast shows multiple bilateral acute pulmonary emboli involving all 5 lobes   - TTE shows no evidence of RIGHT heart strain  - Will switch to Eliquis 5 mg BID    Extensive DVT in BL lower extremities  - Bilateral Common femoral, Femoral, Deep femoral, Popliteal, Posterior tibial, Gastrocnemius, and Peroneal veins thrombosed    CVA  - MRI brain done yesterday showing acute/subacute frontal lobe lacunar infarcts and subdural hematoma with no mass effect   - CT head upon admission showed small infarct in the right frontal lobe and small lacunar infarcts within the basal ganglia and thalami  - Neurosurgery recommending continuing anticoagulation and no acute intervention  - Neurology recommending MRA head and neck to r/o atherosclerotic disease ( may start Aspirin afterwards)  - Atorvastatin due too increasing LFTs      MRSA bacteremia  - MRSA + culture upon admisson; now negative ( 25 and 30 Nov)  - On vancomycin 1250mg IV BID as per ID/ check vancomycin trough level today  -SAVANNAH negative for vegetations  - Continue vancomycin till 12/7/2018 as per ID then repeat blood cultures once off abx       One episode of Coffee ground emesis in ER; Resolved  - GI recommendations appreciated: Started on Protnix IV; switched to PO BID  - As per GI, AC benefit outweighs the risk of GI bleed at this time  - Recommend EGD when more stable  - Follow H+H     Transaminitis   Persistently high transaminases  Lipitor on hold   Pending abdominal US; Hepatitis workup negative    Acute renal failure; Resolved  - Continue to monitor  - Off IV hydration since yesterday    Seizure  - Continue with Dilantin/Keppra  -Check Dilantin Level    Hypertension; Chronic  - Controlled as inpatient    Thiamine deficiency; Chronic  - Continue Thiamine PO 100mg daily     Stage II sacral ulcer; Chronic  - No evidence of infection  - Continue daily wound care  - Off-loading protocol if patient not moving

## 2018-12-06 LAB
CULTURE RESULTS: SIGNIFICANT CHANGE UP
ESTIMATED AVERAGE GLUCOSE: 103 MG/DL — SIGNIFICANT CHANGE UP (ref 68–114)
HBA1C BLD-MCNC: 5.2 % — SIGNIFICANT CHANGE UP (ref 4–5.6)
SPECIMEN SOURCE: SIGNIFICANT CHANGE UP

## 2018-12-06 RX ORDER — VANCOMYCIN HCL 1 G
1500 VIAL (EA) INTRAVENOUS EVERY 12 HOURS
Qty: 0 | Refills: 0 | Status: DISCONTINUED | OUTPATIENT
Start: 2018-12-06 | End: 2018-12-07

## 2018-12-06 RX ORDER — VANCOMYCIN HCL 1 G
1250 VIAL (EA) INTRAVENOUS EVERY 8 HOURS
Qty: 0 | Refills: 0 | Status: DISCONTINUED | OUTPATIENT
Start: 2018-12-06 | End: 2018-12-06

## 2018-12-06 RX ADMIN — Medication 100 MILLIGRAM(S): at 11:58

## 2018-12-06 RX ADMIN — Medication 100 MILLIGRAM(S): at 05:24

## 2018-12-06 RX ADMIN — Medication 200 MILLIGRAM(S): at 05:24

## 2018-12-06 RX ADMIN — Medication 1 MILLIGRAM(S): at 11:58

## 2018-12-06 RX ADMIN — APIXABAN 5 MILLIGRAM(S): 2.5 TABLET, FILM COATED ORAL at 17:44

## 2018-12-06 RX ADMIN — Medication 166.67 MILLIGRAM(S): at 05:23

## 2018-12-06 RX ADMIN — LEVETIRACETAM 750 MILLIGRAM(S): 250 TABLET, FILM COATED ORAL at 17:44

## 2018-12-06 RX ADMIN — CHLORHEXIDINE GLUCONATE 1 APPLICATION(S): 213 SOLUTION TOPICAL at 05:23

## 2018-12-06 RX ADMIN — LEVETIRACETAM 1500 MILLIGRAM(S): 250 TABLET, FILM COATED ORAL at 05:25

## 2018-12-06 RX ADMIN — Medication 100 MILLIGRAM(S): at 17:44

## 2018-12-06 RX ADMIN — APIXABAN 5 MILLIGRAM(S): 2.5 TABLET, FILM COATED ORAL at 05:25

## 2018-12-06 RX ADMIN — Medication 200 MILLIGRAM(S): at 17:44

## 2018-12-06 RX ADMIN — PANTOPRAZOLE SODIUM 40 MILLIGRAM(S): 20 TABLET, DELAYED RELEASE ORAL at 17:46

## 2018-12-06 RX ADMIN — PANTOPRAZOLE SODIUM 40 MILLIGRAM(S): 20 TABLET, DELAYED RELEASE ORAL at 06:03

## 2018-12-06 RX ADMIN — Medication 200 MILLIGRAM(S): at 11:58

## 2018-12-06 NOTE — PROGRESS NOTE ADULT - ASSESSMENT
Bilateral pulmonary embolism  - CT Chest w/ IV contrast shows multiple bilateral acute pulmonary emboli involving all 5 lobes   - TTE shows no evidence of RIGHT heart strain  - on Eliquis 5 mg BID has SDH on MRI but neuro surgery cleared patient     Extensive DVT in BL lower extremities  on eliquis     CVA  MRA pending   MRI brain  showing acute/subacute frontal lobe lacunar infarcts and subdural hematoma with no mass effect     MRSA bacteremia  Continue vancomycin till 12/7/2018 as per ID then repeat blood cultures once off abx     One episode of Coffee ground emesis in ER; Resolved:   GI was planning on EGD once patient was stable. he is not actively bleeding and has been tolerating Anticoagulation for over a week now. will discuss with GI       Transaminitis   improving   follow US     Acute renal failure; Resolved    Seizure  Continue with Dilantin/Keppra      Hypertension; Chronic  - Controlled as inpatient    Thiamine deficiency; Chronic  - Continue Thiamine PO 100mg daily     Stage II sacral ulcer; Chronic  No evidence of infection  Continue daily wound care  Off-loading protocol if patient not moving

## 2018-12-06 NOTE — PROGRESS NOTE ADULT - ASSESSMENT
IMPRESSION:  ORSA uncomplicated bacteremia as BCx repeatedly negative and SAVANNAH with no vegetations  Sacral ulcer is not infected and is not the source  WBC 12.3  Bcx 11/30 NTD  SAVANNAH neg  CT head results noted  Vanco 8.7    RECOMMENDATIONS:  Increase Vancomycin 1500 mg iv q12h for 14 days starting 11/24.  Once off ABx will recommend repeating BCx to document eradication of bacteremia

## 2018-12-06 NOTE — PROVIDER CONTACT NOTE (OTHER) - SITUATION
Pt is on IV abx for bacteremia, educated on the need for iv access. Pt refused access and began swinging arms.

## 2018-12-06 NOTE — PROGRESS NOTE ADULT - SUBJECTIVE AND OBJECTIVE BOX
CC and HPI : patient has no complaints this am. refused MRA yesterday     Vital Signs Last 24 Hrs  T(C): 37 (06 Dec 2018 05:00), Max: 37 (06 Dec 2018 05:00)  T(F): 98.6 (06 Dec 2018 05:00), Max: 98.6 (06 Dec 2018 05:00)  HR: 98 (06 Dec 2018 05:00) (94 - 98)  BP: 156/67 (06 Dec 2018 05:00) (103/58 - 156/67)  BP(mean): 88 (05 Dec 2018 20:30) (88 - 88)  RR: 18 (06 Dec 2018 05:00) (18 - 18)  SpO2: 97% (06 Dec 2018 06:48) (97% - 97%)    PHYSICAL EXAM:  GENERAL: NAD,   HEAD:  Atraumatic, Normocephalic  EYES: EOMI, PERRLA, conjunctiva and sclera clear  NECK: Supple, No JVD  Pulm: Clear to auscultation bilaterally; No wheeze  CV: Regular rate and rhythm; No murmurs, rubs, or gallops  GI: Soft, Nontender, Nondistended; Bowel sounds present  EXTREMITIES:  2+ Peripheral Pulses, No clubbing, cyanosis, or edema  PSYCH: AAOx3  NEUROLOGY: non-focal  SKIN: No rashes or lesions          TPro  5.5<L>  /  Alb  3.0<L>  /  TBili  0.3  /  DBili  <0.2  /  AST  59<H>  /  ALT  63<H>  /  AlkPhos  116<H>  12-05    LIVER FUNCTIONS - ( 05 Dec 2018 18:21 )  Alb: 3.0 g/dL / Pro: 5.5 g/dL / ALK PHOS: 116 U/L / ALT: 63 U/L / AST: 59 U/L / GGT: x

## 2018-12-06 NOTE — PROGRESS NOTE ADULT - SUBJECTIVE AND OBJECTIVE BOX
SUBJECTIVE:    Patient is a 58y old Male who presents with a chief complaint of sob and fall (05 Dec 2018 06:34)    Currently admitted to medicine with the primary diagnosis of Acute respiratory failure with hypoxia     Today is hospital day 12d. This morning he is resting comfortably in bed and reports no new issues or overnight events.     PAST MEDICAL & SURGICAL HISTORY  HLD (hyperlipidemia)  HTN (hypertension)  Seizures  Unilateral inguinal hernia with obstruction and without gangrene, recurrence not specified  No significant past surgical history    SOCIAL HISTORY:  Negative for smoking/alcohol/drug use.     ALLERGIES:  No Known Allergies    MEDICATIONS:  STANDING MEDICATIONS  apixaban 5 milliGRAM(s) Oral every 12 hours  chlorhexidine 4% Liquid 1 Application(s) Topical <User Schedule>  docusate sodium 100 milliGRAM(s) Oral two times a day  folic acid 1 milliGRAM(s) Oral daily  levETIRAcetam 1500 milliGRAM(s) Oral two times a day  pantoprazole    Tablet 40 milliGRAM(s) Oral two times a day  phenytoin   Capsule 200 milliGRAM(s) Oral four times a day  senna 2 Tablet(s) Oral at bedtime  thiamine 100 milliGRAM(s) Oral daily  vancomycin  IVPB 1250 milliGRAM(s) IV Intermittent every 12 hours  vancomycin  IVPB        PRN MEDICATIONS    VITALS:   T(F): 98.8  HR: 95  BP: 142/73  RR: 18  SpO2: --    LABS:                        9.1    12.30 )-----------( 456      ( 04 Dec 2018 01:21 )             28.2     12-04    139  |  102  |  10  ----------------------------<  90  3.9   |  24  |  0.6<L>    Ca    8.4<L>      04 Dec 2018 01:21    TPro  5.3<L>  /  Alb  2.6<L>  /  TBili  0.3  /  DBili  x   /  AST  81<H>  /  ALT  75<H>  /  AlkPhos  119<H>  12-04        PHYSICAL EXAM:  GEN: No acute distress  LUNGS: Clear to auscultation bilaterally   HEART: S1/S2 present. RRR.   ABD: Soft, non-tender, non-distended. Bowel sounds present  EXT: NC/NC/NE/2+PP/CHOW/Skin Intact.   NEURO: AAOX3    PHYSICAL EXAM:  GEN: No acute distress  LUNGS: Clear to auscultation bilaterally   CV: S1/S2 present. RRR.   GI: Soft, non-tender, non-distended. Bowel sounds present  EXT: NC/NC/NE/2+PP/CHOW/Skin Intact.   NEURO: AAOX3      Assessment and Plan      Bilateral pulmonary embolism  - CT Chest w/ IV contrast shows multiple bilateral acute pulmonary emboli involving all 5 lobes   - TTE shows no evidence of RIGHT heart strain  - Switched to Eliquis 5 mg BID    Extensive DVT in BL lower extremities  - Bilateral Common femoral, Femoral, Deep femoral, Popliteal, Posterior tibial, Gastrocnemius, and Peroneal veins thrombosed    CVA  - MRI brain showing acute/subacute frontal lobe lacunar infarcts and subdural hematoma with no mass effect   - CT head upon admission showed small infarct in the right frontal lobe and small lacunar infarcts within the basal ganglia and thalami  - Neurosurgery recommending continuing anticoagulation and no acute intervention  - Neurology recommending MRA head and neck to r/o atherosclerotic disease ( may start Aspirin afterwards)- patient refusing  - Atorvastatin on hold due too increasing LFTs      MRSA bacteremia  - MRSA + culture upon admisson; now negative ( 25 and 30 Nov)  - On vancomycin 1500mg IV BID as per ID  -SAVANNAH negative for vegetations  - Continue vancomycin till 12/7/2018 as per ID then repeat blood cultures once off abx       One episode of Coffee ground emesis in ER; Resolved  - GI recommendations appreciated: Started on Protnix IV; switched to PO BID  - As per GI, AC benefit outweighs the risk of GI bleed at this time  - Recommend EGD when more stable  - Follow H+H     Transaminitis   Persistently high transaminases  Lipitor on hold   nonsignificant abdominal US; Hepatitis workup negative    Acute renal failure; Resolved      Seizure  - Continue with Dilantin/Keppra    Hypertension; Chronic  - Controlled as inpatient    Thiamine deficiency; Chronic  - Continue Thiamine PO 100mg daily     Stage II sacral ulcer; Chronic  - No evidence of infection  - Continue daily wound care  - Off-loading protocol if patient not moving

## 2018-12-06 NOTE — PROGRESS NOTE ADULT - SUBJECTIVE AND OBJECTIVE BOX
SERENITY LIN  58y, Male      OVERNIGHT EVENTS:    no fevers, has no complaints    VITALS:  T(F): 98, Max: 98 (12-05-18 @ 13:03)  HR: 98  BP: 156/67  RR: 18Vital Signs Last 24 Hrs  T(C): 36.7 (05 Dec 2018 20:30), Max: 36.7 (05 Dec 2018 13:03)  T(F): 98 (05 Dec 2018 20:30), Max: 98 (05 Dec 2018 13:03)  HR: 98 (06 Dec 2018 05:00) (94 - 98)  BP: 156/67 (06 Dec 2018 05:00) (103/58 - 156/67)  BP(mean): 88 (05 Dec 2018 20:30) (88 - 88)  RR: 18 (06 Dec 2018 05:00) (18 - 18)  SpO2: --    TESTS & MEASUREMENTS:        TPro  5.5<L>  /  Alb  3.0<L>  /  TBili  0.3  /  DBili  <0.2  /  AST  59<H>  /  ALT  63<H>  /  AlkPhos  116<H>  12-05    LIVER FUNCTIONS - ( 05 Dec 2018 18:21 )  Alb: 3.0 g/dL / Pro: 5.5 g/dL / ALK PHOS: 116 U/L / ALT: 63 U/L / AST: 59 U/L / GGT: x             Culture - Blood (collected 11-30-18 @ 19:09)  Source: .Blood None  Final Report (12-06-18 @ 01:01):    No growth at 5 days.            RADIOLOGY & ADDITIONAL TESTS:    ANTIBIOTICS:  vancomycin  IVPB 1250 milliGRAM(s) IV Intermittent every 8 hours

## 2018-12-06 NOTE — PROVIDER CONTACT NOTE (OTHER) - ACTION/TREATMENT ORDERED:
MD Harry made aware and instructed to start IVF: NS @75
No new orders at this time.
no acute interventions at this time

## 2018-12-06 NOTE — CHART NOTE - NSCHARTNOTEFT_GEN_A_CORE
Got called by Rn that the patient is refusing IV line.     Went in to assess the pt. explained to the patient the need to get Iv antibiotics to complete the abx course. Pt still refusing the IV line.

## 2018-12-07 RX ORDER — VANCOMYCIN HCL 1 G
1500 VIAL (EA) INTRAVENOUS ONCE
Qty: 0 | Refills: 0 | Status: COMPLETED | OUTPATIENT
Start: 2018-12-07 | End: 2018-12-07

## 2018-12-07 RX ORDER — ATORVASTATIN CALCIUM 80 MG/1
10 TABLET, FILM COATED ORAL AT BEDTIME
Qty: 0 | Refills: 0 | Status: DISCONTINUED | OUTPATIENT
Start: 2018-12-07 | End: 2018-12-08

## 2018-12-07 RX ADMIN — Medication 300 MILLIGRAM(S): at 22:25

## 2018-12-07 RX ADMIN — Medication 100 MILLIGRAM(S): at 17:43

## 2018-12-07 RX ADMIN — Medication 200 MILLIGRAM(S): at 11:23

## 2018-12-07 RX ADMIN — Medication 200 MILLIGRAM(S): at 17:43

## 2018-12-07 RX ADMIN — SENNA PLUS 2 TABLET(S): 8.6 TABLET ORAL at 22:25

## 2018-12-07 RX ADMIN — Medication 100 MILLIGRAM(S): at 06:17

## 2018-12-07 RX ADMIN — LEVETIRACETAM 1500 MILLIGRAM(S): 250 TABLET, FILM COATED ORAL at 17:43

## 2018-12-07 RX ADMIN — PANTOPRAZOLE SODIUM 40 MILLIGRAM(S): 20 TABLET, DELAYED RELEASE ORAL at 06:17

## 2018-12-07 RX ADMIN — APIXABAN 5 MILLIGRAM(S): 2.5 TABLET, FILM COATED ORAL at 06:16

## 2018-12-07 RX ADMIN — Medication 200 MILLIGRAM(S): at 06:17

## 2018-12-07 RX ADMIN — PANTOPRAZOLE SODIUM 40 MILLIGRAM(S): 20 TABLET, DELAYED RELEASE ORAL at 17:43

## 2018-12-07 RX ADMIN — LEVETIRACETAM 1500 MILLIGRAM(S): 250 TABLET, FILM COATED ORAL at 06:16

## 2018-12-07 RX ADMIN — Medication 200 MILLIGRAM(S): at 00:02

## 2018-12-07 RX ADMIN — Medication 100 MILLIGRAM(S): at 11:23

## 2018-12-07 RX ADMIN — APIXABAN 5 MILLIGRAM(S): 2.5 TABLET, FILM COATED ORAL at 17:43

## 2018-12-07 RX ADMIN — ATORVASTATIN CALCIUM 10 MILLIGRAM(S): 80 TABLET, FILM COATED ORAL at 22:25

## 2018-12-07 RX ADMIN — Medication 1 MILLIGRAM(S): at 11:23

## 2018-12-07 RX ADMIN — SENNA PLUS 2 TABLET(S): 8.6 TABLET ORAL at 00:01

## 2018-12-07 RX ADMIN — Medication 300 MILLIGRAM(S): at 15:25

## 2018-12-07 RX ADMIN — CHLORHEXIDINE GLUCONATE 1 APPLICATION(S): 213 SOLUTION TOPICAL at 06:16

## 2018-12-07 NOTE — PROGRESS NOTE ADULT - GUM GEN PE MLT EXAM PC
Normal genitalia; no lesions; no discharge
detailed exam
Normal genitalia; no lesions; no discharge

## 2018-12-07 NOTE — PROGRESS NOTE ADULT - SUBJECTIVE AND OBJECTIVE BOX
SERENITY LIN  58y, Male      OVERNIGHT EVENTS:    none    VITALS:  T(F): 96.7, Max: 97.9 (12-06-18 @ 12:31)  HR: 87  BP: 136/73  RR: 18Vital Signs Last 24 Hrs  T(C): 35.9 (06 Dec 2018 21:33), Max: 36.6 (06 Dec 2018 12:31)  T(F): 96.7 (06 Dec 2018 21:33), Max: 97.9 (06 Dec 2018 12:31)  HR: 87 (06 Dec 2018 21:33) (87 - 97)  BP: 136/73 (06 Dec 2018 21:33) (121/66 - 136/73)  BP(mean): --  RR: 18 (06 Dec 2018 21:33) (17 - 18)  SpO2: 97% (06 Dec 2018 06:48) (97% - 97%)    TESTS & MEASUREMENTS:        TPro  5.5<L>  /  Alb  3.0<L>  /  TBili  0.3  /  DBili  <0.2  /  AST  59<H>  /  ALT  63<H>  /  AlkPhos  116<H>  12-05    LIVER FUNCTIONS - ( 05 Dec 2018 18:21 )  Alb: 3.0 g/dL / Pro: 5.5 g/dL / ALK PHOS: 116 U/L / ALT: 63 U/L / AST: 59 U/L / GGT: x             Culture - Blood (collected 11-30-18 @ 19:09)  Source: .Blood None  Final Report (12-06-18 @ 01:01):    No growth at 5 days.            RADIOLOGY & ADDITIONAL TESTS:    ANTIBIOTICS:  vancomycin  IVPB 1500 milliGRAM(s) IV Intermittent every 12 hours

## 2018-12-07 NOTE — PROGRESS NOTE ADULT - GASTROINTESTINAL
Soft, non-tender, no hepatosplenomegaly, normal bowel sounds
detailed exam
Soft, non-tender, no hepatosplenomegaly, normal bowel sounds
Soft, non-tender, no hepatosplenomegaly, normal bowel sounds
detailed exam
detailed exam
Soft, non-tender, no hepatosplenomegaly, normal bowel sounds
detailed exam

## 2018-12-07 NOTE — PROGRESS NOTE ADULT - ASSESSMENT
IMPRESSION:  ORSA uncomplicated bacteremia as BCx repeatedly negative and SAVANNAH with no vegetations  Sacral ulcer is not infected and is not the source  WBC 12.3  Bcx 11/30 NTD  SAVANNAH neg  CT head results noted  Vanco 8.7    RECOMMENDATIONS:   Vancomycin 1500 mg iv q12h for 14 days starting 11/24.  Repeat Vanco level  Once off ABx will recommend repeating BCx to document eradication of bacteremia  Recall prn please

## 2018-12-07 NOTE — PROGRESS NOTE ADULT - RESPIRATORY
Breath Sounds equal & clear to percussion & auscultation, no accessory muscle use
detailed exam

## 2018-12-07 NOTE — PROGRESS NOTE ADULT - SUBJECTIVE AND OBJECTIVE BOX
patient refused vancomycin last night and this morning   vanco level was low on 12/5 he also refuses blood work most of the time   also was supposed to get MRA head and neck to complete stroke work up   now he agrees to take vanco and agress on MRA     Vital Signs Last 24 Hrs  T(C): 36.6 (07 Dec 2018 07:01), Max: 36.6 (07 Dec 2018 07:01)  T(F): 97.9 (07 Dec 2018 07:01), Max: 97.9 (07 Dec 2018 07:01)  HR: 88 (07 Dec 2018 07:01) (87 - 88)  BP: 139/82 (07 Dec 2018 07:01) (136/73 - 139/82)  BP(mean): --  RR: 18 (07 Dec 2018 07:01) (18 - 18)  SpO2: --    PHYSICAL EXAM:  GENERAL: NAD, well-developed  HEAD:  Atraumatic, Normocephalic  EYES: EOMI, PERRLA, conjunctiva and sclera clear  NECK: Supple, No JVD  Pulm: Clear to auscultation bilaterally; No wheeze  CV: Regular rate and rhythm; No murmurs, rubs, or gallops  GI: Soft, Nontender, Nondistended; Bowel sounds present  EXTREMITIES:  2+ Peripheral Pulses, No clubbing, cyanosis, or edema  PSYCH: AAOx2  NEUROLOGY: non-focal  SKIN: No rashes or lesions          TPro  5.5<L>  /  Alb  3.0<L>  /  TBili  0.3  /  DBili  <0.2  /  AST  59<H>  /  ALT  63<H>  /  AlkPhos  116<H>  12-05    LIVER FUNCTIONS - ( 05 Dec 2018 18:21 )  Alb: 3.0 g/dL / Pro: 5.5 g/dL / ALK PHOS: 116 U/L / ALT: 63 U/L / AST: 59 U/L / GGT: x

## 2018-12-07 NOTE — PROGRESS NOTE ADULT - ASSESSMENT
Bilateral pulmonary embolism  - CT Chest w/ IV contrast shows multiple bilateral acute pulmonary emboli involving all 5 lobes   - TTE shows no evidence of RIGHT heart strain  - on Eliquis 5 mg BID has SDH on MRI but neuro surgery cleared patient     Extensive DVT in BL lower extremities  on eliquis     CVA  will reorder MRA ow patient agrees per neuro asa to be started if MRA shows atherosclerotic changes     MRI brain  showing acute/subacute frontal lobe lacunar infarcts and subdural hematoma with no mass effect     MRSA bacteremia  SAVANNAH neg for endocarditis   IV vanco 1500 BID refused last night and this am   will give dose now patient agrees   also 8 hours later will get second dose   today is last day of abx   per ID recs repeat blood cultures once off IV vanco so that should be tomorrow to confirm eradication of bacteremia. will need 2 sets     One episode of ??Coffee ground emesis in ER; Resolved:   tolerated Anticoagulation h and h stable   spoke to GI and no further intervention   c/w PPI and OPT follow up with GI       Transaminitis   improving   US normal liver   start lipitor low dose for stroke and monitor     Acute renal failure; Resolved    Seizure  Continue with Dilantin/Keppra      Hypertension; controlled     Thiamine deficiency; Chronic  - Continue Thiamine PO 100mg daily     Stage II sacral ulcer; Chronic  No evidence of infection  Continue daily wound care  Off-loading protocol if patient not moving    discharge once bacteremia is confirmed eradication so will need blood cultures tomorrow also pending MRA head and neck.

## 2018-12-07 NOTE — PROGRESS NOTE ADULT - CARDIOVASCULAR
Regular rate & rhythm, normal S1, S2; no murmurs, gallops or rubs; no S3, S4
Regular rate & rhythm, normal S1, S2; no murmurs, gallops or rubs; no S3, S4
detailed exam
Regular rate & rhythm, normal S1, S2; no murmurs, gallops or rubs; no S3, S4
detailed exam
detailed exam

## 2018-12-07 NOTE — PROGRESS NOTE ADULT - ASSESSMENT
# MRSA bacteremia:   - MRSA + culture upon admission now negative ( 25 and 30 Nov)  - last day of IV vancomycin. will get AM blood cultures , per ID will need a negative blood culture result before discharging the patient   - SAVANNAH negative for vegetations    # Bilateral pulmonary embolism / Extensive DVT in BL lower extremities:   - CT Chest w/ IV contrast shows multiple bilateral acute pulmonary emboli involving all 5 lobes   - TTE shows no evidence of RIGHT heart strain  - on Eliquis 5 mg BID    # CVA:   - Neurology recommending MRA head and neck to r/o atherosclerotic disease ( may start Aspirin afterwards)  - MRI brain showing acute/subacute frontal lobe lacunar infarcts and subdural hematoma with no mass effect   - CT head upon admission showed small infarct in the right frontal lobe and small lacunar infarcts within the basal ganglia and thalami  - Neurosurgery recommending continuing anticoagulation and no acute intervention    # One episode of Coffee ground emesis in ER; Resolved  - GI recommendations appreciated: Started on Protnix IV; switched to PO BID  - As per GI, AC benefit outweighs the risk of GI bleed at this time  - no intervention at this time    # Transaminitis:    - trending down  - Lipitor 10 mg PO daily started  - nonsignificant abdominal US; Hepatitis workup negative    # Seizure:  - Continue with Dilantin/Keppra    # Hypertension; Chronic  - Controlled as inpatient    # Thiamine deficiency; Chronic  - Continue Thiamine PO 100mg daily     # Stage II sacral ulcer; Chronic  - No evidence of infection  - Continue daily wound care  - Off-loading protocol      Disposition: to snf once medically cleared

## 2018-12-07 NOTE — PROGRESS NOTE ADULT - SUBJECTIVE AND OBJECTIVE BOX
SUBJECTIVE:    Patient is a 58y old Male who presents with a chief complaint of sob and fall (07 Dec 2018 12:41)  Currently admitted to medicine with the primary diagnosis of Acute respiratory failure with hypoxia  Today is hospital day 14d. This morning he is resting comfortably in bed.  over the night pt refused IV placement , he is on IV vanco for MRSA Bacteremia.  pt agreed to have IV placed this AM.    PAST MEDICAL & SURGICAL HISTORY  HLD (hyperlipidemia)  HTN (hypertension)  Seizures  Unilateral inguinal hernia with obstruction and without gangrene, recurrence not specified  No significant past surgical history    SOCIAL HISTORY:  Negative for smoking/alcohol/drug use.     ALLERGIES:  No Known Allergies    MEDICATIONS:  STANDING MEDICATIONS  apixaban 5 milliGRAM(s) Oral every 12 hours  atorvastatin 10 milliGRAM(s) Oral at bedtime  chlorhexidine 4% Liquid 1 Application(s) Topical <User Schedule>  docusate sodium 100 milliGRAM(s) Oral two times a day  folic acid 1 milliGRAM(s) Oral daily  levETIRAcetam 1500 milliGRAM(s) Oral two times a day  LORazepam   Injectable 1 milliGRAM(s) IV Push once  pantoprazole    Tablet 40 milliGRAM(s) Oral two times a day  phenytoin   Capsule 200 milliGRAM(s) Oral four times a day  senna 2 Tablet(s) Oral at bedtime  thiamine 100 milliGRAM(s) Oral daily  vancomycin  IVPB 1500 milliGRAM(s) IV Intermittent once  vancomycin  IVPB 1500 milliGRAM(s) IV Intermittent once    PRN MEDICATIONS    VITALS:   T(F): 97.9  HR: 88  BP: 139/82  RR: 18  SpO2: --    LABS:        TPro  5.5<L>  /  Alb  3.0<L>  /  TBili  0.3  /  DBili  <0.2  /  AST  59<H>  /  ALT  63<H>  /  AlkPhos  116<H>  12-05    RADIOLOGY:    < from: US Abdomen Limited (12.06.18 @ 10:03) >  IMPRESSION:    Right pleural effusion and right renal cyst.    Otherwise unremarkable abdominal ultrasound, without sonographic   correlate for transaminitis.    < end of copied text >      PHYSICAL EXAM:  GEN: No acute distress  LUNGS: Clear to auscultation bilaterally   HEART: S1/S2 present.   ABD: Soft, non-tender, non-distended. Bowel sounds present  EXT: NC/NC/NE/2+PP  NEURO: AAOX3

## 2018-12-08 LAB
ANION GAP SERPL CALC-SCNC: 15 MMOL/L — HIGH (ref 7–14)
BUN SERPL-MCNC: 5 MG/DL — LOW (ref 10–20)
CALCIUM SERPL-MCNC: 8.9 MG/DL — SIGNIFICANT CHANGE UP (ref 8.5–10.1)
CHLORIDE SERPL-SCNC: 100 MMOL/L — SIGNIFICANT CHANGE UP (ref 98–110)
CO2 SERPL-SCNC: 24 MMOL/L — SIGNIFICANT CHANGE UP (ref 17–32)
CREAT SERPL-MCNC: 0.6 MG/DL — LOW (ref 0.7–1.5)
GLUCOSE SERPL-MCNC: 91 MG/DL — SIGNIFICANT CHANGE UP (ref 70–99)
HCT VFR BLD CALC: 28 % — LOW (ref 42–52)
HGB BLD-MCNC: 9.1 G/DL — LOW (ref 14–18)
MCHC RBC-ENTMCNC: 29.7 PG — SIGNIFICANT CHANGE UP (ref 27–31)
MCHC RBC-ENTMCNC: 32.5 G/DL — SIGNIFICANT CHANGE UP (ref 32–37)
MCV RBC AUTO: 91.5 FL — SIGNIFICANT CHANGE UP (ref 80–94)
NRBC # BLD: 0 /100 WBCS — SIGNIFICANT CHANGE UP (ref 0–0)
PLATELET # BLD AUTO: 279 K/UL — SIGNIFICANT CHANGE UP (ref 130–400)
POTASSIUM SERPL-MCNC: 3.8 MMOL/L — SIGNIFICANT CHANGE UP (ref 3.5–5)
POTASSIUM SERPL-SCNC: 3.8 MMOL/L — SIGNIFICANT CHANGE UP (ref 3.5–5)
RBC # BLD: 3.06 M/UL — LOW (ref 4.7–6.1)
RBC # FLD: 15 % — HIGH (ref 11.5–14.5)
SODIUM SERPL-SCNC: 139 MMOL/L — SIGNIFICANT CHANGE UP (ref 135–146)
WBC # BLD: 8.34 K/UL — SIGNIFICANT CHANGE UP (ref 4.8–10.8)
WBC # FLD AUTO: 8.34 K/UL — SIGNIFICANT CHANGE UP (ref 4.8–10.8)

## 2018-12-08 RX ORDER — ATORVASTATIN CALCIUM 80 MG/1
80 TABLET, FILM COATED ORAL AT BEDTIME
Qty: 0 | Refills: 0 | Status: DISCONTINUED | OUTPATIENT
Start: 2018-12-08 | End: 2018-12-10

## 2018-12-08 RX ADMIN — Medication 100 MILLIGRAM(S): at 05:10

## 2018-12-08 RX ADMIN — Medication 200 MILLIGRAM(S): at 05:13

## 2018-12-08 RX ADMIN — Medication 1 MILLIGRAM(S): at 11:26

## 2018-12-08 RX ADMIN — Medication 1 MILLIGRAM(S): at 09:08

## 2018-12-08 RX ADMIN — SENNA PLUS 2 TABLET(S): 8.6 TABLET ORAL at 22:25

## 2018-12-08 RX ADMIN — Medication 200 MILLIGRAM(S): at 11:26

## 2018-12-08 RX ADMIN — LEVETIRACETAM 1500 MILLIGRAM(S): 250 TABLET, FILM COATED ORAL at 05:10

## 2018-12-08 RX ADMIN — Medication 200 MILLIGRAM(S): at 17:37

## 2018-12-08 RX ADMIN — APIXABAN 5 MILLIGRAM(S): 2.5 TABLET, FILM COATED ORAL at 17:37

## 2018-12-08 RX ADMIN — PANTOPRAZOLE SODIUM 40 MILLIGRAM(S): 20 TABLET, DELAYED RELEASE ORAL at 05:10

## 2018-12-08 RX ADMIN — Medication 200 MILLIGRAM(S): at 23:14

## 2018-12-08 RX ADMIN — ATORVASTATIN CALCIUM 80 MILLIGRAM(S): 80 TABLET, FILM COATED ORAL at 22:25

## 2018-12-08 RX ADMIN — APIXABAN 5 MILLIGRAM(S): 2.5 TABLET, FILM COATED ORAL at 05:13

## 2018-12-08 RX ADMIN — Medication 100 MILLIGRAM(S): at 17:37

## 2018-12-08 RX ADMIN — LEVETIRACETAM 1500 MILLIGRAM(S): 250 TABLET, FILM COATED ORAL at 17:37

## 2018-12-08 RX ADMIN — Medication 100 MILLIGRAM(S): at 11:26

## 2018-12-08 RX ADMIN — PANTOPRAZOLE SODIUM 40 MILLIGRAM(S): 20 TABLET, DELAYED RELEASE ORAL at 17:37

## 2018-12-08 RX ADMIN — CHLORHEXIDINE GLUCONATE 1 APPLICATION(S): 213 SOLUTION TOPICAL at 05:12

## 2018-12-08 RX ADMIN — Medication 200 MILLIGRAM(S): at 00:00

## 2018-12-08 NOTE — PROGRESS NOTE ADULT - ATTENDING COMMENTS
Patient seen and examined independently earlier today. Case discussed with housestaff, nursing, social work, patient. I agree with most of the resident's note, physical exam, and plan except as below. Patient feels better. No new complaints. Finish Vanco course and repeat BCx. F/u MRA head and neck. Cont Eliquis. Rehab.

## 2018-12-08 NOTE — PROGRESS NOTE ADULT - ASSESSMENT
# MRSA bacteremia:   - completed 14 days course of IV vancomycin. pending blood cultures , per ID will need a negative blood culture result before discharging the patient   - SAVANNAH negative for vegetations    # Bilateral pulmonary embolism / Extensive DVT in BL lower extremities:   - CT Chest w/ IV contrast shows multiple bilateral acute pulmonary emboli involving all 5 lobes   - TTE shows no evidence of RIGHT heart strain  - on Eliquis 5 mg BID    # CVA:   - Neurology recommending MRA head and neck to r/o atherosclerotic disease ( may start Aspirin afterwards)  - MRI brain showing acute/subacute frontal lobe lacunar infarcts and subdural hematoma with no mass effect   - CT head upon admission showed small infarct in the right frontal lobe and small lacunar infarcts within the basal ganglia and thalami  - Neurosurgery recommending continuing anticoagulation and no acute intervention    # One episode of Coffee ground emesis in ER; Resolved  - GI recommendations appreciated: Started on Protnix IV; switched to PO BID  - As per GI, AC benefit outweighs the risk of GI bleed at this time  - no intervention at this time    # Transaminitis:    - trending down  - Lipitor 10 mg PO daily started  - nonsignificant abdominal US; Hepatitis workup negative    # Seizure:  - Continue with Dilantin/Keppra    # Hypertension; Chronic  - Controlled as inpatient    # Thiamine deficiency; Chronic  - Continue Thiamine PO 100mg daily     # Stage II sacral ulcer; Chronic  - No evidence of infection  - Continue daily wound care  - Off-loading protocol      DVT ppx: Eliquis  GI ppx: Protonix  Diet: dysphagia 1 puree , thin liquids   Disposition: to snf once medically cleared # MRSA bacteremia:   - completed 14 days course of IV vancomycin,  blood cultures drawn , per ID will need a negative blood culture result before discharging the patient   - SAVANNAH negative for vegetations    # Bilateral pulmonary embolism / Extensive DVT in BL lower extremities:   - CT Chest w/ IV contrast shows multiple bilateral acute pulmonary emboli involving all 5 lobes   - TTE shows no evidence of RIGHT heart strain  - on Eliquis 5 mg BID    # CVA:   - Neurology recommending MRA head and neck to r/o atherosclerotic disease ( may start Aspirin afterwards)  - MRI brain showing acute/subacute frontal lobe lacunar infarcts and subdural hematoma with no mass effect   - CT head upon admission showed small infarct in the right frontal lobe and small lacunar infarcts within the basal ganglia and thalami  - Neurosurgery recommending continuing anticoagulation and no acute intervention    # One episode of Coffee ground emesis in ER; Resolved  - GI recommendations appreciated: Started on Protnix IV; switched to PO BID  - As per GI, AC benefit outweighs the risk of GI bleed at this time  - no intervention at this time    # Transaminitis:    - trending down  - Lipitor 10 mg PO daily started  - nonsignificant abdominal US; Hepatitis workup negative    # Seizure:  - Continue with Dilantin/Keppra    # Hypertension; Chronic  - Controlled as inpatient    # Thiamine deficiency; Chronic  - Continue Thiamine PO 100mg daily     # Stage II sacral ulcer; Chronic  - No evidence of infection  - Continue daily wound care  - Off-loading protocol      DVT ppx: Eliquis  GI ppx: Protonix  Diet: dysphagia 1 puree , thin liquids   Disposition: to snf once medically cleared

## 2018-12-08 NOTE — PROGRESS NOTE ADULT - SUBJECTIVE AND OBJECTIVE BOX
SUBJECTIVE:    Patient is a 58y old Male who presents with a chief complaint of sob and fall (07 Dec 2018 13:23)  Currently admitted to medicine with the primary diagnosis of Acute respiratory failure with hypoxia  Today is hospital day 15d. This morning he is resting comfortably in bed.    PAST MEDICAL & SURGICAL HISTORY  HLD (hyperlipidemia)  HTN (hypertension)  Seizures  Unilateral inguinal hernia with obstruction and without gangrene, recurrence not specified  No significant past surgical history    SOCIAL HISTORY:  Negative for smoking/alcohol/drug use.     ALLERGIES:  No Known Allergies    MEDICATIONS:  STANDING MEDICATIONS  apixaban 5 milliGRAM(s) Oral every 12 hours  atorvastatin 10 milliGRAM(s) Oral at bedtime  chlorhexidine 4% Liquid 1 Application(s) Topical <User Schedule>  docusate sodium 100 milliGRAM(s) Oral two times a day  folic acid 1 milliGRAM(s) Oral daily  levETIRAcetam 1500 milliGRAM(s) Oral two times a day  LORazepam   Injectable 1 milliGRAM(s) IV Push once  pantoprazole    Tablet 40 milliGRAM(s) Oral two times a day  phenytoin   Capsule 200 milliGRAM(s) Oral four times a day  senna 2 Tablet(s) Oral at bedtime  thiamine 100 milliGRAM(s) Oral daily    PRN MEDICATIONS    VITALS:   T(F): 97  HR: 93  BP: 144/79  RR: 20  SpO2: --    LABS:                        9.1    8.34  )-----------( 279      ( 08 Dec 2018 07:33 )             28.0       PHYSICAL EXAM:  GEN: No acute distress  LUNGS: Clear to auscultation bilaterally   HEART: S1/S2 present.   ABD: Soft, non-tender, non-distended. Bowel sounds present  EXT: NC/NC/NE/2+PP  NEURO: AAOX3 SUBJECTIVE:    Patient is a 58y old Male who presents with a chief complaint of sob and fall (07 Dec 2018 13:23)  Currently admitted to medicine with the primary diagnosis of Acute respiratory failure with hypoxia  Today is hospital day 15d. This morning he is resting comfortably in bed.  this AM pt is in distressed mood and not cooperative with the encounter.    PAST MEDICAL & SURGICAL HISTORY  HLD (hyperlipidemia)  HTN (hypertension)  Seizures  Unilateral inguinal hernia with obstruction and without gangrene, recurrence not specified  No significant past surgical history    SOCIAL HISTORY:  Negative for smoking/alcohol/drug use.     ALLERGIES:  No Known Allergies    MEDICATIONS:  STANDING MEDICATIONS  apixaban 5 milliGRAM(s) Oral every 12 hours  atorvastatin 10 milliGRAM(s) Oral at bedtime  chlorhexidine 4% Liquid 1 Application(s) Topical <User Schedule>  docusate sodium 100 milliGRAM(s) Oral two times a day  folic acid 1 milliGRAM(s) Oral daily  levETIRAcetam 1500 milliGRAM(s) Oral two times a day  LORazepam   Injectable 1 milliGRAM(s) IV Push once  pantoprazole    Tablet 40 milliGRAM(s) Oral two times a day  phenytoin   Capsule 200 milliGRAM(s) Oral four times a day  senna 2 Tablet(s) Oral at bedtime  thiamine 100 milliGRAM(s) Oral daily    PRN MEDICATIONS    VITALS:   T(F): 97  HR: 93  BP: 144/79  RR: 20  SpO2: --    LABS:                        9.1    8.34  )-----------( 279      ( 08 Dec 2018 07:33 )             28.0       PHYSICAL EXAM:  GEN: No acute distress  LUNGS: Clear to auscultation bilaterally   HEART: S1/S2 present.   ABD: Soft, non-tender, non-distended. Bowel sounds present  EXT: NC/NC/NE/2+PP  NEURO: AAOX3

## 2018-12-09 LAB
ANION GAP SERPL CALC-SCNC: 18 MMOL/L — HIGH (ref 7–14)
BUN SERPL-MCNC: 6 MG/DL — LOW (ref 10–20)
CALCIUM SERPL-MCNC: 8.8 MG/DL — SIGNIFICANT CHANGE UP (ref 8.5–10.1)
CHLORIDE SERPL-SCNC: 102 MMOL/L — SIGNIFICANT CHANGE UP (ref 98–110)
CO2 SERPL-SCNC: 21 MMOL/L — SIGNIFICANT CHANGE UP (ref 17–32)
CREAT SERPL-MCNC: 0.6 MG/DL — LOW (ref 0.7–1.5)
GLUCOSE SERPL-MCNC: 86 MG/DL — SIGNIFICANT CHANGE UP (ref 70–99)
HCT VFR BLD CALC: 31 % — LOW (ref 42–52)
HGB BLD-MCNC: 9.7 G/DL — LOW (ref 14–18)
MCHC RBC-ENTMCNC: 30 PG — SIGNIFICANT CHANGE UP (ref 27–31)
MCHC RBC-ENTMCNC: 31.3 G/DL — LOW (ref 32–37)
MCV RBC AUTO: 96 FL — HIGH (ref 80–94)
NRBC # BLD: 0 /100 WBCS — SIGNIFICANT CHANGE UP (ref 0–0)
PLATELET # BLD AUTO: 184 K/UL — SIGNIFICANT CHANGE UP (ref 130–400)
POTASSIUM SERPL-MCNC: 4.1 MMOL/L — SIGNIFICANT CHANGE UP (ref 3.5–5)
POTASSIUM SERPL-SCNC: 4.1 MMOL/L — SIGNIFICANT CHANGE UP (ref 3.5–5)
RBC # BLD: 3.23 M/UL — LOW (ref 4.7–6.1)
RBC # FLD: 15.1 % — HIGH (ref 11.5–14.5)
SODIUM SERPL-SCNC: 141 MMOL/L — SIGNIFICANT CHANGE UP (ref 135–146)
WBC # BLD: 7.54 K/UL — SIGNIFICANT CHANGE UP (ref 4.8–10.8)
WBC # FLD AUTO: 7.54 K/UL — SIGNIFICANT CHANGE UP (ref 4.8–10.8)

## 2018-12-09 RX ADMIN — LEVETIRACETAM 1500 MILLIGRAM(S): 250 TABLET, FILM COATED ORAL at 18:03

## 2018-12-09 RX ADMIN — Medication 100 MILLIGRAM(S): at 12:42

## 2018-12-09 RX ADMIN — LEVETIRACETAM 1500 MILLIGRAM(S): 250 TABLET, FILM COATED ORAL at 05:59

## 2018-12-09 RX ADMIN — Medication 200 MILLIGRAM(S): at 18:04

## 2018-12-09 RX ADMIN — ATORVASTATIN CALCIUM 80 MILLIGRAM(S): 80 TABLET, FILM COATED ORAL at 22:13

## 2018-12-09 RX ADMIN — Medication 200 MILLIGRAM(S): at 12:42

## 2018-12-09 RX ADMIN — PANTOPRAZOLE SODIUM 40 MILLIGRAM(S): 20 TABLET, DELAYED RELEASE ORAL at 05:58

## 2018-12-09 RX ADMIN — Medication 200 MILLIGRAM(S): at 23:27

## 2018-12-09 RX ADMIN — Medication 100 MILLIGRAM(S): at 18:03

## 2018-12-09 RX ADMIN — Medication 200 MILLIGRAM(S): at 05:58

## 2018-12-09 RX ADMIN — SENNA PLUS 2 TABLET(S): 8.6 TABLET ORAL at 22:13

## 2018-12-09 RX ADMIN — APIXABAN 5 MILLIGRAM(S): 2.5 TABLET, FILM COATED ORAL at 18:03

## 2018-12-09 RX ADMIN — APIXABAN 5 MILLIGRAM(S): 2.5 TABLET, FILM COATED ORAL at 05:59

## 2018-12-09 RX ADMIN — Medication 100 MILLIGRAM(S): at 05:59

## 2018-12-09 RX ADMIN — PANTOPRAZOLE SODIUM 40 MILLIGRAM(S): 20 TABLET, DELAYED RELEASE ORAL at 18:03

## 2018-12-09 RX ADMIN — Medication 1 MILLIGRAM(S): at 12:42

## 2018-12-09 NOTE — PROGRESS NOTE ADULT - ASSESSMENT
# MRSA bacteremia:   - completed 14 days course of IV vancomycin,  blood cultures drawn , per ID will need a negative blood culture result before discharging the patient   - SAVANNAH negative for vegetations    # Bilateral pulmonary embolism / Extensive DVT in BL lower extremities:   - CT Chest w/ IV contrast shows multiple bilateral acute pulmonary emboli involving all 5 lobes   - TTE shows no evidence of RIGHT heart strain  - on Eliquis 5 mg BID    # CVA:   - MRI brain showing acute/subacute frontal lobe lacunar infarcts and subdural hematoma with no mass effect   - CT head upon admission showed small infarct in the right frontal lobe and small lacunar infarcts within the basal ganglia and thalami  - Neurosurgery recommending continuing anticoagulation and no acute intervention  -would d/w neuro need for ASA as pt is already on A/c and addition of ASA will increase his risk of bleeding  -Lipitor 80    # One episode of Coffee ground emesis in ER; Resolved  - GI recommendations appreciated: Started on Protnix IV; switched to PO BID  - As per GI, AC benefit outweighs the risk of GI bleed at this time  - no intervention at this time    # Transaminitis:    - trending down  - nonsignificant abdominal US; Hepatitis workup negative    # Seizure:  - Continue with Dilantin/Keppra    # Hypertension; Chronic  - Controlled as inpatient    # Thiamine deficiency; Chronic  - Continue Thiamine PO 100mg daily     # Stage II sacral ulcer; Chronic  - No evidence of infection  - Continue daily wound care  - Off-loading protocol      DVT ppx: Eliquis  GI ppx: Protonix  Diet: dysphagia 1 puree , thin liquids     Dispo : would start working on d/c planning

## 2018-12-09 NOTE — PROGRESS NOTE ADULT - SUBJECTIVE AND OBJECTIVE BOX
SUBJECTIVE: denies CP, SOB    Patient is a 58y old Male who presents with a chief complaint of sob and fall (07 Dec 2018 13:23)  Currently admitted to medicine with the primary diagnosis of Acute respiratory failure with hypoxia  This morning he is resting comfortably in bed.  this AM pt is cranky and not very cooperative with the encounter.    PAST MEDICAL & SURGICAL HISTORY  HLD (hyperlipidemia)  HTN (hypertension)  Seizures  Unilateral inguinal hernia with obstruction and without gangrene, recurrence not specified  No significant past surgical history    SOCIAL HISTORY:  Negative for smoking/alcohol/drug use.     ALLERGIES:  No Known Allergies    PHYSICAL EXAM:  GEN: No acute distress  LUNGS: Clear to auscultation bilaterally   HEART: S1/S2 present.   ABD: Soft, non-tender, non-distended. Bowel sounds present  EXT: NC/NC/NE/2+PP  NEURO: AAOX2        ROS: Denies CP, SOB, AP    MEDICATIONS  (STANDING):  apixaban 5 milliGRAM(s) Oral every 12 hours  atorvastatin 80 milliGRAM(s) Oral at bedtime  chlorhexidine 4% Liquid 1 Application(s) Topical <User Schedule>  docusate sodium 100 milliGRAM(s) Oral two times a day  folic acid 1 milliGRAM(s) Oral daily  levETIRAcetam 1500 milliGRAM(s) Oral two times a day  pantoprazole    Tablet 40 milliGRAM(s) Oral two times a day  phenytoin   Capsule 200 milliGRAM(s) Oral four times a day  senna 2 Tablet(s) Oral at bedtime  thiamine 100 milliGRAM(s) Oral daily    MEDICATIONS  (PRN):    Home Medications:  Flomax 0.4 mg oral capsule: 1 cap(s) orally once a day (23 Nov 2018 04:31)  folic acid 1 mg oral tablet: 1 tab(s) orally once a day (23 Nov 2018 04:31)  Keppra 1000 mg oral tablet: 1 tab(s) orally every 8 hours (23 Nov 2018 04:31)  Lipitor 40 mg oral tablet: 1 tab(s) orally once a day (23 Nov 2018 04:31)  Norvasc 10 mg oral tablet: 1 tab(s) orally once a day (23 Nov 2018 04:31)  phenytoin 200 mg oral capsule, extended release: 1 cap(s) orally every 8 hours (23 Nov 2018 04:31)  phenytoin 200 mg oral capsule, extended release: 1 cap(s) orally once a day (at bedtime) (23 Nov 2018 04:31)  thiamine 100 mg oral tablet: 1 tab(s) orally once a day (23 Nov 2018 04:31)  Tylenol 325 mg oral tablet: 2 tab(s) orally every 4 hours, As Needed (23 Nov 2018 04:31)    Vital Signs Last 24 Hrs  T(C): 36.2 (09 Dec 2018 04:45), Max: 36.7 (08 Dec 2018 20:29)  T(F): 97.1 (09 Dec 2018 04:45), Max: 98 (08 Dec 2018 20:29)  HR: 92 (09 Dec 2018 04:45) (92 - 99)  BP: 127/77 (09 Dec 2018 04:45) (114/69 - 135/81)  BP(mean): --  RR: 18 (09 Dec 2018 04:45) (18 - 18)  SpO2: 98% (09 Dec 2018 06:20) (98% - 98%)  CAPILLARY BLOOD GLUCOSE        LABS:                        9.7    7.54  )-----------( 184      ( 09 Dec 2018 05:45 )             31.0     12-09    141  |  102  |  6<L>  ----------------------------<  86  4.1   |  21  |  0.6<L>    Ca    8.8      09 Dec 2018 05:45                        Consultant(s) Notes Reviewed:  [x ] YES  [ ] NO  Care Discussed with Consultants/Other Providers/ Housestaff [ x] YES  [ ] NO  Radiology personally reviewed.

## 2018-12-10 LAB
ANION GAP SERPL CALC-SCNC: 14 MMOL/L — SIGNIFICANT CHANGE UP (ref 7–14)
BUN SERPL-MCNC: 7 MG/DL — LOW (ref 10–20)
CALCIUM SERPL-MCNC: 8.9 MG/DL — SIGNIFICANT CHANGE UP (ref 8.5–10.1)
CHLORIDE SERPL-SCNC: 102 MMOL/L — SIGNIFICANT CHANGE UP (ref 98–110)
CK MB CFR SERPL CALC: <1 NG/ML — SIGNIFICANT CHANGE UP (ref 0.6–6.3)
CK SERPL-CCNC: 16 U/L — SIGNIFICANT CHANGE UP (ref 0–225)
CO2 SERPL-SCNC: 25 MMOL/L — SIGNIFICANT CHANGE UP (ref 17–32)
CREAT SERPL-MCNC: 0.6 MG/DL — LOW (ref 0.7–1.5)
GLUCOSE SERPL-MCNC: 90 MG/DL — SIGNIFICANT CHANGE UP (ref 70–99)
HCT VFR BLD CALC: 30.7 % — LOW (ref 42–52)
HGB BLD-MCNC: 9.8 G/DL — LOW (ref 14–18)
MCHC RBC-ENTMCNC: 29 PG — SIGNIFICANT CHANGE UP (ref 27–31)
MCHC RBC-ENTMCNC: 31.9 G/DL — LOW (ref 32–37)
MCV RBC AUTO: 90.8 FL — SIGNIFICANT CHANGE UP (ref 80–94)
NRBC # BLD: 0 /100 WBCS — SIGNIFICANT CHANGE UP (ref 0–0)
PLATELET # BLD AUTO: 570 K/UL — HIGH (ref 130–400)
POTASSIUM SERPL-MCNC: 4 MMOL/L — SIGNIFICANT CHANGE UP (ref 3.5–5)
POTASSIUM SERPL-SCNC: 4 MMOL/L — SIGNIFICANT CHANGE UP (ref 3.5–5)
RBC # BLD: 3.38 M/UL — LOW (ref 4.7–6.1)
RBC # FLD: 15 % — HIGH (ref 11.5–14.5)
SODIUM SERPL-SCNC: 141 MMOL/L — SIGNIFICANT CHANGE UP (ref 135–146)
TROPONIN T SERPL-MCNC: <0.01 NG/ML — SIGNIFICANT CHANGE UP
WBC # BLD: 8.46 K/UL — SIGNIFICANT CHANGE UP (ref 4.8–10.8)
WBC # FLD AUTO: 8.46 K/UL — SIGNIFICANT CHANGE UP (ref 4.8–10.8)

## 2018-12-10 RX ORDER — APIXABAN 2.5 MG/1
1 TABLET, FILM COATED ORAL
Qty: 0 | Refills: 0 | COMMUNITY
Start: 2018-12-10

## 2018-12-10 RX ORDER — LEVETIRACETAM 250 MG/1
2 TABLET, FILM COATED ORAL
Qty: 0 | Refills: 0 | COMMUNITY
Start: 2018-12-10

## 2018-12-10 RX ORDER — PANTOPRAZOLE SODIUM 20 MG/1
1 TABLET, DELAYED RELEASE ORAL
Qty: 30 | Refills: 0 | OUTPATIENT
Start: 2018-12-10 | End: 2019-01-08

## 2018-12-10 RX ORDER — AMLODIPINE BESYLATE 2.5 MG/1
1 TABLET ORAL
Qty: 0 | Refills: 0 | COMMUNITY

## 2018-12-10 RX ORDER — TAMSULOSIN HYDROCHLORIDE 0.4 MG/1
1 CAPSULE ORAL
Qty: 0 | Refills: 0 | COMMUNITY

## 2018-12-10 RX ORDER — LEVETIRACETAM 250 MG/1
1 TABLET, FILM COATED ORAL
Qty: 0 | Refills: 0 | COMMUNITY

## 2018-12-10 RX ORDER — ACETAMINOPHEN 500 MG
2 TABLET ORAL
Qty: 0 | Refills: 0 | COMMUNITY

## 2018-12-10 RX ORDER — ATORVASTATIN CALCIUM 80 MG/1
40 TABLET, FILM COATED ORAL AT BEDTIME
Qty: 0 | Refills: 0 | Status: DISCONTINUED | OUTPATIENT
Start: 2018-12-10 | End: 2018-12-13

## 2018-12-10 RX ADMIN — Medication 200 MILLIGRAM(S): at 23:29

## 2018-12-10 RX ADMIN — Medication 100 MILLIGRAM(S): at 12:18

## 2018-12-10 RX ADMIN — PANTOPRAZOLE SODIUM 40 MILLIGRAM(S): 20 TABLET, DELAYED RELEASE ORAL at 06:50

## 2018-12-10 RX ADMIN — SENNA PLUS 2 TABLET(S): 8.6 TABLET ORAL at 21:48

## 2018-12-10 RX ADMIN — PANTOPRAZOLE SODIUM 40 MILLIGRAM(S): 20 TABLET, DELAYED RELEASE ORAL at 17:35

## 2018-12-10 RX ADMIN — APIXABAN 5 MILLIGRAM(S): 2.5 TABLET, FILM COATED ORAL at 17:35

## 2018-12-10 RX ADMIN — APIXABAN 5 MILLIGRAM(S): 2.5 TABLET, FILM COATED ORAL at 05:56

## 2018-12-10 RX ADMIN — Medication 200 MILLIGRAM(S): at 05:56

## 2018-12-10 RX ADMIN — Medication 200 MILLIGRAM(S): at 17:35

## 2018-12-10 RX ADMIN — Medication 200 MILLIGRAM(S): at 12:18

## 2018-12-10 RX ADMIN — LEVETIRACETAM 1500 MILLIGRAM(S): 250 TABLET, FILM COATED ORAL at 17:35

## 2018-12-10 RX ADMIN — Medication 100 MILLIGRAM(S): at 05:56

## 2018-12-10 RX ADMIN — CHLORHEXIDINE GLUCONATE 1 APPLICATION(S): 213 SOLUTION TOPICAL at 05:56

## 2018-12-10 RX ADMIN — ATORVASTATIN CALCIUM 40 MILLIGRAM(S): 80 TABLET, FILM COATED ORAL at 21:47

## 2018-12-10 RX ADMIN — LEVETIRACETAM 1500 MILLIGRAM(S): 250 TABLET, FILM COATED ORAL at 05:56

## 2018-12-10 RX ADMIN — Medication 1 MILLIGRAM(S): at 12:18

## 2018-12-10 NOTE — CHART NOTE - NSCHARTNOTEFT_GEN_A_CORE
Registered Dietitian Limited Follow Up     SAVANNAH negative for vegetations. Discharge to SNF when bed available. Meds and labs reviewed, LBM 12/8. 3+ edema noted to B/L leg, no new wts documented. Pt continues with stage III pressure injury to sacrum. Per RN, pt eats 100% of his meal trays, but needs assistance. Pt noted to be 1:1 feeds. Will communicate with LIP to activate pending supplements orders. Pt is currently meeting kcal/pro needs and there are no further nutritional interventions at this time. RD will reassess pt again in 7 days.

## 2018-12-10 NOTE — PROGRESS NOTE ADULT - SUBJECTIVE AND OBJECTIVE BOX
SUBJECTIVE:    Patient is a 58y old Male who presents with a chief complaint of sob and fall (10 Dec 2018 11:25)  Currently admitted to medicine with the primary diagnosis of Acute respiratory failure with hypoxia  Today is hospital day 17d. This morning he is resting comfortably in bed.  pt has no complain this AM , asking about going home.    PAST MEDICAL & SURGICAL HISTORY  HLD (hyperlipidemia)  HTN (hypertension)  Seizures  Unilateral inguinal hernia with obstruction and without gangrene, recurrence not specified  No significant past surgical history    SOCIAL HISTORY:  Negative for smoking/alcohol/drug use.     ALLERGIES:  No Known Allergies    MEDICATIONS:  STANDING MEDICATIONS  apixaban 5 milliGRAM(s) Oral every 12 hours  atorvastatin 40 milliGRAM(s) Oral at bedtime  chlorhexidine 4% Liquid 1 Application(s) Topical <User Schedule>  docusate sodium 100 milliGRAM(s) Oral two times a day  folic acid 1 milliGRAM(s) Oral daily  levETIRAcetam 1500 milliGRAM(s) Oral two times a day  pantoprazole    Tablet 40 milliGRAM(s) Oral two times a day  phenytoin   Capsule 200 milliGRAM(s) Oral four times a day  senna 2 Tablet(s) Oral at bedtime  thiamine 100 milliGRAM(s) Oral daily    PRN MEDICATIONS    VITALS:   T(F): 98.1  HR: 90  BP: 128/77  RR: 18  SpO2: 98%    LABS:                        9.8    8.46  )-----------( 570      ( 10 Dec 2018 07:39 )             30.7     12-10    141  |  102  |  7<L>  ----------------------------<  90  4.0   |  25  |  0.6<L>    Ca    8.9      10 Dec 2018 07:39            Creatine Kinase, Serum: 16 U/L (12-10-18 @ 07:39)  Troponin T, Serum: <0.01 ng/mL (12-10-18 @ 07:39)      Culture - Blood (collected 09 Dec 2018 04:30)  Source: .Blood Blood-Peripheral  Preliminary Report (10 Dec 2018 13:01):    No growth to date.    Culture - Blood (collected 08 Dec 2018 07:33)  Source: .Blood None  Preliminary Report (09 Dec 2018 16:00):    No growth to date.    Culture - Blood (collected 08 Dec 2018 07:33)  Source: .Blood None  Preliminary Report (09 Dec 2018 16:00):    No growth to date.      CARDIAC MARKERS ( 10 Dec 2018 07:39 )  x     / <0.01 ng/mL / 16 U/L / x     / <1.0 ng/mL      RADIOLOGY:    < from: MR Angio Head No Cont (12.08.18 @ 10:43) >  IMPRESSION:     1.  MRA neck: No evidence of major vascular stenosis.    2.  MRA brain: Significant motion artifact. No gross evidence of major   vascular stenosis. Dolichoectasia of the intracranial vessels likely due   to hypertension.    < end of copied text >      PHYSICAL EXAM:  GEN: No acute distress  LUNGS: Clear to auscultation bilaterally   HEART: S1/S2 present.   ABD: Soft, non-tender, non-distended. Bowel sounds present  EXT: NC/NC/NE/2+PP  NEURO: AAOX2

## 2018-12-10 NOTE — PROGRESS NOTE ADULT - ASSESSMENT
# MRSA bacteremia:   - completed 14 days course of IV vancomycin,  blood cultures from 12/8 is negative  - SAVANNAH negative for vegetations    # Bilateral pulmonary embolism / Extensive DVT in BL lower extremities:   - CT Chest w/ IV contrast shows multiple bilateral acute pulmonary emboli involving all 5 lobes   - TTE shows no evidence of RIGHT heart strain  - on Eliquis 5 mg BID    # CVA:   - MRI brain showing acute/subacute frontal lobe lacunar infarcts and subdural hematoma with no mass effect   - CT head upon admission showed small infarct in the right frontal lobe and small lacunar infarcts within the basal ganglia and thalami  - Neurosurgery recommending continuing anticoagulation and no acute intervention  - MRA : no stenosis , no need for ASA  - Lipitor 40 mg    # One episode of Coffee ground emesis in ER; Resolved  - GI recommendations appreciated: Started on Protonix IV; switched to PO BID  - As per GI, AC benefit outweighs the risk of GI bleed at this time  - no intervention at this time    # Transaminitis:    - trending down  - nonsignificant abdominal US; Hepatitis workup negative    # Seizure:  - Continue with Dilantin/Keppra    # Hypertension; Chronic  - Controlled as inpatient    # Thiamine deficiency; Chronic  - Continue Thiamine PO 100mg daily     # Stage II sacral ulcer; Chronic  - No evidence of infection  - Continue daily wound care  - Off-loading protocol      DVT ppx: Eliquis  GI ppx: Protonix  Diet: dysphagia 1 puree , thin liquids   Dispo : pending Auth for SNF placement

## 2018-12-10 NOTE — PROGRESS NOTE ADULT - ASSESSMENT
# MRSA bacteremia:   - completed 14 days course of IV vancomycin,  -blood cultures after finishing abx are neg for 48 hours   - SAVANNAH negative for vegetations    # Bilateral pulmonary embolism / Extensive DVT in BL lower extremities:   on eliquis 5 BID   OPT follow up with chapin     # CVA:   - MRI brain showing acute/subacute frontal lobe lacunar infarcts and subdural hematoma with no mass effect   - CT head upon admission showed small infarct in the right frontal lobe and small lacunar infarcts within the basal ganglia and thalami  - Neurosurgery recommending continuing anticoagulation and no acute intervention  -discussed with neuro DR Enoc Younger and since there is no evidence of stenosis on MRA then no need for ASA and to continue with eliquis   -Lipitor 40 would not do 80 patient has transaminitis     # One episode of ??Coffee ground emesis in ER; Resolved  CBC stable spoke to GI and no further intervention and to follow up as OPT   on PPI     # Transaminitis:    - trending down  - nonsignificant abdominal US; Hepatitis workup negative    # Seizure:  - Continue with Dilantin/Keppra    # Hypertension; Chronic  - Controlled as inpatient    # Thiamine deficiency; Chronic  - Continue Thiamine PO 100mg daily     # Stage II sacral ulcer; Chronic  - No evidence of infection  - Continue daily wound care  - Off-loading protocol      DVT ppx: Eliquis  GI ppx: Protonix  Diet: dysphagia 1 puree , thin liquids     Dispo : discharge to SNF when bed available

## 2018-12-10 NOTE — PROGRESS NOTE ADULT - SUBJECTIVE AND OBJECTIVE BOX
no chest pain and no sob     Vital Signs Last 24 Hrs  T(C): 36.9 (10 Dec 2018 06:58), Max: 36.9 (10 Dec 2018 06:58)  T(F): 98.4 (10 Dec 2018 06:58), Max: 98.4 (10 Dec 2018 06:58)  HR: 91 (10 Dec 2018 06:58) (91 - 102)  BP: 129/91 (10 Dec 2018 06:58) (121/77 - 130/78)  BP(mean): --  RR: 18 (10 Dec 2018 06:58) (18 - 18)  SpO2: 98% (10 Dec 2018 06:58) (98% - 98%)    PHYSICAL EXAM:  GENERAL: NAD, well-developed  HEAD:  Atraumatic, Normocephalic  EYES: EOMI, PERRLA, conjunctiva and sclera clear  NECK: Supple, No JVD  Pulm: Clear to auscultation bilaterally; No wheeze  cV: Regular rate and rhythm; No murmurs, rubs, or gallops  GI: Soft, Nontender, Nondistended; Bowel sounds present  EXTREMITIES:  2+ Peripheral Pulses, No clubbing, cyanosis, or edema  PSYCH: AAOx3  NEUROLOGY: non-focal  SKIN: No rashes or lesions                          9.8    8.46  )-----------( 570      ( 10 Dec 2018 07:39 )             30.7     12-10    141  |  102  |  7<L>  ----------------------------<  90  4.0   |  25  |  0.6<L>    Ca    8.9      10 Dec 2018 07:39          CARDIAC MARKERS ( 10 Dec 2018 07:39 )  x     / <0.01 ng/mL / 16 U/L / x     / <1.0 ng/mL        Culture - Blood (collected 08 Dec 2018 07:33)  Source: .Blood None  Preliminary Report (09 Dec 2018 16:00):    No growth to date.    Culture - Blood (collected 08 Dec 2018 07:33)  Source: .Blood None  Preliminary Report (09 Dec 2018 16:00):    No growth to date.

## 2018-12-11 ENCOUNTER — TRANSCRIPTION ENCOUNTER (OUTPATIENT)
Age: 58
End: 2018-12-11

## 2018-12-11 RX ADMIN — PANTOPRAZOLE SODIUM 40 MILLIGRAM(S): 20 TABLET, DELAYED RELEASE ORAL at 05:31

## 2018-12-11 RX ADMIN — PANTOPRAZOLE SODIUM 40 MILLIGRAM(S): 20 TABLET, DELAYED RELEASE ORAL at 18:13

## 2018-12-11 RX ADMIN — Medication 100 MILLIGRAM(S): at 18:13

## 2018-12-11 RX ADMIN — Medication 100 MILLIGRAM(S): at 05:30

## 2018-12-11 RX ADMIN — Medication 200 MILLIGRAM(S): at 05:30

## 2018-12-11 RX ADMIN — CHLORHEXIDINE GLUCONATE 1 APPLICATION(S): 213 SOLUTION TOPICAL at 05:31

## 2018-12-11 RX ADMIN — LEVETIRACETAM 1500 MILLIGRAM(S): 250 TABLET, FILM COATED ORAL at 05:30

## 2018-12-11 RX ADMIN — LEVETIRACETAM 1500 MILLIGRAM(S): 250 TABLET, FILM COATED ORAL at 18:13

## 2018-12-11 RX ADMIN — APIXABAN 5 MILLIGRAM(S): 2.5 TABLET, FILM COATED ORAL at 05:30

## 2018-12-11 RX ADMIN — ATORVASTATIN CALCIUM 40 MILLIGRAM(S): 80 TABLET, FILM COATED ORAL at 22:09

## 2018-12-11 RX ADMIN — SENNA PLUS 2 TABLET(S): 8.6 TABLET ORAL at 22:09

## 2018-12-11 RX ADMIN — APIXABAN 5 MILLIGRAM(S): 2.5 TABLET, FILM COATED ORAL at 18:13

## 2018-12-11 RX ADMIN — Medication 200 MILLIGRAM(S): at 18:14

## 2018-12-11 NOTE — DISCHARGE NOTE ADULT - CARE PLAN
Principal Discharge DX:	Pulmonary thromboembolism  Goal:	medically optimize condition  Assessment and plan of treatment:	1- take medications as prescribed  2- follow up with Dr. Navarro at [facility] clinic in 2 weeks  3- follow up with GI as out patient at [facility] clinic in 4 weeks ( Dr. Cabrera) Principal Discharge DX:	Pulmonary thromboembolism  Goal:	medically optimize condition  Assessment and plan of treatment:	1- take medications as prescribed  2- follow up with Dr. Navarro at [facility] clinic in 2 weeks  3- follow up with GI as out patient at [facility] clinic in 4 weeks ( Dr. Cabrera)  4 - follow up with pulmonologist (Dr. Almodovar) within 2 weeks of discharge.

## 2018-12-11 NOTE — DISCHARGE NOTE ADULT - CARE PROVIDERS DIRECT ADDRESSES
[email or web address]

## 2018-12-11 NOTE — DISCHARGE NOTE ADULT - PATIENT PORTAL LINK FT
You can access the [facility] Patient Portal, offered by [facility], by registering with the following website: [email or web address]

## 2018-12-11 NOTE — DISCHARGE NOTE ADULT - PLAN OF CARE
medically optimize condition 1- take medications as prescribed  2- follow up with Dr. Navarro at [facility] clinic in 2 weeks  3- follow up with GI as out patient at [facility] clinic in 4 weeks ( Dr. Cabrera) 1- take medications as prescribed  2- follow up with Dr. Navarro at [facility] clinic in 2 weeks  3- follow up with GI as out patient at [facility] clinic in 4 weeks ( Dr. Cabrera)  4 - follow up with pulmonologist (Dr. Almodovar) within 2 weeks of discharge.

## 2018-12-11 NOTE — DISCHARGE NOTE ADULT - PROVIDER TOKENS
TOKEN:'[id number]',TOKEN:'[id number]' TOKEN:'[id number]:'[id number]',TOKEN:'[id number]'

## 2018-12-11 NOTE — DISCHARGE NOTE ADULT - ADDITIONAL INSTRUCTIONS
1- take medications as prescribed  2- follow up with Dr. Navarro at [facility] in 2 weeks  3- follow up with GI as out patient at [facility] clinic in 4 weeks ( Dr. Cabrera)  4 - follow up with pulmonologist (Dr. Almodovar) within 2 weeks of discharge.

## 2018-12-11 NOTE — DISCHARGE NOTE ADULT - MEDICATION SUMMARY - MEDICATIONS TO TAKE
I will START or STAY ON the medications listed below when I get home from the hospital:    phenytoin 200 mg oral capsule, extended release  -- 1 cap(s) by mouth 4 times a day  -- Indication: For Arrythmia    apixaban 5 mg oral tablet  -- 1 tab(s) by mouth every 12 hours  -- Indication: For Pulmonary thromboembolism    levETIRAcetam 750 mg oral tablet  -- 2 tab(s) by mouth 2 times a day  -- Indication: For Seizures    Lipitor 40 mg oral tablet  -- 1 tab(s) by mouth once a day  -- Indication: For HLD (hyperlipidemia)    folic acid 1 mg oral tablet  -- 1 tab(s) by mouth once a day  -- Indication: For Supplement    thiamine 100 mg oral tablet  -- 1 tab(s) by mouth once a day  -- Indication: For Supplement

## 2018-12-11 NOTE — PROGRESS NOTE ADULT - SUBJECTIVE AND OBJECTIVE BOX
SUBJECTIVE:    Patient is a 58y old Male who presents with a chief complaint of sob and fall (11 Dec 2018 09:23)  Currently admitted to medicine with the primary diagnosis of Acute respiratory failure with hypoxia  Today is hospital day 18d. This morning he is resting comfortably in bed.  this AM pt refused to take PO Seizure meds despite explaining how much important is it. pt has no complains    PAST MEDICAL & SURGICAL HISTORY  HLD (hyperlipidemia)  HTN (hypertension)  Seizures  Unilateral inguinal hernia with obstruction and without gangrene, recurrence not specified  No significant past surgical history    SOCIAL HISTORY:  Negative for smoking/alcohol/drug use.     ALLERGIES:  No Known Allergies    MEDICATIONS:  STANDING MEDICATIONS  apixaban 5 milliGRAM(s) Oral every 12 hours  atorvastatin 40 milliGRAM(s) Oral at bedtime  chlorhexidine 4% Liquid 1 Application(s) Topical <User Schedule>  docusate sodium 100 milliGRAM(s) Oral two times a day  folic acid 1 milliGRAM(s) Oral daily  levETIRAcetam 1500 milliGRAM(s) Oral two times a day  pantoprazole    Tablet 40 milliGRAM(s) Oral two times a day  phenytoin   Capsule 200 milliGRAM(s) Oral four times a day  senna 2 Tablet(s) Oral at bedtime  thiamine 100 milliGRAM(s) Oral daily    PRN MEDICATIONS    VITALS:   T(F): 98.4  HR: 89  BP: 125/77  RR: 18  SpO2: 100%    LABS:                        9.8    8.46  )-----------( 570      ( 10 Dec 2018 07:39 )             30.7     12-10    141  |  102  |  7<L>  ----------------------------<  90  4.0   |  25  |  0.6<L>    Ca    8.9      10 Dec 2018 07:39                Culture - Blood (collected 09 Dec 2018 04:30)  Source: .Blood Blood-Peripheral  Preliminary Report (10 Dec 2018 13:01):    No growth to date.      CARDIAC MARKERS ( 10 Dec 2018 07:39 )  x     / <0.01 ng/mL / 16 U/L / x     / <1.0 ng/mL        PHYSICAL EXAM:  GEN: No acute distress  LUNGS: Clear to auscultation bilaterally   HEART: S1/S2 present.   ABD: Soft, non-tender, non-distended. Bowel sounds present  EXT: NC/NC/NE/2+PP  NEURO: AAOX2

## 2018-12-11 NOTE — DISCHARGE NOTE ADULT - MEDICATION SUMMARY - MEDICATIONS TO CHANGE
I will SWITCH the dose or number of times a day I take the medications listed below when I get home from the hospital:    Keppra 1000 mg oral tablet  -- 1 tab(s) by mouth every 8 hours    phenytoin 200 mg oral capsule, extended release  -- 1 cap(s) by mouth every 8 hours

## 2018-12-11 NOTE — PROGRESS NOTE ADULT - ASSESSMENT
# MRSA bacteremia: resolved   - completed 14 days course of IV vancomycin,  -blood cultures after finishing abx are neg for 48 hours   - SAVANNAH negative for vegetations    # Bilateral pulmonary embolism / Extensive DVT in BL lower extremities:   on eliquis 5 BID   OPT follow up with chapin     # CVA:   - MRI brain showing acute/subacute frontal lobe lacunar infarcts and subdural hematoma with no mass effect   - CT head upon admission showed small infarct in the right frontal lobe and small lacunar infarcts within the basal ganglia and thalami  - Neurosurgery recommending continuing anticoagulation and no acute intervention  -discussed with neuro DR Enoc Younger and since there is no evidence of stenosis on MRA then no need for ASA and to continue with eliquis   -Lipitor 40 would not do 80 patient has transaminitis     # One episode of ??Coffee ground emesis in ER; Resolved  CBC stable spoke to GI and no further intervention and to follow up as OPT   on PPI     # Transaminitis:    - trending down  - nonsignificant abdominal US; Hepatitis workup negative    # Seizure:  - Continue with Dilantin/Keppra    # Hypertension; Chronic  - Controlled as inpatient    # Thiamine deficiency; Chronic  - Continue Thiamine PO 100mg daily     # Stage II sacral ulcer; Chronic  - No evidence of infection  - Continue daily wound care  - Off-loading protocol      DVT ppx: Eliquis  GI ppx: Protonix  Diet: dysphagia 1 puree , thin liquids     Dispo : discharge to SNF when bed available

## 2018-12-11 NOTE — DISCHARGE NOTE ADULT - HOSPITAL COURSE
57M, PMHx of seizure disorder, DLD, presents to the ED sp fall from NH. Pt was brought in by EMS from his nursing home due to a fall and then experienced SOB. It is unclear which event happened first, either the SOB or fall. During assessment patient was is respiratory distress and hypoxic to 80s on RA and tachypneic. Pt reports b/l lateral chest pain and difficulty breathing. Pt doesn't know how he fell, denies HT/LOC. Pt was unable to provide further history due to intubation  Pt was intubated in the ED for airway protection.   In ed, he was found to have b/l pe without strain, melina with HAGMA and lactic acidosis, coffee ground emesis.   admitted to the hospital and received thrombolysis ttt , course complicated by MRSA bacteriemia , received 14 days IV ABC per ID and blood cultures have been negative after that.   pt is medically stable and clinically cleared to be discharged to follow up as out patient. 57M, PMHx of seizure disorder, DLD, presents to the ED sp fall from NH. Pt was brought in by EMS from his nursing home due to a fall and then experienced SOB. It is unclear which event happened first, either the SOB or fall. During assessment patient was is respiratory distress and hypoxic to 80s on RA and tachypneic. Pt reports b/l lateral chest pain and difficulty breathing. Pt doesn't know how he fell, denies HT/LOC. Pt was unable to provide further history due to intubation. Pt was intubated in the ED for airway protection. In ed, he was found to have b/l pe without strain, melina with HAGMA and lactic acidosis, coffee ground emesis. Admitted to the hospital and received thrombolysis ttt , course complicated by MRSA bacteriemia , received 14 days IV ABC per ID and blood cultures have been negative after that. pt is medically stable and clinically cleared to be discharged to follow up as out patient. 57M, PMHx of seizure disorder, DLD, presents to the ED sp fall from NH. Pt was brought in by EMS from his nursing home due to a fall and then experienced SOB. It is unclear which event happened first, either the SOB or fall. During assessment patient was is respiratory distress and hypoxic to 80s on RA and tachypneic. Pt reports b/l lateral chest pain and difficulty breathing. Pt doesn't know how he fell, denies HT/LOC. Pt was unable to provide further history due to intubation. Pt was intubated in the ED for airway protection. In ed, he was found to have b/l pe without strain, melina with HAGMA and lactic acidosis, coffee ground emesis. Admitted to the hospital and received thrombolysis ttt , course complicated by MRSA bacteriemia , received 14 days IV ABC per ID and blood cultures have been negative after that. pt is medically stable and clinically cleared to be discharged to follow up as out patient. Pt will be discharged to [facility].

## 2018-12-11 NOTE — DISCHARGE NOTE ADULT - MEDICATION SUMMARY - MEDICATIONS TO STOP TAKING
I will STOP taking the medications listed below when I get home from the hospital:    Flomax 0.4 mg oral capsule  -- 1 cap(s) by mouth once a day    Norvasc 10 mg oral tablet  -- 1 tab(s) by mouth once a day    phenytoin 200 mg oral capsule, extended release  -- 1 cap(s) by mouth once a day (at bedtime)

## 2018-12-11 NOTE — PROGRESS NOTE ADULT - ASSESSMENT
# MRSA bacteremia:   - completed 14 days course of IV vancomycin,  blood cultures after ABC from 12/8 is negative  - SAVANNAH negative for vegetations    # Bilateral pulmonary embolism / Extensive DVT in BL lower extremities:   - CT Chest w/ IV contrast shows multiple bilateral acute pulmonary emboli involving all 5 lobes   - TTE shows no evidence of RIGHT heart strain  - on Eliquis 5 mg BID    # CVA:   - MRI brain showing acute/subacute frontal lobe lacunar infarcts and subdural hematoma with no mass effect   - CT head upon admission showed small infarct in the right frontal lobe and small lacunar infarcts within the basal ganglia and thalami  - Neurosurgery recommending continuing anticoagulation and no acute intervention  - MRA : no stenosis , no need for ASA  - Lipitor 40 mg    # One episode of Coffee ground emesis in ER; Resolved  - GI recommendations appreciated: Started on Protonix IV; switched to PO BID  - As per GI, AC benefit outweighs the risk of GI bleed at this time  - no intervention at this time    # Transaminitis:    - trending down  - nonsignificant abdominal US; Hepatitis workup negative    # Seizure:  - Continue with Dilantin/Keppra    # Hypertension; Chronic  - Controlled as inpatient    # Thiamine deficiency; Chronic  - Continue Thiamine PO 100mg daily     # Stage II sacral ulcer; Chronic  - No evidence of infection  - Continue daily wound care  - Off-loading protocol      DVT ppx: Eliquis  GI ppx: Protonix  Diet: dysphagia 1 puree , thin liquids   Dispo : pending Auth for SNF placement , no more labs per Medicine attending

## 2018-12-11 NOTE — PROGRESS NOTE ADULT - SUBJECTIVE AND OBJECTIVE BOX
no chest pain and no sob     Vital Signs Last 24 Hrs  T(C): 35.4 (11 Dec 2018 04:54), Max: 36.7 (10 Dec 2018 12:24)  T(F): 95.8 (11 Dec 2018 04:54), Max: 98.1 (10 Dec 2018 12:24)  HR: 94 (11 Dec 2018 04:54) (90 - 94)  BP: 137/71 (11 Dec 2018 04:54) (118/84 - 137/71)  BP(mean): --  RR: 18 (11 Dec 2018 04:54) (18 - 18)  SpO2: 100% (11 Dec 2018 06:46) (100% - 100%)    PHYSICAL EXAM:  GENERAL: NAD, well-developed  HEAD:  Atraumatic, Normocephalic  EYES: EOMI, PERRLA, conjunctiva and sclera clear  NECK: Supple, No JVD  CHEST/LUNG: Clear to auscultation bilaterally; No wheeze  HEART: Regular rate and rhythm; No murmurs, rubs, or gallops  ABDOMEN: Soft, Nontender, Nondistended; Bowel sounds present  EXTREMITIES:  2+ Peripheral Pulses, No clubbing, cyanosis, or edema  PSYCH: AAOx3  NEUROLOGY: non-focal                            9.8    8.46  )-----------( 570      ( 10 Dec 2018 07:39 )             30.7     12-10    141  |  102  |  7<L>  ----------------------------<  90  4.0   |  25  |  0.6<L>    Ca    8.9      10 Dec 2018 07:39          CARDIAC MARKERS ( 10 Dec 2018 07:39 )  x     / <0.01 ng/mL / 16 U/L / x     / <1.0 ng/mL        Culture - Blood (collected 09 Dec 2018 04:30)  Source: .Blood Blood-Peripheral  Preliminary Report (10 Dec 2018 13:01):    No growth to date.

## 2018-12-11 NOTE — DISCHARGE NOTE ADULT - CARE PROVIDER_API CALL
Elza Navarro), Geriatric Medicine; Internal Medicine  [address] [place], [place] [id number]  Phone: [phone]  Fax: [phone]    Judith Cabrera), Internal Medicine  [address] [place] [address]  Phone: [phone]  Fax: [phone] Elza Navarro), Geriatric Medicine; Internal Medicine  [address] [place], [place] [id number]  Phone: [phone]  Fax: [phone]    Judith Cabrera), Internal Medicine  [address] [place] [address]  Phone: [phone]  Fax: [phone]    Michael Almodovar), Critical Care Medicine; Internal Medicine; Pulmonary Disease; Sleep Medicine  [address] [place] [address]  Phone: [phone]  Fax: [phone]

## 2018-12-12 RX ADMIN — Medication 200 MILLIGRAM(S): at 06:07

## 2018-12-12 RX ADMIN — ATORVASTATIN CALCIUM 40 MILLIGRAM(S): 80 TABLET, FILM COATED ORAL at 21:17

## 2018-12-12 RX ADMIN — Medication 200 MILLIGRAM(S): at 23:32

## 2018-12-12 RX ADMIN — LEVETIRACETAM 1500 MILLIGRAM(S): 250 TABLET, FILM COATED ORAL at 06:06

## 2018-12-12 RX ADMIN — CHLORHEXIDINE GLUCONATE 1 APPLICATION(S): 213 SOLUTION TOPICAL at 06:12

## 2018-12-12 RX ADMIN — APIXABAN 5 MILLIGRAM(S): 2.5 TABLET, FILM COATED ORAL at 18:06

## 2018-12-12 RX ADMIN — Medication 1 MILLIGRAM(S): at 15:47

## 2018-12-12 RX ADMIN — LEVETIRACETAM 1500 MILLIGRAM(S): 250 TABLET, FILM COATED ORAL at 18:07

## 2018-12-12 RX ADMIN — APIXABAN 5 MILLIGRAM(S): 2.5 TABLET, FILM COATED ORAL at 06:05

## 2018-12-12 RX ADMIN — PANTOPRAZOLE SODIUM 40 MILLIGRAM(S): 20 TABLET, DELAYED RELEASE ORAL at 18:06

## 2018-12-12 RX ADMIN — Medication 100 MILLIGRAM(S): at 15:47

## 2018-12-12 RX ADMIN — PANTOPRAZOLE SODIUM 40 MILLIGRAM(S): 20 TABLET, DELAYED RELEASE ORAL at 06:06

## 2018-12-12 RX ADMIN — SENNA PLUS 2 TABLET(S): 8.6 TABLET ORAL at 21:17

## 2018-12-12 RX ADMIN — Medication 200 MILLIGRAM(S): at 18:06

## 2018-12-12 RX ADMIN — Medication 100 MILLIGRAM(S): at 06:06

## 2018-12-12 RX ADMIN — Medication 200 MILLIGRAM(S): at 15:48

## 2018-12-12 NOTE — PROGRESS NOTE ADULT - SUBJECTIVE AND OBJECTIVE BOX
SUBJECTIVE:    Patient is a 58y old Male who presents with a chief complaint of sob and fall (12 Dec 2018 09:21)  Currently admitted to medicine with the primary diagnosis of Pulmonary thromboembolism  Today is hospital day 19d. This morning he is resting comfortably in bed and reports no new issues or overnight events.  pt sleeping this AM , no complains , no chest pain or SOB.    PAST MEDICAL & SURGICAL HISTORY  HLD (hyperlipidemia)  HTN (hypertension)  Seizures  Unilateral inguinal hernia with obstruction and without gangrene, recurrence not specified  No significant past surgical history    SOCIAL HISTORY:  Negative for smoking/alcohol/drug use.     ALLERGIES:  No Known Allergies    MEDICATIONS:  STANDING MEDICATIONS  apixaban 5 milliGRAM(s) Oral every 12 hours  atorvastatin 40 milliGRAM(s) Oral at bedtime  chlorhexidine 4% Liquid 1 Application(s) Topical <User Schedule>  docusate sodium 100 milliGRAM(s) Oral two times a day  folic acid 1 milliGRAM(s) Oral daily  levETIRAcetam 1500 milliGRAM(s) Oral two times a day  pantoprazole    Tablet 40 milliGRAM(s) Oral two times a day  phenytoin   Capsule 200 milliGRAM(s) Oral four times a day  senna 2 Tablet(s) Oral at bedtime  thiamine 100 milliGRAM(s) Oral daily    PRN MEDICATIONS    VITALS:   T(F): 98  HR: 99  BP: 151/88  RR: 18  SpO2: 98%      PHYSICAL EXAM:  GEN: No acute distress  LUNGS: Clear to auscultation bilaterally   HEART: S1/S2 present.   ABD: Soft, non-tender, non-distended. Bowel sounds present  EXT: NC/NC/NE/2+PP  NEURO: AAOX2

## 2018-12-12 NOTE — PROGRESS NOTE ADULT - SUBJECTIVE AND OBJECTIVE BOX
no chest pain and no sob     Vital Signs Last 24 Hrs  T(C): 36.7 (12 Dec 2018 05:38), Max: 36.9 (11 Dec 2018 13:19)  T(F): 98 (12 Dec 2018 05:38), Max: 98.4 (11 Dec 2018 13:19)  HR: 99 (12 Dec 2018 05:38) (89 - 101)  BP: 151/88 (12 Dec 2018 05:38) (125/77 - 151/88)  BP(mean): --  RR: 18 (12 Dec 2018 05:38) (18 - 20)  SpO2: 98% (11 Dec 2018 19:49) (98% - 98%)    PHYSICAL EXAM:  GENERAL: NAD, well-developed  HEAD:  Atraumatic, Normocephalic  EYES: EOMI, PERRLA, conjunctiva and sclera clear  NECK: Supple, No JVD  Pulm: Clear to auscultation bilaterally; No wheeze  CV: Regular rate and rhythm; No murmurs, rubs, or gallops  GI Soft, Nontender, Nondistended; Bowel sounds present  EXTREMITIES:  2+ Peripheral Pulses, No clubbing, cyanosis, or edema  PSYCH: AAOx3  NEUROLOGY: non-focal      MEDICATIONS  (STANDING):  apixaban 5 milliGRAM(s) Oral every 12 hours  atorvastatin 40 milliGRAM(s) Oral at bedtime  chlorhexidine 4% Liquid 1 Application(s) Topical <User Schedule>  docusate sodium 100 milliGRAM(s) Oral two times a day  folic acid 1 milliGRAM(s) Oral daily  levETIRAcetam 1500 milliGRAM(s) Oral two times a day  pantoprazole    Tablet 40 milliGRAM(s) Oral two times a day  phenytoin   Capsule 200 milliGRAM(s) Oral four times a day  senna 2 Tablet(s) Oral at bedtime  thiamine 100 milliGRAM(s) Oral daily    MEDICATIONS  (PRN):

## 2018-12-12 NOTE — PROGRESS NOTE ADULT - ASSESSMENT
# MRSA bacteremia:   - completed 14 days course of IV vancomycin,  blood cultures after ABC from 12/8 remains negative.   - SAVANNAH negative for vegetations    # Bilateral pulmonary embolism / Extensive DVT in BL lower extremities:   - CT Chest w/ IV contrast shows multiple bilateral acute pulmonary emboli involving all 5 lobes   - TTE shows no evidence of RIGHT heart strain  - on Eliquis 5 mg BID    # CVA:   - MRI brain showing acute/subacute frontal lobe lacunar infarcts and subdural hematoma with no mass effect   - CT head upon admission showed small infarct in the right frontal lobe and small lacunar infarcts within the basal ganglia and thalami  - Neurosurgery recommending continuing anticoagulation and no acute intervention  - MRA : no stenosis , no need for ASA  - Lipitor 40 mg    # One episode of Coffee ground emesis in ER; Resolved  - GI recommendations appreciated: Started on Protonix IV; switched to PO BID  - As per GI, AC benefit outweighs the risk of GI bleed at this time  - no intervention at this time    # Transaminitis:    - trending down  - nonsignificant abdominal US; Hepatitis workup negative    # Seizure:  - Continue with Dilantin/Keppra    # Hypertension; Chronic  - Controlled as inpatient    # Thiamine deficiency; Chronic  - Continue Thiamine PO 100mg daily     # Stage II sacral ulcer; Chronic  - No evidence of infection  - Continue daily wound care  - Off-loading protocol      DVT ppx: Eliquis  GI ppx: Protonix  Diet: dysphagia 1 puree , thin liquids   Dispo : pending Auth for SNF placement , no more labs per Medicine attending

## 2018-12-12 NOTE — PROGRESS NOTE ADULT - ASSESSMENT
# MRSA bacteremia: resolved   - completed 14 days course of IV vancomycin,  -blood cultures after finishing abx are neg for 48 hours   - SAVANNAH negative for vegetations    # Bilateral pulmonary embolism / Extensive DVT in BL lower extremities:   on eliquis 5 BID   OPT follow up with [facility]     # CVA:   - MRI brain showing acute/subacute frontal lobe lacunar infarcts and subdural hematoma with no mass effect   - CT head upon admission showed small infarct in the right frontal lobe and small lacunar infarcts within the basal ganglia and thalami  - Neurosurgery recommending continuing anticoagulation and no acute intervention  -discussed with neuro DR Enoc Younger and since there is no evidence of stenosis on MRA then no need for ASA and to continue with eliquis   -Lipitor 40 would not do 80 patient has transaminitis     # One episode of ??Coffee ground emesis in ER; Resolved  CBC stable spoke to GI and no further intervention and to follow up as OPT   on PPI     # Transaminitis:    - trending down  - nonsignificant abdominal US; Hepatitis workup negative    # Seizure:  - Continue with Dilantin/Keppra    # Hypertension; Chronic  - Controlled as inpatient    # Thiamine deficiency; Chronic  - Continue Thiamine PO 100mg daily     # Stage II sacral ulcer; Chronic  - No evidence of infection  - Continue daily wound care  - Off-loading protocol      DVT ppx: Eliquis  GI ppx: Protonix  Diet: dysphagia 1 puree , thin liquids     Dispo : discharge to SNF when bed available   spent 31 min coordinating discharge

## 2018-12-13 VITALS
SYSTOLIC BLOOD PRESSURE: 135 MMHG | DIASTOLIC BLOOD PRESSURE: 87 MMHG | RESPIRATION RATE: 20 BRPM | HEART RATE: 104 BPM | TEMPERATURE: 98 F

## 2018-12-13 LAB
ANION GAP SERPL CALC-SCNC: 17 MMOL/L — HIGH (ref 7–14)
BASOPHILS # BLD AUTO: 0.07 K/UL — SIGNIFICANT CHANGE UP (ref 0–0.2)
BASOPHILS NFR BLD AUTO: 0.9 % — SIGNIFICANT CHANGE UP (ref 0–1)
BUN SERPL-MCNC: 10 MG/DL — SIGNIFICANT CHANGE UP (ref 10–20)
CALCIUM SERPL-MCNC: 8.9 MG/DL — SIGNIFICANT CHANGE UP (ref 8.5–10.1)
CHLORIDE SERPL-SCNC: 98 MMOL/L — SIGNIFICANT CHANGE UP (ref 98–110)
CO2 SERPL-SCNC: 23 MMOL/L — SIGNIFICANT CHANGE UP (ref 17–32)
CREAT SERPL-MCNC: 0.6 MG/DL — LOW (ref 0.7–1.5)
CULTURE RESULTS: SIGNIFICANT CHANGE UP
CULTURE RESULTS: SIGNIFICANT CHANGE UP
EOSINOPHIL # BLD AUTO: 0.93 K/UL — HIGH (ref 0–0.7)
EOSINOPHIL NFR BLD AUTO: 12 % — HIGH (ref 0–8)
GLUCOSE SERPL-MCNC: 105 MG/DL — HIGH (ref 70–99)
HCT VFR BLD CALC: 32.9 % — LOW (ref 42–52)
HGB BLD-MCNC: 10.6 G/DL — LOW (ref 14–18)
IMM GRANULOCYTES NFR BLD AUTO: 0.4 % — HIGH (ref 0.1–0.3)
LYMPHOCYTES # BLD AUTO: 2.59 K/UL — SIGNIFICANT CHANGE UP (ref 1.2–3.4)
LYMPHOCYTES # BLD AUTO: 33.3 % — SIGNIFICANT CHANGE UP (ref 20.5–51.1)
MCHC RBC-ENTMCNC: 29.2 PG — SIGNIFICANT CHANGE UP (ref 27–31)
MCHC RBC-ENTMCNC: 32.2 G/DL — SIGNIFICANT CHANGE UP (ref 32–37)
MCV RBC AUTO: 90.6 FL — SIGNIFICANT CHANGE UP (ref 80–94)
MONOCYTES # BLD AUTO: 0.91 K/UL — HIGH (ref 0.1–0.6)
MONOCYTES NFR BLD AUTO: 11.7 % — HIGH (ref 1.7–9.3)
NEUTROPHILS # BLD AUTO: 3.24 K/UL — SIGNIFICANT CHANGE UP (ref 1.4–6.5)
NEUTROPHILS NFR BLD AUTO: 41.7 % — LOW (ref 42.2–75.2)
NRBC # BLD: 0 /100 WBCS — SIGNIFICANT CHANGE UP (ref 0–0)
PLATELET # BLD AUTO: 493 K/UL — HIGH (ref 130–400)
POTASSIUM SERPL-MCNC: 4 MMOL/L — SIGNIFICANT CHANGE UP (ref 3.5–5)
POTASSIUM SERPL-SCNC: 4 MMOL/L — SIGNIFICANT CHANGE UP (ref 3.5–5)
RBC # BLD: 3.63 M/UL — LOW (ref 4.7–6.1)
RBC # FLD: 14.6 % — HIGH (ref 11.5–14.5)
SODIUM SERPL-SCNC: 138 MMOL/L — SIGNIFICANT CHANGE UP (ref 135–146)
SPECIMEN SOURCE: SIGNIFICANT CHANGE UP
SPECIMEN SOURCE: SIGNIFICANT CHANGE UP
WBC # BLD: 7.77 K/UL — SIGNIFICANT CHANGE UP (ref 4.8–10.8)
WBC # FLD AUTO: 7.77 K/UL — SIGNIFICANT CHANGE UP (ref 4.8–10.8)

## 2018-12-13 RX ADMIN — Medication 200 MILLIGRAM(S): at 11:13

## 2018-12-13 RX ADMIN — Medication 200 MILLIGRAM(S): at 05:38

## 2018-12-13 RX ADMIN — Medication 100 MILLIGRAM(S): at 05:38

## 2018-12-13 RX ADMIN — Medication 1 MILLIGRAM(S): at 11:13

## 2018-12-13 RX ADMIN — APIXABAN 5 MILLIGRAM(S): 2.5 TABLET, FILM COATED ORAL at 05:38

## 2018-12-13 RX ADMIN — PANTOPRAZOLE SODIUM 40 MILLIGRAM(S): 20 TABLET, DELAYED RELEASE ORAL at 05:38

## 2018-12-13 RX ADMIN — LEVETIRACETAM 1500 MILLIGRAM(S): 250 TABLET, FILM COATED ORAL at 05:38

## 2018-12-13 RX ADMIN — Medication 100 MILLIGRAM(S): at 11:13

## 2018-12-13 RX ADMIN — CHLORHEXIDINE GLUCONATE 1 APPLICATION(S): 213 SOLUTION TOPICAL at 05:39

## 2018-12-13 NOTE — PROGRESS NOTE ADULT - REASON FOR ADMISSION
AHRF 2/2 bilateral PE; Coffee ground emesis
sob and fall
AHRF 2/2 bilateral PE; Coffee ground emesis
AHRF 2/2 bilateral PE; Coffee ground emesis
sob and fall

## 2018-12-13 NOTE — PROGRESS NOTE ADULT - ASSESSMENT
#Disposition  - Awaiting insurance authorization for placement in [facility]    # MRSA bacteremia:   - completed 14 days course of IV vancomycin,  blood cultures after ABC from 12/8 remains negative.   - SAVANNAH negative for vegetations    # Bilateral pulmonary embolism / Extensive DVT in BL lower extremities:   - CT Chest w/ IV contrast shows multiple bilateral acute pulmonary emboli involving all 5 lobes   - TTE shows no evidence of RIGHT heart strain  - on Eliquis 5 mg BID    # CVA:   - MRI brain showing acute/subacute frontal lobe lacunar infarcts and subdural hematoma with no mass effect   - CT head upon admission showed small infarct in the right frontal lobe and small lacunar infarcts within the basal ganglia and thalami  - Neurosurgery recommending continuing anticoagulation and no acute intervention  - MRA : no stenosis , no need for ASA  - Lipitor 40 mg    # One episode of Coffee ground emesis in ER; Resolved  - GI recommendations appreciated: Started on Protonix IV; switched to PO BID  - As per GI, AC benefit outweighs the risk of GI bleed at this time  - no intervention at this time    # Transaminitis:    - trending down  - nonsignificant abdominal US; Hepatitis workup negative    # Seizure:  - Continue with Dilantin/Keppra    # Hypertension; Chronic  - Controlled as inpatient    # Thiamine deficiency; Chronic  - Continue Thiamine PO 100mg daily     # Stage II sacral ulcer; Chronic  - No evidence of infection  - Continue daily wound care  - Off-loading protocol      DVT ppx: Eliquis  GI ppx: Protonix  Diet: dysphagia 1 puree , thin liquids   Dispo : pending Auth for SNF placement , no more labs per Medicine attending

## 2018-12-13 NOTE — PROGRESS NOTE ADULT - SUBJECTIVE AND OBJECTIVE BOX
SUBJECTIVE:    Patient is a 58y old Male who presents with a chief complaint of sob and fall (12 Dec 2018 14:50)    Currently admitted to medicine with the primary diagnosis of Pulmonary thromboembolism     Today is hospital day 20d. This morning he is resting comfortably in bed and reports no new issues or overnight events.     PAST MEDICAL & SURGICAL HISTORY  HLD (hyperlipidemia)  HTN (hypertension)  Seizures  Unilateral inguinal hernia with obstruction and without gangrene, recurrence not specified  No significant past surgical history    SOCIAL HISTORY:  Negative for smoking/alcohol/drug use.     ALLERGIES:  No Known Allergies    MEDICATIONS:  STANDING MEDICATIONS  apixaban 5 milliGRAM(s) Oral every 12 hours  atorvastatin 40 milliGRAM(s) Oral at bedtime  chlorhexidine 4% Liquid 1 Application(s) Topical <User Schedule>  docusate sodium 100 milliGRAM(s) Oral two times a day  folic acid 1 milliGRAM(s) Oral daily  levETIRAcetam 1500 milliGRAM(s) Oral two times a day  pantoprazole    Tablet 40 milliGRAM(s) Oral two times a day  phenytoin   Capsule 200 milliGRAM(s) Oral four times a day  senna 2 Tablet(s) Oral at bedtime  thiamine 100 milliGRAM(s) Oral daily    PRN MEDICATIONS    VITALS:   T(F): 97.2  HR: 99  BP: 133/82  RR: 19  SpO2: 98%    LABS:                        RADIOLOGY:    PHYSICAL EXAM:  GEN: No acute distress, lying comfortably in bed   LUNGS: Clear to auscultation bilaterally, no labored breathing   HEART: S1/S2 present. RRR.   ABD: Soft, non-tender, non-distended. Bowel sounds present.   EXT: Noncyanotic, nonedematous, 2+ peripheral pulses, skin intact   NEURO: AAOX3, CN II-XII grossly intact     Lines/Tubes </u  Wagner cathter:

## 2018-12-13 NOTE — PROGRESS NOTE ADULT - ATTENDING COMMENTS
patient seen and examined independently  patient stable for discharge  spent 32 min coordinating discharge

## 2018-12-13 NOTE — PROGRESS NOTE ADULT - PROVIDER SPECIALTY LIST ADULT
Burn
CCU
Cardiology
Critical Care
Hospitalist
Infectious Disease
Internal Medicine
Nephrology
Nephrology
Neurology
Neurology
Pulmonology
Internal Medicine

## 2018-12-14 LAB
CULTURE RESULTS: SIGNIFICANT CHANGE UP
SPECIMEN SOURCE: SIGNIFICANT CHANGE UP

## 2018-12-24 DIAGNOSIS — I26.99 OTHER PULMONARY EMBOLISM WITHOUT ACUTE COR PULMONALE: ICD-10-CM

## 2018-12-24 DIAGNOSIS — E78.5 HYPERLIPIDEMIA, UNSPECIFIED: ICD-10-CM

## 2018-12-24 DIAGNOSIS — N18.9 CHRONIC KIDNEY DISEASE, UNSPECIFIED: ICD-10-CM

## 2018-12-24 DIAGNOSIS — E87.5 HYPERKALEMIA: ICD-10-CM

## 2018-12-24 DIAGNOSIS — I82.433 ACUTE EMBOLISM AND THROMBOSIS OF POPLITEAL VEIN, BILATERAL: ICD-10-CM

## 2018-12-24 DIAGNOSIS — E87.0 HYPEROSMOLALITY AND HYPERNATREMIA: ICD-10-CM

## 2018-12-24 DIAGNOSIS — D72.829 ELEVATED WHITE BLOOD CELL COUNT, UNSPECIFIED: ICD-10-CM

## 2018-12-24 DIAGNOSIS — J96.01 ACUTE RESPIRATORY FAILURE WITH HYPOXIA: ICD-10-CM

## 2018-12-24 DIAGNOSIS — A41.02 SEPSIS DUE TO METHICILLIN RESISTANT STAPHYLOCOCCUS AUREUS: ICD-10-CM

## 2018-12-24 DIAGNOSIS — E87.2 ACIDOSIS: ICD-10-CM

## 2018-12-24 DIAGNOSIS — I63.9 CEREBRAL INFARCTION, UNSPECIFIED: ICD-10-CM

## 2018-12-24 DIAGNOSIS — I82.413 ACUTE EMBOLISM AND THROMBOSIS OF FEMORAL VEIN, BILATERAL: ICD-10-CM

## 2018-12-24 DIAGNOSIS — R11.10 VOMITING, UNSPECIFIED: ICD-10-CM

## 2018-12-24 DIAGNOSIS — S32.039A UNSPECIFIED FRACTURE OF THIRD LUMBAR VERTEBRA, INITIAL ENCOUNTER FOR CLOSED FRACTURE: ICD-10-CM

## 2018-12-24 DIAGNOSIS — D64.9 ANEMIA, UNSPECIFIED: ICD-10-CM
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
## 2018-12-24 DIAGNOSIS — E51.9 THIAMINE DEFICIENCY, UNSPECIFIED: ICD-10-CM

## 2018-12-24 DIAGNOSIS — I70.1 ATHEROSCLEROSIS OF RENAL ARTERY: ICD-10-CM

## 2018-12-24 DIAGNOSIS — S32.029A UNSPECIFIED FRACTURE OF SECOND LUMBAR VERTEBRA, INITIAL ENCOUNTER FOR CLOSED FRACTURE: ICD-10-CM

## 2018-12-24 DIAGNOSIS — N17.0 ACUTE KIDNEY FAILURE WITH TUBULAR NECROSIS: ICD-10-CM

## 2018-12-24 DIAGNOSIS — I62.9 NONTRAUMATIC INTRACRANIAL HEMORRHAGE, UNSPECIFIED: ICD-10-CM

## 2018-12-24 DIAGNOSIS — R74.0 NONSPECIFIC ELEVATION OF LEVELS OF TRANSAMINASE AND LACTIC ACID DEHYDROGENASE [LDH]: ICD-10-CM

## 2018-12-24 DIAGNOSIS — G40.909 EPILEPSY, UNSPECIFIED, NOT INTRACTABLE, WITHOUT STATUS EPILEPTICUS: ICD-10-CM

## 2018-12-24 DIAGNOSIS — L89.153 PRESSURE ULCER OF SACRAL REGION, STAGE 3: ICD-10-CM

## 2018-12-24 DIAGNOSIS — R13.10 DYSPHAGIA, UNSPECIFIED: ICD-10-CM
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
## 2018-12-24 DIAGNOSIS — I12.9 HYPERTENSIVE CHRONIC KIDNEY DISEASE WITH STAGE 1 THROUGH STAGE 4 CHRONIC KIDNEY DISEASE, OR UNSPECIFIED CHRONIC KIDNEY DISEASE: ICD-10-CM
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
## 2019-01-08 PROBLEM — I10 ESSENTIAL (PRIMARY) HYPERTENSION: Chronic | Status: ACTIVE | Noted: 2018-11-23

## 2019-01-08 PROBLEM — Z00.00 ENCOUNTER FOR PREVENTIVE HEALTH EXAMINATION: Status: ACTIVE | Noted: 2019-01-08

## 2019-01-08 PROBLEM — E78.5 HYPERLIPIDEMIA, UNSPECIFIED: Chronic | Status: ACTIVE | Noted: 2018-11-23

## 2019-01-08 PROBLEM — R56.9 UNSPECIFIED CONVULSIONS: Chronic | Status: ACTIVE | Noted: 2018-11-23

## 2019-01-15 ENCOUNTER — APPOINTMENT (OUTPATIENT)
Dept: PULMONOLOGY | Facility: CLINIC | Age: 59
End: 2019-01-15

## 2019-02-01 ENCOUNTER — APPOINTMENT (OUTPATIENT)
Dept: GASTROENTEROLOGY | Facility: CLINIC | Age: 59
End: 2019-02-01

## 2019-04-19 ENCOUNTER — APPOINTMENT (OUTPATIENT)
Dept: PULMONOLOGY | Facility: CLINIC | Age: 59
End: 2019-04-19

## 2019-04-19 ENCOUNTER — OUTPATIENT (OUTPATIENT)
Dept: OUTPATIENT SERVICES | Facility: HOSPITAL | Age: 59
LOS: 1 days | Discharge: HOME | End: 2019-04-19

## 2019-04-19 VITALS
DIASTOLIC BLOOD PRESSURE: 75 MMHG | HEIGHT: 74.5 IN | OXYGEN SATURATION: 93 % | HEART RATE: 102 BPM | SYSTOLIC BLOOD PRESSURE: 104 MMHG | TEMPERATURE: 97.4 F

## 2019-04-19 DIAGNOSIS — I10 ESSENTIAL (PRIMARY) HYPERTENSION: ICD-10-CM

## 2019-04-19 DIAGNOSIS — R56.9 UNSPECIFIED CONVULSIONS: ICD-10-CM

## 2019-04-19 DIAGNOSIS — R42 DIZZINESS AND GIDDINESS: ICD-10-CM

## 2019-04-19 RX ORDER — APIXABAN 5 MG/1
5 TABLET, FILM COATED ORAL
Qty: 60 | Refills: 3 | Status: ACTIVE | COMMUNITY

## 2019-04-19 RX ORDER — MECLIZINE HYDROCHLORIDE 25 MG/1
25 TABLET ORAL 3 TIMES DAILY
Refills: 0 | Status: ACTIVE | COMMUNITY

## 2019-04-19 RX ORDER — EXTENDED PHENYTOIN SODIUM 100 MG/1
100 CAPSULE ORAL 3 TIMES DAILY
Refills: 0 | Status: ACTIVE | COMMUNITY

## 2019-04-19 RX ORDER — FOLIC ACID 1 MG/1
1 TABLET ORAL
Qty: 30 | Refills: 2 | Status: ACTIVE | COMMUNITY

## 2019-04-19 RX ORDER — LEVETIRACETAM 750 MG/1
750 TABLET, FILM COATED ORAL TWICE DAILY
Refills: 0 | Status: ACTIVE | COMMUNITY

## 2019-04-19 NOTE — ASSESSMENT
[FreeTextEntry1] : # Bilateral pulmonary embolism / Extensive DVT in BL lower extremities\par - CT Chest w/ IV 11/18 contrast showed multiple bilateral acute pulmonary emboli involving all 5 lobes\par  - TTE shows no evidence of RIGHT heart strain \par - c/w Eliquis 5 mg BID lifelong, pt is wheelchair bound\par - Benefits of A/C given his nonambulatory status outweights risk of A/C\par - If pt develops GIB, may need IVC filter\par - f/u with GI. ? Colonoscopy \par - RTC as needed \par

## 2019-04-19 NOTE — HISTORY OF PRESENT ILLNESS
[FreeTextEntry1] : 58y old Male with [facility] admission for SOB and fall on 11/18  showed multilobar extensive PE w/ multiple bilateral acute pulmonary emboli involving all 5 lobes, no evidence of RIGHT heart strain  and bilateral DVT dx 12/18 on Eliquis 5 mg BID, CVA subacute SDH, MRSA bacteremia ( neg SAVANNAH),  Seizure, HTN, DLD, ambulatory dysfunction- wheel chair bound, dementia presents for an initial consult.\par \par Pt is unable to provide hx and doesn’t recall why he was hospitalized. Aide with him doesn’t know how long the patient's been in the wheelchair. Unable to provide family hx. \par \par CTA chest  (11/18)\par IMPRESSION: \par \par 1. Multiple bilateral acute pulmonary emboli involving all 5 lobes of the \par lungs as above. No definite CT evidence of right heart strain.\par \par 2. Compression fractures of T4, T5, T6, and T8 of indeterminate age. \par Minimally displaced right L3 transverse process fracture.\par \par 3. Severe stenosis at the origin of the left renal artery with \par poststenotic dilatation.\par \par MR brain (11/180\par 1. Small, likely subacute, subdural hematoma underlying the right \par parietal craniotomy without significant adjacent mass effect.\par \par 2. Acute/subacute lacunar infarction in the right frontal lobe.\par \par 3. Extensive chronic microvascular ischemic changes and small chronic \par infarctions.\par

## 2019-04-19 NOTE — PHYSICAL EXAM
[General Appearance - Well Developed] : well developed [General Appearance - Well Nourished] : well nourished [Normal Appearance] : normal appearance [Heart Rate And Rhythm] : heart rate and rhythm were normal [Heart Sounds] : normal S1 and S2 [Murmurs] : no murmurs present [] : no respiratory distress [Respiration, Rhythm And Depth] : normal respiratory rhythm and effort [Nail Clubbing] : no clubbing of the fingernails [Exaggerated Use Of Accessory Muscles For Inspiration] : no accessory muscle use [Cyanosis, Localized] : no localized cyanosis [FreeTextEntry1] : alert

## 2019-04-19 NOTE — END OF VISIT
[] : Resident [FreeTextEntry3] : pe////immobile-lifelong ac is required [Time Spent: ___ minutes] : I have spent [unfilled] minutes of face to face time with the patient

## 2020-08-02 NOTE — PROGRESS NOTE ADULT - MS EXT PE MLT D E PC
pedal edema
pedal edema/no cyanosis
pedal edema/no cyanosis
no cyanosis/pedal edema
pedal edema
no cyanosis
pedal edema
pedal edema
oral

## 2020-09-22 ENCOUNTER — OUTPATIENT (OUTPATIENT)
Dept: OUTPATIENT SERVICES | Facility: HOSPITAL | Age: 60
LOS: 1 days | Discharge: HOME | End: 2020-09-22
Payer: MEDICARE

## 2020-09-22 ENCOUNTER — APPOINTMENT (OUTPATIENT)
Dept: CARDIOLOGY | Facility: CLINIC | Age: 60
End: 2020-09-22
Payer: MEDICARE

## 2020-09-22 VITALS
HEIGHT: 74.5 IN | SYSTOLIC BLOOD PRESSURE: 111 MMHG | DIASTOLIC BLOOD PRESSURE: 80 MMHG | TEMPERATURE: 98.2 F | HEART RATE: 88 BPM

## 2020-09-22 DIAGNOSIS — R94.31 ABNORMAL ELECTROCARDIOGRAM [ECG] [EKG]: ICD-10-CM

## 2020-09-22 PROCEDURE — 99213 OFFICE O/P EST LOW 20 MIN: CPT

## 2020-09-22 PROCEDURE — 93010 ELECTROCARDIOGRAM REPORT: CPT

## 2020-09-22 RX ORDER — MULTIVITAMIN
TABLET ORAL
Refills: 0 | Status: ACTIVE | COMMUNITY

## 2020-09-22 RX ORDER — FAMOTIDINE 40 MG/1
40 TABLET, FILM COATED ORAL
Refills: 0 | Status: ACTIVE | COMMUNITY

## 2020-09-22 RX ORDER — MEMANTINE HYDROCHLORIDE 28 MG/1
28 CAPSULE, EXTENDED RELEASE ORAL
Refills: 0 | Status: ACTIVE | COMMUNITY

## 2020-09-22 RX ORDER — MIRTAZAPINE 15 MG/1
15 TABLET, FILM COATED ORAL
Refills: 0 | Status: ACTIVE | COMMUNITY

## 2020-09-22 RX ORDER — CALCIUM CARBONATE/VITAMIN D3 600 MG-10
600-400 TABLET ORAL
Refills: 0 | Status: ACTIVE | COMMUNITY

## 2020-09-22 RX ORDER — PANTOPRAZOLE SODIUM 40 MG/1
40 TABLET, DELAYED RELEASE ORAL
Refills: 0 | Status: ACTIVE | COMMUNITY

## 2020-09-22 RX ORDER — ATORVASTATIN CALCIUM 40 MG/1
40 TABLET, FILM COATED ORAL
Refills: 0 | Status: ACTIVE | COMMUNITY

## 2020-09-22 RX ORDER — GLUCOSAMINE/MSM/CHONDROIT SULF 500-166.6
10 TABLET ORAL
Refills: 0 | Status: ACTIVE | COMMUNITY

## 2020-09-22 RX ORDER — SENNOSIDES 8.6 MG TABLETS 8.6 MG/1
8.6 TABLET ORAL
Refills: 0 | Status: ACTIVE | COMMUNITY

## 2020-09-22 RX ORDER — MULTIVIT-MIN/FOLIC/VIT K/LYCOP 400-300MCG
500 TABLET ORAL
Refills: 0 | Status: ACTIVE | COMMUNITY

## 2020-09-22 RX ORDER — ACETAMINOPHEN 325 MG/1
TABLET, FILM COATED ORAL
Refills: 0 | Status: ACTIVE | COMMUNITY

## 2020-09-22 RX ORDER — TAMSULOSIN HYDROCHLORIDE 0.4 MG/1
0.4 CAPSULE ORAL
Refills: 0 | Status: ACTIVE | COMMUNITY

## 2020-09-22 RX ORDER — DONEPEZIL HYDROCHLORIDE 10 MG/1
10 TABLET ORAL
Refills: 0 | Status: ACTIVE | COMMUNITY

## 2020-09-22 RX ORDER — ZOLPIDEM TARTRATE 5 MG/1
5 TABLET, FILM COATED ORAL
Refills: 0 | Status: ACTIVE | COMMUNITY

## 2020-09-22 RX ORDER — MEMANTINE HYDROCHLORIDE 5 MG/1
5 TABLET, FILM COATED ORAL DAILY
Refills: 0 | Status: DISCONTINUED | COMMUNITY
End: 2020-09-22

## 2020-09-22 RX ORDER — THIAMINE HCL 50 MG
TABLET ORAL
Refills: 0 | Status: ACTIVE | COMMUNITY

## 2020-09-22 NOTE — ASSESSMENT
[FreeTextEntry1] : Patient is a 60yo male with PMH of hypertension, hyperlipidemia, traumatic brain injury, traumatic subdural hematoma, cerebral infarction, history of DVT and PE, GERD, right knee injury, major depressive disorder, and BPH who presents to the cardiology clinic for evaluation of history of CVA and TBI. \par \par #Abnormal ECG with poor R-wave progression and Q waves in lead III\par - 2D echocardiogram\par - If no wall abnormalities, then no further workup indicated\par - If wall abnormalities present, would recommend nuclear scan

## 2020-09-22 NOTE — PHYSICAL EXAM
[General Appearance - Well Developed] : well developed [Normal Appearance] : normal appearance [Well Groomed] : well groomed [General Appearance - Well Nourished] : well nourished [No Deformities] : no deformities [General Appearance - In No Acute Distress] : no acute distress [Heart Rate And Rhythm] : heart rate and rhythm were normal [Heart Sounds] : normal S1 and S2 [Murmurs] : no murmurs present [] : no respiratory distress [Auscultation Breath Sounds / Voice Sounds] : lungs were clear to auscultation bilaterally [Nail Clubbing] : no clubbing of the fingernails [Cyanosis, Localized] : no localized cyanosis [FreeTextEntry1] : 5/5 muscle strength in bilateral lower extremities

## 2020-09-22 NOTE — HISTORY OF PRESENT ILLNESS
[FreeTextEntry1] : Patient is a 60yo male with PMH of hypertension, hyperlipidemia, traumatic brain injury, traumatic subdural hematoma, cerebral infarction, history of DVT and PE, GERD, right knee injury, major depressive disorder, and BPH who presents to the clinic for evaluation regarding his history of stroke. He is unsure why he is here and the health aide does not know either. He denies any chest pain, dyspnea with exertion, palpitations, dizziness, lightheadedness.

## 2020-12-08 ENCOUNTER — APPOINTMENT (OUTPATIENT)
Dept: CARDIOLOGY | Facility: CLINIC | Age: 60
End: 2020-12-08

## 2020-12-15 ENCOUNTER — OUTPATIENT (OUTPATIENT)
Dept: OUTPATIENT SERVICES | Facility: HOSPITAL | Age: 60
LOS: 1 days | Discharge: HOME | End: 2020-12-15

## 2020-12-15 DIAGNOSIS — Z11.59 ENCOUNTER FOR SCREENING FOR OTHER VIRAL DISEASES: ICD-10-CM

## 2020-12-18 ENCOUNTER — OUTPATIENT (OUTPATIENT)
Dept: OUTPATIENT SERVICES | Facility: HOSPITAL | Age: 60
LOS: 1 days | Discharge: HOME | End: 2020-12-18
Payer: MEDICARE

## 2020-12-18 DIAGNOSIS — R94.31 ABNORMAL ELECTROCARDIOGRAM [ECG] [EKG]: ICD-10-CM

## 2020-12-18 PROCEDURE — 93306 TTE W/DOPPLER COMPLETE: CPT | Mod: 26

## 2021-03-09 ENCOUNTER — OUTPATIENT (OUTPATIENT)
Dept: OUTPATIENT SERVICES | Facility: HOSPITAL | Age: 61
LOS: 1 days | Discharge: HOME | End: 2021-03-09

## 2021-03-09 ENCOUNTER — APPOINTMENT (OUTPATIENT)
Dept: CARDIOLOGY | Facility: CLINIC | Age: 61
End: 2021-03-09
Payer: MEDICARE

## 2021-03-09 VITALS
HEIGHT: 74.5 IN | DIASTOLIC BLOOD PRESSURE: 95 MMHG | BODY MASS INDEX: 27.17 KG/M2 | HEART RATE: 83 BPM | TEMPERATURE: 98.5 F | WEIGHT: 214 LBS | SYSTOLIC BLOOD PRESSURE: 127 MMHG

## 2021-03-09 DIAGNOSIS — R94.31 ABNORMAL ELECTROCARDIOGRAM [ECG] [EKG]: ICD-10-CM

## 2021-03-09 DIAGNOSIS — E78.5 HYPERLIPIDEMIA, UNSPECIFIED: ICD-10-CM

## 2021-03-09 PROCEDURE — 99212 OFFICE O/P EST SF 10 MIN: CPT

## 2021-03-09 NOTE — HISTORY OF PRESENT ILLNESS
[FreeTextEntry1] : 60 year old male with PMH of hypertension, hyperlipidemia, traumatic brain injury, traumatic subdural hematoma, cerebral infarction, history of DVT and PE, GERD, right knee injury, major depressive disorder, and BPH who presents for follow up.  Today he is very agitated, and violent upon entering the room, yelling profanities, making threats to staff, purposely jumping off his wheelchair before being able to be examined.  His Echo was reviewed that occurred since the last visit and it was with normal EF and no wall motion abnormalities or valvular defects, though was a technically difficult study.  His VS were stable and there are no changes to his medications. He was unable to be examined due to his agitation and did not produce any medical complaints.

## 2021-03-09 NOTE — PHYSICAL EXAM
[General Appearance - Well Developed] : well developed [General Appearance - Well Nourished] : well nourished [No Deformities] : no deformities [Auscultation Breath Sounds / Voice Sounds] : lungs were clear to auscultation bilaterally [FreeTextEntry1] : Severely agitated, yelling profanities

## 2021-03-09 NOTE — ASSESSMENT
[FreeTextEntry1] : 60 year old male with PMH of hypertension, hyperlipidemia, traumatic brain injury, traumatic subdural hematoma, cerebral infarction, history of DVT and PE, GERD, right knee injury, major depressive disorder, and BPH who presents for follow up.\par \par #) Abnormal ECG with poor R-wave progression and Q waves in lead III\par -Patient unable to be examined due to severe agitation \par -2D echocardiogram with normal EF, no valvular defects, no wall motion abnormalities \par -BP well controlled from previous visits, not on medications\par -Follow up in 6 months

## 2021-03-19 DIAGNOSIS — E78.5 HYPERLIPIDEMIA, UNSPECIFIED: ICD-10-CM

## 2021-03-19 DIAGNOSIS — R94.31 ABNORMAL ELECTROCARDIOGRAM [ECG] [EKG]: ICD-10-CM

## 2022-01-01 ENCOUNTER — INPATIENT (INPATIENT)
Facility: HOSPITAL | Age: 62
LOS: 3 days | Discharge: HOME | End: 2022-12-13
Attending: INTERNAL MEDICINE | Admitting: INTERNAL MEDICINE

## 2022-01-01 ENCOUNTER — TRANSCRIPTION ENCOUNTER (OUTPATIENT)
Age: 62
End: 2022-01-01

## 2022-01-01 ENCOUNTER — APPOINTMENT (OUTPATIENT)
Dept: UROLOGY | Facility: CLINIC | Age: 62
End: 2022-01-01

## 2022-01-01 VITALS
SYSTOLIC BLOOD PRESSURE: 92 MMHG | TEMPERATURE: 99 F | OXYGEN SATURATION: 95 % | DIASTOLIC BLOOD PRESSURE: 55 MMHG | HEART RATE: 80 BPM | RESPIRATION RATE: 18 BRPM

## 2022-01-01 VITALS
DIASTOLIC BLOOD PRESSURE: 74 MMHG | SYSTOLIC BLOOD PRESSURE: 125 MMHG | TEMPERATURE: 98.2 F | BODY MASS INDEX: 27.46 KG/M2 | HEART RATE: 81 BPM | OXYGEN SATURATION: 98 % | HEIGHT: 74 IN | WEIGHT: 214 LBS | RESPIRATION RATE: 16 BRPM

## 2022-01-01 VITALS
SYSTOLIC BLOOD PRESSURE: 116 MMHG | DIASTOLIC BLOOD PRESSURE: 64 MMHG | HEART RATE: 103 BPM | RESPIRATION RATE: 18 BRPM | TEMPERATURE: 97 F

## 2022-01-01 DIAGNOSIS — I26.99 OTHER PULMONARY EMBOLISM WITHOUT ACUTE COR PULMONALE: ICD-10-CM

## 2022-01-01 DIAGNOSIS — Z86.79 PERSONAL HISTORY OF OTHER DISEASES OF THE CIRCULATORY SYSTEM: Chronic | ICD-10-CM

## 2022-01-01 DIAGNOSIS — N30.91 CYSTITIS, UNSPECIFIED WITH HEMATURIA: ICD-10-CM

## 2022-01-01 DIAGNOSIS — Z87.19 PERSONAL HISTORY OF OTHER DISEASES OF THE DIGESTIVE SYSTEM: ICD-10-CM

## 2022-01-01 DIAGNOSIS — R31.0 GROSS HEMATURIA: ICD-10-CM

## 2022-01-01 DIAGNOSIS — M48.54XD COLLAPSED VERTEBRA, NOT ELSEWHERE CLASSIFIED, THORACIC REGION, SUBSEQUENT ENCOUNTER FOR FRACTURE WITH ROUTINE HEALING: ICD-10-CM

## 2022-01-01 DIAGNOSIS — K76.9 LIVER DISEASE, UNSPECIFIED: ICD-10-CM

## 2022-01-01 DIAGNOSIS — S32.039D UNSPECIFIED FRACTURE OF THIRD LUMBAR VERTEBRA, SUBSEQUENT ENCOUNTER FOR FRACTURE WITH ROUTINE HEALING: ICD-10-CM

## 2022-01-01 DIAGNOSIS — R31.9 HEMATURIA, UNSPECIFIED: ICD-10-CM

## 2022-01-01 DIAGNOSIS — Z86.718 PERSONAL HISTORY OF OTHER VENOUS THROMBOSIS AND EMBOLISM: ICD-10-CM

## 2022-01-01 DIAGNOSIS — E78.5 HYPERLIPIDEMIA, UNSPECIFIED: ICD-10-CM

## 2022-01-01 DIAGNOSIS — Z86.711 PERSONAL HISTORY OF PULMONARY EMBOLISM: ICD-10-CM

## 2022-01-01 DIAGNOSIS — I13.0 HYPERTENSIVE HEART AND CHRONIC KIDNEY DISEASE WITH HEART FAILURE AND STAGE 1 THROUGH STAGE 4 CHRONIC KIDNEY DISEASE, OR UNSPECIFIED CHRONIC KIDNEY DISEASE: ICD-10-CM

## 2022-01-01 DIAGNOSIS — N18.9 CHRONIC KIDNEY DISEASE, UNSPECIFIED: ICD-10-CM

## 2022-01-01 DIAGNOSIS — G40.909 EPILEPSY, UNSPECIFIED, NOT INTRACTABLE, WITHOUT STATUS EPILEPTICUS: ICD-10-CM

## 2022-01-01 DIAGNOSIS — I95.9 HYPOTENSION, UNSPECIFIED: ICD-10-CM

## 2022-01-01 DIAGNOSIS — S22.39XA FRACTURE OF ONE RIB, UNSPECIFIED SIDE, INITIAL ENCOUNTER FOR CLOSED FRACTURE: ICD-10-CM

## 2022-01-01 DIAGNOSIS — S22.41XD MULTIPLE FRACTURES OF RIBS, RIGHT SIDE, SUBSEQUENT ENCOUNTER FOR FRACTURE WITH ROUTINE HEALING: ICD-10-CM

## 2022-01-01 DIAGNOSIS — Z78.9 OTHER SPECIFIED HEALTH STATUS: ICD-10-CM

## 2022-01-01 DIAGNOSIS — N40.0 BENIGN PROSTATIC HYPERPLASIA WITHOUT LOWER URINARY TRACT SYMPMS: ICD-10-CM

## 2022-01-01 DIAGNOSIS — I50.9 HEART FAILURE, UNSPECIFIED: ICD-10-CM

## 2022-01-01 DIAGNOSIS — N28.9 DISORDER OF KIDNEY AND URETER, UNSPECIFIED: ICD-10-CM

## 2022-01-01 DIAGNOSIS — I62.00 NONTRAUMATIC SUBDURAL HEMORRHAGE, UNSPECIFIED: ICD-10-CM

## 2022-01-01 DIAGNOSIS — N20.0 CALCULUS OF KIDNEY: ICD-10-CM

## 2022-01-01 DIAGNOSIS — Z91.013 ALLERGY TO SEAFOOD: ICD-10-CM

## 2022-01-01 DIAGNOSIS — Z87.891 PERSONAL HISTORY OF NICOTINE DEPENDENCE: ICD-10-CM

## 2022-01-01 DIAGNOSIS — I26.99 OTHER PULMONARY EMBOLISM W/OUT ACUTE COR PULMONALE: ICD-10-CM

## 2022-01-01 DIAGNOSIS — Z79.01 LONG TERM (CURRENT) USE OF ANTICOAGULANTS: ICD-10-CM
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
## 2022-01-01 DIAGNOSIS — R35.0 FREQUENCY OF MICTURITION: ICD-10-CM

## 2022-01-01 DIAGNOSIS — N39.0 URINARY TRACT INFECTION, SITE NOT SPECIFIED: ICD-10-CM
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
## 2022-01-01 DIAGNOSIS — Z98.890 OTHER SPECIFIED POSTPROCEDURAL STATES: Chronic | ICD-10-CM

## 2022-01-01 LAB
-  AMIKACIN: SIGNIFICANT CHANGE UP
-  AMOXICILLIN/CLAVULANIC ACID: SIGNIFICANT CHANGE UP
-  AMPICILLIN/SULBACTAM: SIGNIFICANT CHANGE UP
-  AMPICILLIN: SIGNIFICANT CHANGE UP
-  AZTREONAM: SIGNIFICANT CHANGE UP
-  CEFAZOLIN: SIGNIFICANT CHANGE UP
-  CEFEPIME: SIGNIFICANT CHANGE UP
-  CEFTRIAXONE: SIGNIFICANT CHANGE UP
-  CIPROFLOXACIN: SIGNIFICANT CHANGE UP
-  ERTAPENEM: SIGNIFICANT CHANGE UP
-  GENTAMICIN: SIGNIFICANT CHANGE UP
-  LEVOFLOXACIN: SIGNIFICANT CHANGE UP
-  MEROPENEM: SIGNIFICANT CHANGE UP
-  NITROFURANTOIN: SIGNIFICANT CHANGE UP
-  PIPERACILLIN/TAZOBACTAM: SIGNIFICANT CHANGE UP
-  TOBRAMYCIN: SIGNIFICANT CHANGE UP
-  TRIMETHOPRIM/SULFAMETHOXAZOLE: SIGNIFICANT CHANGE UP
ALBUMIN SERPL ELPH-MCNC: 3 G/DL — LOW (ref 3.5–5.2)
ALBUMIN SERPL ELPH-MCNC: 3.3 G/DL — LOW (ref 3.5–5.2)
ALBUMIN SERPL ELPH-MCNC: 3.4 G/DL — LOW (ref 3.5–5.2)
ALP SERPL-CCNC: 104 U/L — SIGNIFICANT CHANGE UP (ref 30–115)
ALP SERPL-CCNC: 114 U/L — SIGNIFICANT CHANGE UP (ref 30–115)
ALP SERPL-CCNC: 115 U/L — SIGNIFICANT CHANGE UP (ref 30–115)
ALT FLD-CCNC: 118 U/L — HIGH (ref 0–41)
ALT FLD-CCNC: 120 U/L — HIGH (ref 0–41)
ALT FLD-CCNC: 63 U/L — HIGH (ref 0–41)
ANION GAP SERPL CALC-SCNC: 10 MMOL/L — SIGNIFICANT CHANGE UP (ref 7–14)
ANION GAP SERPL CALC-SCNC: 10 MMOL/L — SIGNIFICANT CHANGE UP (ref 7–14)
ANION GAP SERPL CALC-SCNC: 4 MMOL/L — LOW (ref 7–14)
ANION GAP SERPL CALC-SCNC: 9 MMOL/L — SIGNIFICANT CHANGE UP (ref 7–14)
APPEARANCE UR: ABNORMAL
APTT BLD: 41.7 SEC — HIGH (ref 27–39.2)
AST SERPL-CCNC: 103 U/L — HIGH (ref 0–41)
AST SERPL-CCNC: 134 U/L — HIGH (ref 0–41)
AST SERPL-CCNC: 50 U/L — HIGH (ref 0–41)
BACTERIA # UR AUTO: ABNORMAL
BASOPHILS # BLD AUTO: 0.06 K/UL — SIGNIFICANT CHANGE UP (ref 0–0.2)
BASOPHILS NFR BLD AUTO: 0.8 % — SIGNIFICANT CHANGE UP (ref 0–1)
BILIRUB SERPL-MCNC: 0.3 MG/DL — SIGNIFICANT CHANGE UP (ref 0.2–1.2)
BILIRUB SERPL-MCNC: 0.3 MG/DL — SIGNIFICANT CHANGE UP (ref 0.2–1.2)
BILIRUB SERPL-MCNC: 0.4 MG/DL — SIGNIFICANT CHANGE UP (ref 0.2–1.2)
BILIRUB UR-MCNC: ABNORMAL
BLD GP AB SCN SERPL QL: SIGNIFICANT CHANGE UP
BUN SERPL-MCNC: 10 MG/DL — SIGNIFICANT CHANGE UP (ref 10–20)
BUN SERPL-MCNC: 10 MG/DL — SIGNIFICANT CHANGE UP (ref 10–20)
BUN SERPL-MCNC: 14 MG/DL — SIGNIFICANT CHANGE UP (ref 10–20)
BUN SERPL-MCNC: 20 MG/DL — SIGNIFICANT CHANGE UP (ref 10–20)
CALCIUM SERPL-MCNC: 8.1 MG/DL — LOW (ref 8.4–10.5)
CALCIUM SERPL-MCNC: 8.3 MG/DL — LOW (ref 8.4–10.5)
CALCIUM SERPL-MCNC: 8.4 MG/DL — SIGNIFICANT CHANGE UP (ref 8.4–10.5)
CALCIUM SERPL-MCNC: 8.4 MG/DL — SIGNIFICANT CHANGE UP (ref 8.4–10.5)
CHLORIDE SERPL-SCNC: 109 MMOL/L — SIGNIFICANT CHANGE UP (ref 98–110)
CHLORIDE SERPL-SCNC: 110 MMOL/L — SIGNIFICANT CHANGE UP (ref 98–110)
CHLORIDE SERPL-SCNC: 111 MMOL/L — HIGH (ref 98–110)
CHLORIDE SERPL-SCNC: 111 MMOL/L — HIGH (ref 98–110)
CO2 SERPL-SCNC: 23 MMOL/L — SIGNIFICANT CHANGE UP (ref 17–32)
CO2 SERPL-SCNC: 23 MMOL/L — SIGNIFICANT CHANGE UP (ref 17–32)
CO2 SERPL-SCNC: 25 MMOL/L — SIGNIFICANT CHANGE UP (ref 17–32)
CO2 SERPL-SCNC: 29 MMOL/L — SIGNIFICANT CHANGE UP (ref 17–32)
COLOR SPEC: ABNORMAL
CREAT SERPL-MCNC: 0.8 MG/DL — SIGNIFICANT CHANGE UP (ref 0.7–1.5)
CULTURE RESULTS: SIGNIFICANT CHANGE UP
DIFF PNL FLD: ABNORMAL
EGFR: 99 ML/MIN/1.73M2 — SIGNIFICANT CHANGE UP
EOSINOPHIL # BLD AUTO: 0.75 K/UL — HIGH (ref 0–0.7)
EOSINOPHIL NFR BLD AUTO: 9.4 % — HIGH (ref 0–8)
EPI CELLS # UR: ABNORMAL /HPF
GLUCOSE BLDC GLUCOMTR-MCNC: 89 MG/DL — SIGNIFICANT CHANGE UP (ref 70–99)
GLUCOSE SERPL-MCNC: 87 MG/DL — SIGNIFICANT CHANGE UP (ref 70–99)
GLUCOSE SERPL-MCNC: 90 MG/DL — SIGNIFICANT CHANGE UP (ref 70–99)
GLUCOSE SERPL-MCNC: 91 MG/DL — SIGNIFICANT CHANGE UP (ref 70–99)
GLUCOSE SERPL-MCNC: 93 MG/DL — SIGNIFICANT CHANGE UP (ref 70–99)
GLUCOSE UR QL: 100 MG/DL
HCT VFR BLD CALC: 29.2 % — LOW (ref 42–52)
HCT VFR BLD CALC: 30.6 % — LOW (ref 42–52)
HCT VFR BLD CALC: 32.6 % — LOW (ref 42–52)
HCT VFR BLD CALC: 34.5 % — LOW (ref 42–52)
HCT VFR BLD CALC: 36.5 % — LOW (ref 42–52)
HCV AB S/CO SERPL IA: 0.04 COI — SIGNIFICANT CHANGE UP
HCV AB SERPL-IMP: SIGNIFICANT CHANGE UP
HGB BLD-MCNC: 10.5 G/DL — LOW (ref 14–18)
HGB BLD-MCNC: 11 G/DL — LOW (ref 14–18)
HGB BLD-MCNC: 11.7 G/DL — LOW (ref 14–18)
HGB BLD-MCNC: 9.4 G/DL — LOW (ref 14–18)
HGB BLD-MCNC: 9.9 G/DL — LOW (ref 14–18)
IMM GRANULOCYTES NFR BLD AUTO: 0.1 % — SIGNIFICANT CHANGE UP (ref 0.1–0.3)
INR BLD: 1.73 RATIO — HIGH (ref 0.65–1.3)
KETONES UR-MCNC: 15
LACTATE SERPL-SCNC: 1.7 MMOL/L — SIGNIFICANT CHANGE UP (ref 0.7–2)
LEUKOCYTE ESTERASE UR-ACNC: ABNORMAL
LYMPHOCYTES # BLD AUTO: 3.27 K/UL — SIGNIFICANT CHANGE UP (ref 1.2–3.4)
LYMPHOCYTES # BLD AUTO: 40.9 % — SIGNIFICANT CHANGE UP (ref 20.5–51.1)
MAGNESIUM SERPL-MCNC: 1.9 MG/DL — SIGNIFICANT CHANGE UP (ref 1.8–2.4)
MCHC RBC-ENTMCNC: 29.8 PG — SIGNIFICANT CHANGE UP (ref 27–31)
MCHC RBC-ENTMCNC: 29.9 PG — SIGNIFICANT CHANGE UP (ref 27–31)
MCHC RBC-ENTMCNC: 30 PG — SIGNIFICANT CHANGE UP (ref 27–31)
MCHC RBC-ENTMCNC: 30.1 PG — SIGNIFICANT CHANGE UP (ref 27–31)
MCHC RBC-ENTMCNC: 30.2 PG — SIGNIFICANT CHANGE UP (ref 27–31)
MCHC RBC-ENTMCNC: 31.9 G/DL — LOW (ref 32–37)
MCHC RBC-ENTMCNC: 32.1 G/DL — SIGNIFICANT CHANGE UP (ref 32–37)
MCHC RBC-ENTMCNC: 32.2 G/DL — SIGNIFICANT CHANGE UP (ref 32–37)
MCHC RBC-ENTMCNC: 32.2 G/DL — SIGNIFICANT CHANGE UP (ref 32–37)
MCHC RBC-ENTMCNC: 32.4 G/DL — SIGNIFICANT CHANGE UP (ref 32–37)
MCV RBC AUTO: 92.2 FL — SIGNIFICANT CHANGE UP (ref 80–94)
MCV RBC AUTO: 93 FL — SIGNIFICANT CHANGE UP (ref 80–94)
MCV RBC AUTO: 93.1 FL — SIGNIFICANT CHANGE UP (ref 80–94)
MCV RBC AUTO: 93.8 FL — SIGNIFICANT CHANGE UP (ref 80–94)
MCV RBC AUTO: 94.8 FL — HIGH (ref 80–94)
METHOD TYPE: SIGNIFICANT CHANGE UP
MONOCYTES # BLD AUTO: 0.77 K/UL — HIGH (ref 0.1–0.6)
MONOCYTES NFR BLD AUTO: 9.6 % — HIGH (ref 1.7–9.3)
NEUTROPHILS # BLD AUTO: 3.13 K/UL — SIGNIFICANT CHANGE UP (ref 1.4–6.5)
NEUTROPHILS NFR BLD AUTO: 39.2 % — LOW (ref 42.2–75.2)
NITRITE UR-MCNC: POSITIVE
NRBC # BLD: 0 /100 WBCS — SIGNIFICANT CHANGE UP (ref 0–0)
ORGANISM # SPEC MICROSCOPIC CNT: SIGNIFICANT CHANGE UP
ORGANISM # SPEC MICROSCOPIC CNT: SIGNIFICANT CHANGE UP
PH UR: 7 — SIGNIFICANT CHANGE UP (ref 5–8)
PHOSPHATE SERPL-MCNC: 3.1 MG/DL — SIGNIFICANT CHANGE UP (ref 2.1–4.9)
PLATELET # BLD AUTO: 218 K/UL — SIGNIFICANT CHANGE UP (ref 130–400)
PLATELET # BLD AUTO: 221 K/UL — SIGNIFICANT CHANGE UP (ref 130–400)
PLATELET # BLD AUTO: 228 K/UL — SIGNIFICANT CHANGE UP (ref 130–400)
PLATELET # BLD AUTO: 235 K/UL — SIGNIFICANT CHANGE UP (ref 130–400)
PLATELET # BLD AUTO: 245 K/UL — SIGNIFICANT CHANGE UP (ref 130–400)
POTASSIUM SERPL-MCNC: 3.1 MMOL/L — LOW (ref 3.5–5)
POTASSIUM SERPL-MCNC: 3.3 MMOL/L — LOW (ref 3.5–5)
POTASSIUM SERPL-MCNC: 4.1 MMOL/L — SIGNIFICANT CHANGE UP (ref 3.5–5)
POTASSIUM SERPL-MCNC: 4.4 MMOL/L — SIGNIFICANT CHANGE UP (ref 3.5–5)
POTASSIUM SERPL-SCNC: 3.1 MMOL/L — LOW (ref 3.5–5)
POTASSIUM SERPL-SCNC: 3.3 MMOL/L — LOW (ref 3.5–5)
POTASSIUM SERPL-SCNC: 4.1 MMOL/L — SIGNIFICANT CHANGE UP (ref 3.5–5)
POTASSIUM SERPL-SCNC: 4.4 MMOL/L — SIGNIFICANT CHANGE UP (ref 3.5–5)
PROT SERPL-MCNC: 5 G/DL — LOW (ref 6–8)
PROT SERPL-MCNC: 5.3 G/DL — LOW (ref 6–8)
PROT SERPL-MCNC: 5.5 G/DL — LOW (ref 6–8)
PROT UR-MCNC: >=300 MG/DL
PROTHROM AB SERPL-ACNC: 20.1 SEC — HIGH (ref 9.95–12.87)
RBC # BLD: 3.14 M/UL — LOW (ref 4.7–6.1)
RBC # BLD: 3.32 M/UL — LOW (ref 4.7–6.1)
RBC # BLD: 3.5 M/UL — LOW (ref 4.7–6.1)
RBC # BLD: 3.64 M/UL — LOW (ref 4.7–6.1)
RBC # BLD: 3.89 M/UL — LOW (ref 4.7–6.1)
RBC # FLD: 12.7 % — SIGNIFICANT CHANGE UP (ref 11.5–14.5)
RBC # FLD: 13 % — SIGNIFICANT CHANGE UP (ref 11.5–14.5)
RBC # FLD: 13.1 % — SIGNIFICANT CHANGE UP (ref 11.5–14.5)
RBC CASTS # UR COMP ASSIST: >50 /HPF
SARS-COV-2 RNA SPEC QL NAA+PROBE: DETECTED
SARS-COV-2 RNA SPEC QL NAA+PROBE: SIGNIFICANT CHANGE UP
SARS-COV-2 RNA SPEC QL NAA+PROBE: SIGNIFICANT CHANGE UP
SODIUM SERPL-SCNC: 142 MMOL/L — SIGNIFICANT CHANGE UP (ref 135–146)
SODIUM SERPL-SCNC: 144 MMOL/L — SIGNIFICANT CHANGE UP (ref 135–146)
SP GR SPEC: 1.02 — SIGNIFICANT CHANGE UP (ref 1.01–1.03)
SPECIMEN SOURCE: SIGNIFICANT CHANGE UP
UROBILINOGEN FLD QL: >=8 MG/DL
WBC # BLD: 5.61 K/UL — SIGNIFICANT CHANGE UP (ref 4.8–10.8)
WBC # BLD: 6.87 K/UL — SIGNIFICANT CHANGE UP (ref 4.8–10.8)
WBC # BLD: 7.03 K/UL — SIGNIFICANT CHANGE UP (ref 4.8–10.8)
WBC # BLD: 7.2 K/UL — SIGNIFICANT CHANGE UP (ref 4.8–10.8)
WBC # BLD: 7.99 K/UL — SIGNIFICANT CHANGE UP (ref 4.8–10.8)
WBC # FLD AUTO: 5.61 K/UL — SIGNIFICANT CHANGE UP (ref 4.8–10.8)
WBC # FLD AUTO: 6.87 K/UL — SIGNIFICANT CHANGE UP (ref 4.8–10.8)
WBC # FLD AUTO: 7.03 K/UL — SIGNIFICANT CHANGE UP (ref 4.8–10.8)
WBC # FLD AUTO: 7.2 K/UL — SIGNIFICANT CHANGE UP (ref 4.8–10.8)
WBC # FLD AUTO: 7.99 K/UL — SIGNIFICANT CHANGE UP (ref 4.8–10.8)
WBC UR QL: ABNORMAL /HPF

## 2022-01-01 PROCEDURE — 99232 SBSQ HOSP IP/OBS MODERATE 35: CPT

## 2022-01-01 PROCEDURE — 99285 EMERGENCY DEPT VISIT HI MDM: CPT

## 2022-01-01 PROCEDURE — 99239 HOSP IP/OBS DSCHRG MGMT >30: CPT

## 2022-01-01 PROCEDURE — 99204 OFFICE O/P NEW MOD 45 MIN: CPT

## 2022-01-01 PROCEDURE — 99222 1ST HOSP IP/OBS MODERATE 55: CPT

## 2022-01-01 PROCEDURE — 99221 1ST HOSP IP/OBS SF/LOW 40: CPT

## 2022-01-01 PROCEDURE — 74177 CT ABD & PELVIS W/CONTRAST: CPT | Mod: 26,MA

## 2022-01-01 RX ORDER — SODIUM CHLORIDE 9 G/ML
0.9 INJECTION, SOLUTION INTRAVENOUS
Qty: 1000 | Refills: 0 | Status: ACTIVE | COMMUNITY
Start: 2022-01-01

## 2022-01-01 RX ORDER — TRAZODONE HCL 50 MG
50 TABLET ORAL AT BEDTIME
Refills: 0 | Status: DISCONTINUED | OUTPATIENT
Start: 2022-01-01 | End: 2022-01-01

## 2022-01-01 RX ORDER — SACUBITRIL AND VALSARTAN 24; 26 MG/1; MG/1
1 TABLET, FILM COATED ORAL
Refills: 0 | Status: DISCONTINUED | OUTPATIENT
Start: 2022-01-01 | End: 2022-01-01

## 2022-01-01 RX ORDER — SACUBITRIL AND VALSARTAN 24; 26 MG/1; MG/1
1 TABLET, FILM COATED ORAL
Qty: 0 | Refills: 0 | DISCHARGE

## 2022-01-01 RX ORDER — AMIODARONE HYDROCHLORIDE 200 MG/1
200 TABLET ORAL
Qty: 28 | Refills: 0 | Status: ACTIVE | COMMUNITY
Start: 2022-01-01

## 2022-01-01 RX ORDER — AMIODARONE HYDROCHLORIDE 400 MG/1
200 TABLET ORAL
Refills: 0 | Status: DISCONTINUED | OUTPATIENT
Start: 2022-01-01 | End: 2022-01-01

## 2022-01-01 RX ORDER — POTASSIUM CHLORIDE 20 MEQ
40 PACKET (EA) ORAL ONCE
Refills: 0 | Status: COMPLETED | OUTPATIENT
Start: 2022-01-01 | End: 2022-01-01

## 2022-01-01 RX ORDER — SODIUM CHLORIDE 9 MG/ML
1000 INJECTION INTRAMUSCULAR; INTRAVENOUS; SUBCUTANEOUS ONCE
Refills: 0 | Status: COMPLETED | OUTPATIENT
Start: 2022-01-01 | End: 2022-01-01

## 2022-01-01 RX ORDER — FAMOTIDINE 10 MG/ML
1 INJECTION INTRAVENOUS
Qty: 0 | Refills: 0 | DISCHARGE

## 2022-01-01 RX ORDER — ATORVASTATIN CALCIUM 80 MG/1
40 TABLET, FILM COATED ORAL AT BEDTIME
Refills: 0 | Status: DISCONTINUED | OUTPATIENT
Start: 2022-01-01 | End: 2022-01-01

## 2022-01-01 RX ORDER — CEFTRIAXONE 500 MG/1
1000 INJECTION, POWDER, FOR SOLUTION INTRAMUSCULAR; INTRAVENOUS EVERY 24 HOURS
Refills: 0 | Status: COMPLETED | OUTPATIENT
Start: 2022-01-01 | End: 2022-01-01

## 2022-01-01 RX ORDER — SODIUM CHLORIDE 9 MG/ML
1000 INJECTION INTRAMUSCULAR; INTRAVENOUS; SUBCUTANEOUS
Refills: 0 | Status: DISCONTINUED | OUTPATIENT
Start: 2022-01-01 | End: 2022-01-01

## 2022-01-01 RX ORDER — FAMOTIDINE 10 MG/ML
20 INJECTION INTRAVENOUS DAILY
Refills: 0 | Status: DISCONTINUED | OUTPATIENT
Start: 2022-01-01 | End: 2022-01-01

## 2022-01-01 RX ORDER — FLUTICASONE PROPIONATE 50 UG/1
50 SPRAY, METERED NASAL
Qty: 16 | Refills: 0 | Status: ACTIVE | COMMUNITY
Start: 2022-01-01

## 2022-01-01 RX ORDER — AMIKACIN SULFATE 250 MG/ML
1 INJECTION, SOLUTION INTRAMUSCULAR; INTRAVENOUS
Qty: 40 | Refills: 0 | Status: ACTIVE | COMMUNITY
Start: 2022-01-01

## 2022-01-01 RX ORDER — FINASTERIDE 5 MG/1
5 TABLET, FILM COATED ORAL DAILY
Refills: 0 | Status: DISCONTINUED | OUTPATIENT
Start: 2022-01-01 | End: 2022-01-01

## 2022-01-01 RX ORDER — LEVETIRACETAM 500 MG/1
500 TABLET, FILM COATED ORAL
Qty: 28 | Refills: 0 | Status: ACTIVE | COMMUNITY
Start: 2022-01-01

## 2022-01-01 RX ORDER — MEROPENEM 1 G/30ML
1 INJECTION INTRAVENOUS
Qty: 20 | Refills: 0 | Status: ACTIVE | COMMUNITY
Start: 2022-01-01

## 2022-01-01 RX ORDER — ZOLPIDEM TARTRATE 10 MG/1
10 TABLET ORAL
Qty: 30 | Refills: 0 | Status: ACTIVE | COMMUNITY
Start: 2022-01-01

## 2022-01-01 RX ORDER — APIXABAN 2.5 MG/1
5 TABLET, FILM COATED ORAL EVERY 12 HOURS
Refills: 0 | Status: DISCONTINUED | OUTPATIENT
Start: 2022-01-01 | End: 2022-01-01

## 2022-01-01 RX ORDER — LEVETIRACETAM 250 MG/1
750 TABLET, FILM COATED ORAL ONCE
Refills: 0 | Status: COMPLETED | OUTPATIENT
Start: 2022-01-01 | End: 2022-01-01

## 2022-01-01 RX ORDER — ERTAPENEM SODIUM 1 G/1
1 INJECTION, POWDER, LYOPHILIZED, FOR SOLUTION INTRAMUSCULAR; INTRAVENOUS
Qty: 7 | Refills: 0 | Status: ACTIVE | COMMUNITY
Start: 2022-01-01

## 2022-01-01 RX ORDER — FINASTERIDE 5 MG/1
1 TABLET, FILM COATED ORAL
Qty: 0 | Refills: 0 | DISCHARGE

## 2022-01-01 RX ORDER — DEXTROSE MONOHYDRATE 25000 MG/500ML
5 INJECTION, SOLUTION INTRAVENOUS
Qty: 300 | Refills: 0 | Status: ACTIVE | COMMUNITY
Start: 2022-01-01

## 2022-01-01 RX ORDER — CEFPODOXIME PROXETIL 100 MG
1 TABLET ORAL
Qty: 6 | Refills: 0
Start: 2022-01-01 | End: 2022-01-01

## 2022-01-01 RX ORDER — FOLIC ACID 0.8 MG
1 TABLET ORAL DAILY
Refills: 0 | Status: DISCONTINUED | OUTPATIENT
Start: 2022-01-01 | End: 2022-01-01

## 2022-01-01 RX ORDER — SODIUM CHLORIDE 9 MG/ML
500 INJECTION INTRAMUSCULAR; INTRAVENOUS; SUBCUTANEOUS ONCE
Refills: 0 | Status: COMPLETED | OUTPATIENT
Start: 2022-01-01 | End: 2022-01-01

## 2022-01-01 RX ORDER — CEFPODOXIME PROXETIL 100 MG
1 TABLET ORAL
Qty: 20 | Refills: 0
Start: 2022-01-01 | End: 2022-01-01

## 2022-01-01 RX ORDER — CHLORHEXIDINE GLUCONATE 213 G/1000ML
1 SOLUTION TOPICAL
Refills: 0 | Status: DISCONTINUED | OUTPATIENT
Start: 2022-01-01 | End: 2022-01-01

## 2022-01-01 RX ORDER — APIXABAN 2.5 MG/1
5 TABLET, FILM COATED ORAL ONCE
Refills: 0 | Status: COMPLETED | OUTPATIENT
Start: 2022-01-01 | End: 2022-01-01

## 2022-01-01 RX ORDER — LEVETIRACETAM 250 MG/1
1500 TABLET, FILM COATED ORAL
Refills: 0 | Status: DISCONTINUED | OUTPATIENT
Start: 2022-01-01 | End: 2022-01-01

## 2022-01-01 RX ORDER — TAMSULOSIN HYDROCHLORIDE 0.4 MG/1
1 CAPSULE ORAL
Qty: 0 | Refills: 0 | DISCHARGE

## 2022-01-01 RX ORDER — DONEPEZIL HYDROCHLORIDE 10 MG/1
10 TABLET, FILM COATED ORAL AT BEDTIME
Refills: 0 | Status: DISCONTINUED | OUTPATIENT
Start: 2022-01-01 | End: 2022-01-01

## 2022-01-01 RX ORDER — AMIODARONE HYDROCHLORIDE 400 MG/1
1 TABLET ORAL
Qty: 0 | Refills: 0 | DISCHARGE

## 2022-01-01 RX ORDER — METOPROLOL SUCCINATE 50 MG/1
50 TABLET, EXTENDED RELEASE ORAL
Qty: 14 | Refills: 0 | Status: ACTIVE | COMMUNITY
Start: 2022-01-01

## 2022-01-01 RX ORDER — ATOGEPANT 10 MG/1
10 TABLET ORAL
Qty: 14 | Refills: 0 | Status: ACTIVE | COMMUNITY
Start: 2022-01-01

## 2022-01-01 RX ORDER — CEFOXITIN SODIUM 1 G/1
1 POWDER, FOR SOLUTION INTRAVENOUS
Qty: 6 | Refills: 0 | Status: ACTIVE | COMMUNITY
Start: 2022-01-01

## 2022-01-01 RX ORDER — FREMANEZUMAB-VFRM 225 MG/1.5ML
225 INJECTION SUBCUTANEOUS
Qty: 2 | Refills: 0 | Status: ACTIVE | COMMUNITY
Start: 2022-01-01

## 2022-01-01 RX ORDER — DIAZEPAM 5 MG/1
5 TABLET ORAL
Qty: 28 | Refills: 0 | Status: ACTIVE | COMMUNITY
Start: 2022-01-01

## 2022-01-01 RX ORDER — TAMSULOSIN HYDROCHLORIDE 0.4 MG/1
0.4 CAPSULE ORAL AT BEDTIME
Refills: 0 | Status: DISCONTINUED | OUTPATIENT
Start: 2022-01-01 | End: 2022-01-01

## 2022-01-01 RX ORDER — CEFTRIAXONE 500 MG/1
1000 INJECTION, POWDER, FOR SOLUTION INTRAMUSCULAR; INTRAVENOUS ONCE
Refills: 0 | Status: COMPLETED | OUTPATIENT
Start: 2022-01-01 | End: 2022-01-01

## 2022-01-01 RX ORDER — METOPROLOL TARTRATE 50 MG
1 TABLET ORAL
Qty: 0 | Refills: 0 | DISCHARGE

## 2022-01-01 RX ORDER — CEFTRIAXONE 500 MG/1
1000 INJECTION, POWDER, FOR SOLUTION INTRAMUSCULAR; INTRAVENOUS EVERY 24 HOURS
Refills: 0 | Status: DISCONTINUED | OUTPATIENT
Start: 2022-01-01 | End: 2022-01-01

## 2022-01-01 RX ORDER — DONEPEZIL HYDROCHLORIDE 10 MG/1
2 TABLET, FILM COATED ORAL
Qty: 0 | Refills: 0 | DISCHARGE

## 2022-01-01 RX ORDER — SACUBITRIL AND VALSARTAN 24; 26 MG/1; MG/1
24-26 TABLET, FILM COATED ORAL
Qty: 28 | Refills: 0 | Status: ACTIVE | COMMUNITY
Start: 2022-01-01

## 2022-01-01 RX ORDER — METOPROLOL TARTRATE 50 MG
50 TABLET ORAL DAILY
Refills: 0 | Status: DISCONTINUED | OUTPATIENT
Start: 2022-01-01 | End: 2022-01-01

## 2022-01-01 RX ORDER — FAMOTIDINE 20 MG/1
20 TABLET, FILM COATED ORAL
Qty: 14 | Refills: 0 | Status: ACTIVE | COMMUNITY
Start: 2022-01-01

## 2022-01-01 RX ORDER — NAPROXEN 375 MG/1
375 TABLET ORAL
Qty: 30 | Refills: 0 | Status: ACTIVE | COMMUNITY
Start: 2022-01-01

## 2022-01-01 RX ORDER — LACOSAMIDE 100 MG/1
100 TABLET ORAL
Qty: 60 | Refills: 0 | Status: ACTIVE | COMMUNITY
Start: 2022-01-01

## 2022-01-01 RX ORDER — LEVETIRACETAM 250 MG/1
1000 TABLET, FILM COATED ORAL AT BEDTIME
Refills: 0 | Status: DISCONTINUED | OUTPATIENT
Start: 2022-01-01 | End: 2022-01-01

## 2022-01-01 RX ORDER — LEVETIRACETAM 250 MG/1
750 TABLET, FILM COATED ORAL
Refills: 0 | Status: DISCONTINUED | OUTPATIENT
Start: 2022-01-01 | End: 2022-01-01

## 2022-01-01 RX ORDER — SODIUM CHLORIDE 9 MG/ML
1000 INJECTION, SOLUTION INTRAVENOUS ONCE
Refills: 0 | Status: COMPLETED | OUTPATIENT
Start: 2022-01-01 | End: 2022-01-01

## 2022-01-01 RX ORDER — SERTRALINE 25 MG/1
25 TABLET, FILM COATED ORAL
Qty: 14 | Refills: 0 | Status: ACTIVE | COMMUNITY
Start: 2022-01-01

## 2022-01-01 RX ADMIN — AMIODARONE HYDROCHLORIDE 200 MILLIGRAM(S): 400 TABLET ORAL at 05:44

## 2022-01-01 RX ADMIN — Medication 100 MILLIGRAM(S): at 06:26

## 2022-01-01 RX ADMIN — Medication 200 MILLIGRAM(S): at 02:31

## 2022-01-01 RX ADMIN — Medication 50 MILLIGRAM(S): at 00:34

## 2022-01-01 RX ADMIN — AMIODARONE HYDROCHLORIDE 200 MILLIGRAM(S): 400 TABLET ORAL at 06:26

## 2022-01-01 RX ADMIN — SODIUM CHLORIDE 500 MILLILITER(S): 9 INJECTION INTRAMUSCULAR; INTRAVENOUS; SUBCUTANEOUS at 20:50

## 2022-01-01 RX ADMIN — LEVETIRACETAM 1500 MILLIGRAM(S): 250 TABLET, FILM COATED ORAL at 05:44

## 2022-01-01 RX ADMIN — AMIODARONE HYDROCHLORIDE 200 MILLIGRAM(S): 400 TABLET ORAL at 05:24

## 2022-01-01 RX ADMIN — DONEPEZIL HYDROCHLORIDE 10 MILLIGRAM(S): 10 TABLET, FILM COATED ORAL at 21:28

## 2022-01-01 RX ADMIN — CEFTRIAXONE 100 MILLIGRAM(S): 500 INJECTION, POWDER, FOR SOLUTION INTRAMUSCULAR; INTRAVENOUS at 21:29

## 2022-01-01 RX ADMIN — Medication 1 MILLIGRAM(S): at 11:38

## 2022-01-01 RX ADMIN — APIXABAN 5 MILLIGRAM(S): 2.5 TABLET, FILM COATED ORAL at 05:48

## 2022-01-01 RX ADMIN — CEFTRIAXONE 100 MILLIGRAM(S): 500 INJECTION, POWDER, FOR SOLUTION INTRAMUSCULAR; INTRAVENOUS at 00:24

## 2022-01-01 RX ADMIN — ATORVASTATIN CALCIUM 40 MILLIGRAM(S): 80 TABLET, FILM COATED ORAL at 21:17

## 2022-01-01 RX ADMIN — Medication 100 MILLIGRAM(S): at 05:44

## 2022-01-01 RX ADMIN — FINASTERIDE 5 MILLIGRAM(S): 5 TABLET, FILM COATED ORAL at 11:36

## 2022-01-01 RX ADMIN — APIXABAN 5 MILLIGRAM(S): 2.5 TABLET, FILM COATED ORAL at 17:08

## 2022-01-01 RX ADMIN — Medication 100 MILLIGRAM(S): at 17:07

## 2022-01-01 RX ADMIN — FAMOTIDINE 20 MILLIGRAM(S): 10 INJECTION INTRAVENOUS at 22:31

## 2022-01-01 RX ADMIN — LEVETIRACETAM 1500 MILLIGRAM(S): 250 TABLET, FILM COATED ORAL at 05:26

## 2022-01-01 RX ADMIN — LEVETIRACETAM 750 MILLIGRAM(S): 250 TABLET, FILM COATED ORAL at 02:31

## 2022-01-01 RX ADMIN — Medication 1 MILLIGRAM(S): at 11:11

## 2022-01-01 RX ADMIN — DONEPEZIL HYDROCHLORIDE 10 MILLIGRAM(S): 10 TABLET, FILM COATED ORAL at 22:31

## 2022-01-01 RX ADMIN — Medication 100 MILLIGRAM(S): at 22:30

## 2022-01-01 RX ADMIN — APIXABAN 5 MILLIGRAM(S): 2.5 TABLET, FILM COATED ORAL at 06:25

## 2022-01-01 RX ADMIN — CEFTRIAXONE 100 MILLIGRAM(S): 500 INJECTION, POWDER, FOR SOLUTION INTRAMUSCULAR; INTRAVENOUS at 13:32

## 2022-01-01 RX ADMIN — Medication 100 MILLIGRAM(S): at 21:28

## 2022-01-01 RX ADMIN — Medication 100 MILLIGRAM(S): at 00:32

## 2022-01-01 RX ADMIN — Medication 100 MILLIGRAM(S): at 05:25

## 2022-01-01 RX ADMIN — Medication 1 MILLIGRAM(S): at 22:29

## 2022-01-01 RX ADMIN — SODIUM CHLORIDE 75 MILLILITER(S): 9 INJECTION INTRAMUSCULAR; INTRAVENOUS; SUBCUTANEOUS at 06:29

## 2022-01-01 RX ADMIN — ATORVASTATIN CALCIUM 40 MILLIGRAM(S): 80 TABLET, FILM COATED ORAL at 21:27

## 2022-01-01 RX ADMIN — TAMSULOSIN HYDROCHLORIDE 0.4 MILLIGRAM(S): 0.4 CAPSULE ORAL at 21:28

## 2022-01-01 RX ADMIN — FAMOTIDINE 20 MILLIGRAM(S): 10 INJECTION INTRAVENOUS at 17:46

## 2022-01-01 RX ADMIN — Medication 100 MILLIGRAM(S): at 07:31

## 2022-01-01 RX ADMIN — TAMSULOSIN HYDROCHLORIDE 0.4 MILLIGRAM(S): 0.4 CAPSULE ORAL at 22:29

## 2022-01-01 RX ADMIN — APIXABAN 5 MILLIGRAM(S): 2.5 TABLET, FILM COATED ORAL at 02:31

## 2022-01-01 RX ADMIN — Medication 1 MILLIGRAM(S): at 12:08

## 2022-01-01 RX ADMIN — ATORVASTATIN CALCIUM 40 MILLIGRAM(S): 80 TABLET, FILM COATED ORAL at 22:30

## 2022-01-01 RX ADMIN — SODIUM CHLORIDE 1000 MILLILITER(S): 9 INJECTION, SOLUTION INTRAVENOUS at 11:02

## 2022-01-01 RX ADMIN — AMIODARONE HYDROCHLORIDE 200 MILLIGRAM(S): 400 TABLET ORAL at 17:46

## 2022-01-01 RX ADMIN — FINASTERIDE 5 MILLIGRAM(S): 5 TABLET, FILM COATED ORAL at 11:11

## 2022-01-01 RX ADMIN — SODIUM CHLORIDE 75 MILLILITER(S): 9 INJECTION INTRAMUSCULAR; INTRAVENOUS; SUBCUTANEOUS at 13:32

## 2022-01-01 RX ADMIN — TAMSULOSIN HYDROCHLORIDE 0.4 MILLIGRAM(S): 0.4 CAPSULE ORAL at 21:18

## 2022-01-01 RX ADMIN — DONEPEZIL HYDROCHLORIDE 10 MILLIGRAM(S): 10 TABLET, FILM COATED ORAL at 21:17

## 2022-01-01 RX ADMIN — SODIUM CHLORIDE 75 MILLILITER(S): 9 INJECTION INTRAMUSCULAR; INTRAVENOUS; SUBCUTANEOUS at 22:31

## 2022-01-01 RX ADMIN — Medication 100 MILLIGRAM(S): at 14:11

## 2022-01-01 RX ADMIN — LEVETIRACETAM 1000 MILLIGRAM(S): 250 TABLET, FILM COATED ORAL at 21:28

## 2022-01-01 RX ADMIN — LEVETIRACETAM 750 MILLIGRAM(S): 250 TABLET, FILM COATED ORAL at 14:12

## 2022-01-01 RX ADMIN — FINASTERIDE 5 MILLIGRAM(S): 5 TABLET, FILM COATED ORAL at 13:14

## 2022-01-01 RX ADMIN — LEVETIRACETAM 1500 MILLIGRAM(S): 250 TABLET, FILM COATED ORAL at 06:25

## 2022-01-01 RX ADMIN — Medication 100 MILLIGRAM(S): at 17:46

## 2022-01-01 RX ADMIN — LEVETIRACETAM 1500 MILLIGRAM(S): 250 TABLET, FILM COATED ORAL at 22:31

## 2022-01-01 RX ADMIN — CEFTRIAXONE 100 MILLIGRAM(S): 500 INJECTION, POWDER, FOR SOLUTION INTRAMUSCULAR; INTRAVENOUS at 22:02

## 2022-01-01 RX ADMIN — SODIUM CHLORIDE 75 MILLILITER(S): 9 INJECTION INTRAMUSCULAR; INTRAVENOUS; SUBCUTANEOUS at 17:45

## 2022-01-01 RX ADMIN — TAMSULOSIN HYDROCHLORIDE 0.4 MILLIGRAM(S): 0.4 CAPSULE ORAL at 22:10

## 2022-01-01 RX ADMIN — SODIUM CHLORIDE 1000 MILLILITER(S): 9 INJECTION INTRAMUSCULAR; INTRAVENOUS; SUBCUTANEOUS at 01:21

## 2022-01-01 RX ADMIN — AMIODARONE HYDROCHLORIDE 200 MILLIGRAM(S): 400 TABLET ORAL at 17:10

## 2022-01-01 RX ADMIN — LEVETIRACETAM 1000 MILLIGRAM(S): 250 TABLET, FILM COATED ORAL at 21:18

## 2022-01-01 RX ADMIN — Medication 100 MILLIGRAM(S): at 11:11

## 2022-01-01 RX ADMIN — CEFTRIAXONE 100 MILLIGRAM(S): 500 INJECTION, POWDER, FOR SOLUTION INTRAMUSCULAR; INTRAVENOUS at 22:29

## 2022-01-01 RX ADMIN — Medication 100 MILLIGRAM(S): at 12:09

## 2022-01-01 RX ADMIN — Medication 100 MILLIGRAM(S): at 11:36

## 2022-01-01 RX ADMIN — FAMOTIDINE 20 MILLIGRAM(S): 10 INJECTION INTRAVENOUS at 13:14

## 2022-01-01 RX ADMIN — APIXABAN 5 MILLIGRAM(S): 2.5 TABLET, FILM COATED ORAL at 14:11

## 2022-01-01 RX ADMIN — FAMOTIDINE 20 MILLIGRAM(S): 10 INJECTION INTRAVENOUS at 11:11

## 2022-01-01 RX ADMIN — LEVETIRACETAM 1000 MILLIGRAM(S): 250 TABLET, FILM COATED ORAL at 22:10

## 2022-01-01 RX ADMIN — ATORVASTATIN CALCIUM 40 MILLIGRAM(S): 80 TABLET, FILM COATED ORAL at 22:11

## 2022-01-01 RX ADMIN — SODIUM CHLORIDE 75 MILLILITER(S): 9 INJECTION INTRAMUSCULAR; INTRAVENOUS; SUBCUTANEOUS at 21:29

## 2022-01-01 RX ADMIN — Medication 40 MILLIEQUIVALENT(S): at 17:10

## 2022-01-01 RX ADMIN — FINASTERIDE 5 MILLIGRAM(S): 5 TABLET, FILM COATED ORAL at 22:30

## 2022-01-01 RX ADMIN — DONEPEZIL HYDROCHLORIDE 10 MILLIGRAM(S): 10 TABLET, FILM COATED ORAL at 22:11

## 2022-01-01 RX ADMIN — CHLORHEXIDINE GLUCONATE 1 APPLICATION(S): 213 SOLUTION TOPICAL at 06:29

## 2022-01-01 RX ADMIN — APIXABAN 5 MILLIGRAM(S): 2.5 TABLET, FILM COATED ORAL at 17:10

## 2022-01-01 RX ADMIN — Medication 100 MILLIGRAM(S): at 17:10

## 2022-01-01 RX ADMIN — AMIODARONE HYDROCHLORIDE 200 MILLIGRAM(S): 400 TABLET ORAL at 17:08

## 2022-01-01 RX ADMIN — APIXABAN 5 MILLIGRAM(S): 2.5 TABLET, FILM COATED ORAL at 17:45

## 2022-01-01 RX ADMIN — SODIUM CHLORIDE 1000 MILLILITER(S): 9 INJECTION, SOLUTION INTRAVENOUS at 12:30

## 2022-04-11 NOTE — DISCHARGE NOTE ADULT - WITHDRAWAL SYMPTOMS INCLUDE; NEGATIVE MOOD, URGES TO SMOKE, AND DIFFICULTY CONCENTRATING.
Statement Selected
Initiate Treatment: Minolira ER 105mg take 1/2 tablet po qd\\nOnexton gel apply to face qhs
Plan: Recommended to apply moisturizer and sunscreen
Continue Regimen: Clindamycin gel apply to qam on face
Render In Strict Bullet Format?: No
Detail Level: Zone
Continue Regimen: Triamcinalone 0.1% cream apply to arms bid for 2 weeks then PRN
Continue Regimen: Ketoconazole 2% shampoo apply to scalp leave for 3-4 minutes then rinse biw

## 2022-11-21 PROBLEM — Z78.9 NON-SMOKER: Status: ACTIVE | Noted: 2022-01-01

## 2022-11-21 PROBLEM — I26.99 PULMONARY EMBOLISM: Status: ACTIVE | Noted: 2019-04-19

## 2022-11-21 PROBLEM — N40.0 BPH (BENIGN PROSTATIC HYPERPLASIA): Status: ACTIVE | Noted: 2019-04-19

## 2022-11-21 PROBLEM — N39.0 RECURRENT UTI: Status: ACTIVE | Noted: 2022-01-01

## 2022-11-21 PROBLEM — I62.00 SUBDURAL BLEEDING: Status: ACTIVE | Noted: 2019-04-19

## 2022-11-21 PROBLEM — Z86.718 HISTORY OF DVT (DEEP VEIN THROMBOSIS): Status: ACTIVE | Noted: 2019-04-19

## 2022-11-21 NOTE — PHYSICAL EXAM
[General Appearance - Well Nourished] : well nourished [Normal Appearance] : normal appearance [General Appearance - In No Acute Distress] : no acute distress [Abdomen Soft] : soft [Abdomen Tenderness] : non-tender [Oriented To Time, Place, And Person] : oriented to person, place, and time [Affect] : the affect was normal [FreeTextEntry1] : Pt was seen in a wheelchair.

## 2022-11-21 NOTE — ASSESSMENT
[FreeTextEntry1] : 62 y/o male with a hx of ESBL proteus UTI, CKD, and multiple other medical issues. We discussed these issues in detail and I suggested a nephrology consult regarding the CKD as that's not something urology takes care of. I asked the pt to obtain a renal and bladder US to rule out any stones as proteus infections are typically associated with stones. I also want to get an idea of the pt's prostate size and see if there's any sig incomplete emptying. If any of these issues are present including stones, sig incomplete emptying, or recurrent infections, we would also suggest adding 5-alpha reductase inhibitors such as finasteride and dutasteride to the pt's medical regime. All his questions were otherwise answered. Total time = 45 mins. \par \par The submitted E/M billing level for this visit reflects the total time spent on the day of the visit including face-to-face time spent with the patient, non-face-to-face review of medical records and relevant information, documentation, and asynchronous communication with the patient after a visit via phone, email, or patient’s EHR portal after the visit. \par The medical records reviewed are either scanned into the chart or reviewed with the patient using a patient’s electronic medical records portal for patients with records not available to [facility] via electronic transmission platforms from other institutions and labs. \par Time spend counseling and performing coordination of care was also included in determining the appropriate EM billing level.\par \par I have reviewed and verified information regarding the chief complaint and history recorded by the ancillary staff and/or the patient. I have independently reviewed and interpreted tests performed by other physicians and facilities as necessary. \par \par I have discussed with the patient differential diagnosis, reason for auxiliary tests if ordered, risks, benefits, alternatives, and complications of each form of therapy were discussed.\par

## 2022-11-21 NOTE — HISTORY OF PRESENT ILLNESS
[FreeTextEntry1] : 60 y/o male with multiple medical problems and was referred for a UTI. Pt has a hx of CKD and has had a recent ESBL proteus infection. I cannot tell, based on the chart, how he was treated. He tells me that he's been having diarrhea for a period of time now. He also voids in a diaper. Pt feels that his stream is okay and denies any sig feeling of incomplete emptying. He denies any specific voiding issues including gross hematuria or dysuria. \par \par pmhx: HTN, HLD, BPH, CKD, CAD, PVD, PE, seizures, dementia, atrial fibrillation, anxiety, osteoarthritis, major depressive disorder, reflux.\par \par allergies: NKDA

## 2022-12-09 NOTE — ED PROVIDER NOTE - CLINICAL SUMMARY MEDICAL DECISION MAKING FREE TEXT BOX
Patient evaluated for hematuria.  Found to have UTI.  Patient also found to have staghorn calculus.  Creatinine normal without any hydro.  Urology was consulted.  Patient deemed stable to be discharged home with outpatient management pt clinically stable, BP fluctuating w/ episodes of hypotension overnight. BP improving w/ IV hydration.  w/u revealing uti w/ staghorn calculus. uro consulted and no surgical intervention recommended. rec cont IV abx.

## 2022-12-09 NOTE — ED PROVIDER NOTE - PROGRESS NOTE DETAILS
Spoke to urology recommends management as an outpatient treatment for UTI CO- sign out received- pt pending transport. RN alerted me that pt has been becoming more hypotensive throughout the night/morning. pt reassessed. pt awake, alert, oriented. pt denes lightheadedness, nausea, abd pain.  labs/imaging reviewed. pt will need admission. will send rpt cbc check for drop in hgb, lactate, covid test for admission. additional fluids ordered.  spoke to urology who rec medical management w/ abx given it is staghorn calculus w/o hydro but they will see pt.  plan to admit to medicine. CO- spoke to uro PA who spoke to Dr. Laurent- no surgical intervention, rec abx to treat uti, full note to follow

## 2022-12-09 NOTE — ED PROVIDER NOTE - NSFOLLOWUPINSTRUCTIONS_ED_ALL_ED_FT
Our Emergency Department Referral Coordinators will be reaching out to you in the next 24-48 hours from 9:00am to 5:00pm with a follow up appointment. Please expect a phone call from the hospital in that time frame. If you do not receive a call or if you have any questions or concerns, you can reach them at [phone] or [phone].     Urinary Tract Infection    A urinary tract infection (UTI) is an infection of any part of the urinary tract, which includes the kidneys, ureters, bladder, and urethra. Risk factors include ignoring your need to urinate, wiping back to front if female, being an uncircumcised male, and having diabetes or a weak immune system. Symptoms include frequent urination, pain or burning with urination, foul smelling urine, cloudy urine, pain in the lower abdomen, blood in the urine, and fever. If you were prescribed an antibiotic medicine, take it as told by your health care provider. Do not stop taking the antibiotic even if you start to feel better.    SEEK IMMEDIATE MEDICAL CARE IF YOU HAVE THE FOLLOWING SYMPTOMS: severe back or abdominal pain, inability to keep fluids or medicine down, dizziness/lightheadedness, or a change in mental status.      Kidney Stones    Kidney stones (urolithiasis) are deposits that form inside your kidneys. The intense pain is caused by the stone moving through the urinary tract. When the stone moves, the ureter goes into spasm around the stone. The stone is usually passed in the urine. Symptoms include abdominal, side, or back pain, nausea, vomiting, blood in the urine, frequency with urination. Drink enough water and fluids to keep your urine clear or pale yellow. This will help you to pass the stone or stone fragments.    SEEK IMMEDIATE MEDICAL CARE IF YOU HAVE THE FOLLOWING SYMPTOMS: pain not controlled with medication, fever/chills, worsening vomiting, inability to urinate, or dizziness/lightheadedness.

## 2022-12-09 NOTE — ED PROVIDER NOTE - OBJECTIVE STATEMENT
62-year-old male past medical history of hypertension, hyperlipidemia, seizures, CHF, CKD, PE on Eliquis presents for evaluation of hematuria.  Patient has painless hematuria x3 weeks, no inciting or relieving factors.  Denies lightheadedness, shortness of breath, chest pain, weakness, numbness, dysuria, flank pain, abdominal pain.

## 2022-12-09 NOTE — ED PROVIDER NOTE - NS ED ATTENDING STATEMENT MOD
This was a shared visit with the SELENE. I reviewed and verified the documentation and independently performed the documented:

## 2022-12-09 NOTE — ED PROVIDER NOTE - NSPTACCESSSVCSAPPTDETAILS_ED_ALL_ED_FT
DX: staghorn calculus, UTI,  follow up time frame: 1 week  Treatment: nml creatinine, UA positive for UTI, ct found to have kidney stones.

## 2022-12-09 NOTE — ED ADULT NURSE NOTE - NSIMPLEMENTINTERV_GEN_ALL_ED
Implemented All Fall Risk Interventions:  [place] to call system. Call bell, personal items and telephone within reach. Instruct patient to call for assistance. Room bathroom lighting operational. Non-slip footwear when patient is off stretcher. Physically safe environment: no spills, clutter or unnecessary equipment. Stretcher in lowest position, wheels locked, appropriate side rails in place. Provide visual cue, wrist band, yellow gown, etc. Monitor gait and stability. Monitor for mental status changes and reorient to person, place, and time. Review medications for side effects contributing to fall risk. Reinforce activity limits and safety measures with patient and family.

## 2022-12-09 NOTE — ED PROVIDER NOTE - PATIENT PORTAL LINK FT
You can access the FollowMyHealth Patient Portal offered by [facility] by registering at the following website: [email or web address]. By joining [facility]’s FollowMyHealth portal, you will also be able to view your health information using other applications (apps) compatible with our system.

## 2022-12-09 NOTE — ED PROVIDER NOTE - ATTENDING APP SHARED VISIT CONTRIBUTION OF CARE
patient with hypertension, hyperlipidemia, seizures, CHF, CKD, PE on Eliquis Who is presenting with gross hematuria for 3 weeks that waxes and wanes.  Not associated with abdominal pain or flank pain.  No dysuria.  Had an outpatient ultrasound done per patient with possible kidney stone.  On exam abdomen soft nondistended.  No CVA tenderness.  Plan is to check labs and UA with imaging

## 2022-12-09 NOTE — ED PROVIDER NOTE - NSICDXPASTMEDICALHX_GEN_ALL_CORE_FT
PAST MEDICAL HISTORY:  HLD (hyperlipidemia)     HTN (hypertension)     Seizures     Unilateral inguinal hernia with obstruction and without gangrene, recurrence not specified

## 2022-12-09 NOTE — ED PROVIDER NOTE - NS ED ROS FT
Constitutional: (-) fever  Eyes/ENT: (-) blurry vision, (-) epistaxis  Cardiovascular: (-) chest pain, (-) syncope  Respiratory: (-) cough, (-) shortness of breath  Gastrointestinal: (-) vomiting, (-) diarrhea  : (+) hematuria  Musculoskeletal: (-) neck pain, (-) back pain, (-) joint pain  Integumentary: (-) rash, (-) edema  Neurological: (-) headache, (-) altered mental status  Allergic/Immunologic: (-) pruritus

## 2022-12-10 NOTE — H&P ADULT - NS ATTEND AMEND GEN_ALL_CORE FT
Patient seen and examined at bedside; case was discussed with PA, changes were made above if needed. Agree with the H&P and A/P.

## 2022-12-10 NOTE — CONSULT NOTE ADULT - ASSESSMENT
Assessment:          Plan:     #Right renal pelvis 1.6cm staghorn calculi, no significant hydronephrosis noted.  #Hematuria secondary to UTI  Continue with abx for UTI.  Follow up urine culture.  Follow up as outpatient for further management of staghorn calculi.  No acute urological intervention.  Continue rest of care per medicine team.      Above discussed with Dr. Laurent Assessment:          Plan:     #Right renal pelvis 1.6cm staghorn calculi, no significant hydronephrosis noted.  #Hematuria secondary to UTI  Continue with abx for UTI.  Follow up urine culture.  Follow up as outpatient for further management of staghorn calculi.  No acute urological intervention.  Continue rest of care per medicine team.            Above discussed with Dr. Laurent Assessment:  Patient is a 63 y/o M with a pmhx of HTN, HLD, seizure disorder, CHF, CKD, PE (on Eliquis) who reports from [facility] for evaluation of persistent painless hematuria x 3 weeks.    Per [facility] paperwork, patient was seen by urologist who recommended CT urogram, FISH cytology and cystoscopy (name of urologist was not documented).       Plan:   #Right renal pelvis 1.6cm staghorn calculi, no significant hydronephrosis noted.  #Hematuria most likely secondary to UTI  Continue with abx for UTI.  Follow up urine culture.  Follow up as outpatient for further management of staghorn calculi and hematuria.  No acute urological intervention.  Continue rest of care per medicine team.      Above discussed with Dr. Laurent Assessment:  Patient is a 61 y/o M with a pmhx of HTN, HLD, seizure disorder, CHF, CKD, PE (on Eliquis) who reports from [facility] for evaluation of persistent painless hematuria x 3 weeks.    Per [facility] paperwork, patient was seen by urologist who recommended CT urogram, FISH cytology and cystoscopy (name of urologist was not documented).       Plan:   #Right renal pelvis 1.6cm staghorn calculi, no significant hydronephrosis noted.  #Hemorrhagic cystitis   Continue with abx for UTI.  Follow up urine culture.  Monitor H&H  Follow up as outpatient for further management of staghorn calculi and hematuria.  No acute urological intervention.  Continue rest of care per medicine team.      Above discussed with Dr. Laurent

## 2022-12-10 NOTE — H&P ADULT - NSHPLABSRESULTS_GEN_ALL_CORE
LABS:               11.0   7.20  )-----------( 218      ( 10 Dec 2022 10:48 )             34.5         144  |  111<H>  |  20  ----------------------------<  93  4.4   |  29  |  0.8    Ca    8.4      09 Dec 2022 20:30    TPro  5.5<L>  /  Alb  3.4<L>  /  TBili  0.3  /  DBili  x   /  AST  50<H>  /  ALT  63<H>  /  AlkPhos  115  12    LIVER FUNCTIONS - ( 09 Dec 2022 20:30 )  Alb: 3.4 g/dL / Pro: 5.5 g/dL / ALK PHOS: 115 U/L / ALT: 63 U/L / AST: 50 U/L / GGT: x           PT/INR - ( 09 Dec 2022 20:30 )   PT: 20.10 sec;   INR: 1.73 ratio         PTT - ( 09 Dec 2022 20:30 )  PTT:41.7 sec  Urinalysis Basic - ( 09 Dec 2022 21:18 )    Color: Red / Appearance: Cloudy / S.020 / pH: x  Gluc: x / Ketone: 15  / Bili: Large / Urobili: >=8.0 mg/dL   Blood: x / Protein: >=300 mg/dL / Nitrite: Positive   Leuk Esterase: Large / RBC: >50 /HPF / WBC 26-50 /HPF   Sq Epi: x / Non Sq Epi: Occasional /HPF / Bacteria: Moderate  +++++++++++++++++++++++++++++++++++++++++++++++++++++++++++++++++++++++  < from: CT Abdomen and Pelvis w/ IV Cont (22 @ 22:31) >    IMPRESSION:  1.  Rightrenal pelvis 1.6 cm staghorn calculus with surrounding   periurethral fat stranding/inflammatory changes. No significant   hydronephrosis.  2.  Urinary bladder wall thickening with mucosal hyperenhancement and   trace perivesicular fat stranding. Findings may reflect cystitis in the   appropriate clinical setting. Bladder calculi are present.  3.  Indeterminant right hepatic lobe lesion for which outpatient MRI with   liver protocol is recommended.    --- End of Report ---    JONA MONDRAGON MD; Resident Radiologist  This document has been electronically signed.  NIKKY WHYTE MD; Attending Radiologist  This document has been electronically signed. Dec 10 2022 12:19AM    < end of copied text > LABS:               11.0   7.20  )-----------( 218      ( 10 Dec 2022 10:48 )             34.5         144  |  111<H>  |  20  ----------------------------<  93  4.4   |  29  |  0.8    Ca    8.4      09 Dec 2022 20:30    TPro  5.5<L>  /  Alb  3.4<L>  /  TBili  0.3  /  DBili  x   /  AST  50<H>  /  ALT  63<H>  /  AlkPhos  115  12    LIVER FUNCTIONS - ( 09 Dec 2022 20:30 )  Alb: 3.4 g/dL / Pro: 5.5 g/dL / ALK PHOS: 115 U/L / ALT: 63 U/L / AST: 50 U/L / GGT: x           PT/INR - ( 09 Dec 2022 20:30 )   PT: 20.10 sec;   INR: 1.73 ratio         PTT - ( 09 Dec 2022 20:30 )  PTT:41.7 sec  Urinalysis Basic - ( 09 Dec 2022 21:18 )    Color: Red / Appearance: Cloudy / S.020 / pH: x  Gluc: x / Ketone: 15  / Bili: Large / Urobili: >=8.0 mg/dL   Blood: x / Protein: >=300 mg/dL / Nitrite: Positive   Leuk Esterase: Large / RBC: >50 /HPF / WBC 26-50 /HPF   Sq Epi: x / Non Sq Epi: Occasional /HPF / Bacteria: Moderate  +++++++++++++++++++++++++++++++++++++++++++++++++++++++++++++++++++++++  < from: CT Abdomen and Pelvis w/ IV Cont (22 @ 22:31) >    IMPRESSION:  1.  Rightrenal pelvis 1.6 cm staghorn calculus with surrounding   periurethral fat stranding/inflammatory changes. No significant   hydronephrosis.  2.  Urinary bladder wall thickening with mucosal hyperenhancement and   trace perivesicular fat stranding. Findings may reflect cystitis in the   appropriate clinical setting. Bladder calculi are present.  3.  Indeterminant right hepatic lobe lesion for which outpatient MRI with   liver protocol is recommended.  -Chronic right rib fractures. Chronic T9 vertebral   body compression deformity and chronic L3 transverse process fracture.     < from: CT Abdomen and Pelvis w/ IV Cont (22 @ 22:31) >    < end of copied text >    --- End of Report ---  JONA MONDRAGON MD; Resident Radiologist  This document has been electronically signed.  NIKKY WHYTE MD; Attending Radiologist  This document has been electronically signed. Dec 10 2022 12:19AM    < end of copied text >

## 2022-12-10 NOTE — H&P ADULT - NSICDXPASTMEDICALHX_GEN_ALL_CORE_FT
PAST MEDICAL HISTORY:  Chronic CHF     CKD (chronic kidney disease)     HLD (hyperlipidemia)     HTN (hypertension)     Pulmonary embolism     Seizures     Unilateral inguinal hernia with obstruction and without gangrene, recurrence not specified

## 2022-12-10 NOTE — H&P ADULT - HISTORY OF PRESENT ILLNESS
Patient is a 63 y/o M with a pmhx of HTN, HLD, seizure disorder, CHF, CKD, PE in the past (on Eliquis) who reports from [facility] for evaluation of persistent painless hematuria x 3 weeks. Denies any similar episodes in the past. Cannot recall if seen by a urologist in the past. Otherwise patient reports he feels well, in his usual state of health. No other complaints at this time. Denies any chest pain, SOB, leg/calf pain, abdominal pain, flank pain, dizziness, dysuria, lightheadedness.     In the ER, patient was noted to be hypotensive 82/51. Bolus was given. CT w/ IV contrast was ordered which showed a right renal pelvis 1.6cm staghorn calculi, no significant hydronephrosis noted.

## 2022-12-10 NOTE — ED ADULT NURSE REASSESSMENT NOTE - NS ED NURSE REASSESS COMMENT FT1
Pt was given Eliquis, dilantin and keppra at 0230am, medications reschedule accordingly, safety in place, pt discharge to NH in stable condition, awaiting transportation
Pt assessed. Pt alert and oriented. Pt medicated with Dilantin 100 mg PO for history of seizures. Pt is waiting for transport back to nursing home. Request for transport put in 0115. at 0745  advised "we must be patient; there are others ahead of him."
Pt has been monitored and found to be hypotensive. RN spoke with RN at [facility] for Rehab. Pt does not have a history of hypotension. On the contrary pt has history of hypertension. Blood pressure at present 85/59 on left arm and 82/51 on right arm.  Heart rate 92. PO 95% on room air. Temp 97.1. ER attending advised and in to see patient. Pt will be admitted to medicine for further monitoring and treatment for UTI. Pt advised accordingly. RN at [facility] also advised.

## 2022-12-10 NOTE — CONSULT NOTE ADULT - SUBJECTIVE AND OBJECTIVE BOX
Vital Signs Last 24 Hrs  T(C): 36.2 (10 Dec 2022 10:20), Max: 37.1 (09 Dec 2022 19:23)  T(F): 97.1 (10 Dec 2022 10:20), Max: 98.7 (09 Dec 2022 19:23)  HR: 92 (10 Dec 2022 10:20) (72 - 92)  BP: 82/51 (10 Dec 2022 10:22) (82/51 - 105/62)  RR: 20 (10 Dec 2022 10:20) (18 - 20)  SpO2: 95% (10 Dec 2022 10:20) (95% - 95%)    Parameters below as of 10 Dec 2022 10:20  Patient On (Oxygen Delivery Method): room air    GENERAL: NAD, lying in bed comfortably  HEAD:  Atraumatic, Normocephalic  EYES: EOMI, PERRLA, conjunctiva and sclera clear  ENT: Moist mucous membranes  NECK: Supple, No JVD  CHEST/LUNG: Clear to auscultation bilaterally; No rales, rhonchi, wheezing, or rubs. Unlabored respirations  HEART: Regular rate and rhythm; No murmurs, rubs, or gallops  ABDOMEN: Bowel sounds present; Soft, Nontender, Nondistended. No hepatomegally    LABS:                          11.7   7.99  )-----------( 245      ( 09 Dec 2022 20:30 )             36.5         144  |  111<H>  |  20  ----------------------------<  93  4.4   |  29  |  0.8    Ca    8.4      09 Dec 2022 20:30    TPro  5.5<L>  /  Alb  3.4<L>  /  TBili  0.3  /  DBili  x   /  AST  50<H>  /  ALT  63<H>  /  AlkPhos  115      LIVER FUNCTIONS - ( 09 Dec 2022 20:30 )  Alb: 3.4 g/dL / Pro: 5.5 g/dL / ALK PHOS: 115 U/L / ALT: 63 U/L / AST: 50 U/L / GGT: x           PT/INR - ( 09 Dec 2022 20:30 )   PT: 20.10 sec;   INR: 1.73 ratio         PTT - ( 09 Dec 2022 20:30 )  PTT:41.7 sec  Urinalysis Basic - ( 09 Dec 2022 21:18 )    Color: Red / Appearance: Cloudy / S.020 / pH: x  Gluc: x / Ketone: 15  / Bili: Large / Urobili: >=8.0 mg/dL   Blood: x / Protein: >=300 mg/dL / Nitrite: Positive   Leuk Esterase: Large / RBC: >50 /HPF / WBC 26-50 /HPF   Sq Epi: x / Non Sq Epi: Occasional /HPF / Bacteria: Moderate  +++++++++++++++++++++++++++++++++++++++++++++++++++++++++++++++++++++++  < from: CT Abdomen and Pelvis w/ IV Cont (22 @ 22:31) >  IMPRESSION:  1.  Rightrenal pelvis 1.6 cm staghorn calculus with surrounding   periurethral fat stranding/inflammatory changes. No significant   hydronephrosis.  2.  Urinary bladder wall thickening with mucosal hyperenhancement and   trace perivesicular fat stranding. Findings may reflect cystitis in the   appropriate clinical setting. Bladder calculi are present.  3.  Indeterminant right hepatic lobe lesion for which outpatient MRI with   liver protocol is recommended.    --- End of Report ---      JONA MONDRAGON MD; Resident Radiologist  This document has been electronically signed.  NIKKY WHYTE MD; Attending Radiologist  This document has been electronically signed. Dec 10 2022 12:19AM    < end of copied text >  ++++++++++++++++++++++++++++++++++++++++++++++++++++++++++++++++++++++++   Patient is a 63 y/o M with a pmhx of HTN, HLD, seizure disorder, CHF, CKD, PE (on Eliquis) who reports from [facility] for evaluation of persistent painless hematuria x 3 weeks. Denies any similar episodes in the past. Cannot recall if seen by a urologist in the past. Denies any abdominal pain, flank pain, dizziness, dysuria, lightheadedness.     In the ER, patient was noted to be hypotensive 82/51. CT w/ IV contrast was ordered which showed a right renal pelvis 1.6cm staghorn calculi, no significant hydronephrosis noted.    etOH: denies.  Smoking: Former smoker x 15 years.  Drugs: None.  Allergies: NKDA.     Vital Signs Last 24 Hrs  T(C): 36.2 (10 Dec 2022 10:20), Max: 37.1 (09 Dec 2022 19:23)  T(F): 97.1 (10 Dec 2022 10:20), Max: 98.7 (09 Dec 2022 19:23)  HR: 92 (10 Dec 2022 10:20) (72 - 92)  BP: 82/51 (10 Dec 2022 10:22) (82/51 - 105/62)  RR: 20 (10 Dec 2022 10:20) (18 - 20)  SpO2: 95% (10 Dec 2022 10:20) (95% - 95%)    Parameters below as of 10 Dec 2022 10:20  Patient On (Oxygen Delivery Method): room air    GENERAL: NAD, lying in bed comfortably  HEAD:  Atraumatic, Normocephalic  EYES: EOMI, PERRLA, conjunctiva and sclera clear  ENT: Moist mucous membranes  NECK: Supple, No JVD  CHEST/LUNG: Clear to auscultation bilaterally; No rales, rhonchi, wheezing, or rubs. Unlabored respirations  HEART: Regular rate and rhythm; No murmurs, rubs, or gallops  ABDOMEN: Bowel sounds present; Soft, Nontender, Nondistended. No hepatomegally    LABS:                          11.7   7.99  )-----------( 245      ( 09 Dec 2022 20:30 )             36.5     12    144  |  111<H>  |  20  ----------------------------<  93  4.4   |  29  |  0.8    Ca    8.4      09 Dec 2022 20:30    TPro  5.5<L>  /  Alb  3.4<L>  /  TBili  0.3  /  DBili  x   /  AST  50<H>  /  ALT  63<H>  /  AlkPhos  115  12    LIVER FUNCTIONS - ( 09 Dec 2022 20:30 )  Alb: 3.4 g/dL / Pro: 5.5 g/dL / ALK PHOS: 115 U/L / ALT: 63 U/L / AST: 50 U/L / GGT: x           PT/INR - ( 09 Dec 2022 20:30 )   PT: 20.10 sec;   INR: 1.73 ratio         PTT - ( 09 Dec 2022 20:30 )  PTT:41.7 sec  Urinalysis Basic - ( 09 Dec 2022 21:18 )    Color: Red / Appearance: Cloudy / S.020 / pH: x  Gluc: x / Ketone: 15  / Bili: Large / Urobili: >=8.0 mg/dL   Blood: x / Protein: >=300 mg/dL / Nitrite: Positive   Leuk Esterase: Large / RBC: >50 /HPF / WBC 26-50 /HPF   Sq Epi: x / Non Sq Epi: Occasional /HPF / Bacteria: Moderate  +++++++++++++++++++++++++++++++++++++++++++++++++++++++++++++++++++++++  < from: CT Abdomen and Pelvis w/ IV Cont (22 @ 22:31) >  IMPRESSION:  1.  Rightrenal pelvis 1.6 cm staghorn calculus with surrounding   periurethral fat stranding/inflammatory changes. No significant   hydronephrosis.  2.  Urinary bladder wall thickening with mucosal hyperenhancement and   trace perivesicular fat stranding. Findings may reflect cystitis in the   appropriate clinical setting. Bladder calculi are present.  3.  Indeterminant right hepatic lobe lesion for which outpatient MRI with   liver protocol is recommended.    --- End of Report ---      JONA MONRDAGON MD; Resident Radiologist  This document has been electronically signed.  NIKKY WHYTE MD; Attending Radiologist  This document has been electronically signed. Dec 10 2022 12:19AM    < end of copied text >  ++++++++++++++++++++++++++++++++++++++++++++++++++++++++++++++++++++++++

## 2022-12-10 NOTE — H&P ADULT - NSHPPHYSICALEXAM_GEN_ALL_CORE
5 Vital Signs Last 24 Hrs  T(C): 36.2 (10 Dec 2022 10:20), Max: 37.1 (09 Dec 2022 19:23)  T(F): 97.1 (10 Dec 2022 10:20), Max: 98.7 (09 Dec 2022 19:23)  HR: 75 (10 Dec 2022 12:47) (72 - 92)  BP: 102/60 (10 Dec 2022 14:06) (80/52 - 105/62)  RR: 20 (10 Dec 2022 12:47) (18 - 20)  SpO2: 95% (10 Dec 2022 12:47) (95% - 95%)    Parameters below as of 10 Dec 2022 12:47  Patient On (Oxygen Delivery Method): room air    VITALS:     GENERAL: NAD, lying in bed comfortably  HEAD:  Atraumatic, Normocephalic  EYES: EOMI, PERRLA, conjunctiva and sclera clear  ENT: Moist mucous membranes  NECK: Supple, No JVD  CHEST/LUNG: Clear to auscultation bilaterally; No rales, rhonchi, wheezing, or rubs. Unlabored respirations  HEART: Regular rate and rhythm; No murmurs, rubs, or gallops  ABDOMEN: Bowel sounds present; Soft, Nontender, Nondistended. No hepatomegally  EXTREMITIES:  2+ Peripheral Pulses, brisk capillary refill. No clubbing, cyanosis, or edema  NERVOUS SYSTEM:  Alert & Oriented X3, speech clear. No deficits   MSK: FROM all 4 extremities, full and equal strength  SKIN: No rashes or lesions

## 2022-12-10 NOTE — H&P ADULT - NSHPSOCIALHISTORY_GEN_ALL_CORE
Substance Use (street drugs): ( x ) never used  (  ) other:  Tobacco Usage: ( X  ) former smoker   (   ) current smoker  (     ) pack year  Alcohol Usage: None
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
## 2022-12-10 NOTE — PATIENT PROFILE ADULT - FALL HARM RISK - HARM RISK INTERVENTIONS
Assistance with ambulation/Assistance OOB with selected safe patient handling equipment/Communicate Risk of Fall with Harm to all staff/Discuss with provider need for PT consult/Monitor gait and stability/Reinforce activity limits and safety measures with patient and family/Tailored Fall Risk Interventions/Visual Cue: Yellow wristband and red socks/Bed in lowest position, wheels locked, appropriate side rails in place/Call bell, personal items and telephone in reach/Instruct patient to call for assistance before getting out of bed or chair/Non-slip footwear when patient is out of bed/[place] to call system/Physically safe environment - no spills, clutter or unnecessary equipment/Purposeful Proactive Rounding/Room/bathroom lighting operational, light cord in reach
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
## 2022-12-12 NOTE — PHYSICAL THERAPY INITIAL EVALUATION ADULT - PERTINENT HX OF CURRENT PROBLEM, REHAB EVAL
61 y/o male admitted with diagnoses of Nephrolithiasis, Acute UTI, sent to ED from NH for evaluation of hematuria

## 2022-12-12 NOTE — PHYSICAL THERAPY INITIAL EVALUATION ADULT - GAIT DEVIATIONS NOTED, PT EVAL
posture, base of support, kenyatta , incomplete foot clearance/decreased kenyatta/decreased step length/decreased weight-shifting ability

## 2022-12-12 NOTE — PHYSICAL THERAPY INITIAL EVALUATION ADULT - LEVEL OF INDEPENDENCE: STAIR NEGOTIATION, REHAB EVAL
as patient has difficulty taking steps with rolling walker on level , however, is wheelchair bound and resides in a NH/unable to perform

## 2022-12-12 NOTE — PHYSICAL THERAPY INITIAL EVALUATION ADULT - ADDITIONAL COMMENTS
Per patient, he has been living at [facility] for the past 3 years; was able to get up and take a few steps to get to wheelchair and use bathroom
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
## 2022-12-12 NOTE — PROGRESS NOTE ADULT - SUBJECTIVE AND OBJECTIVE BOX
LIN BENTON  62y  Male      Patient is a 62y old  Male who presents with a chief complaint of     INTERVAL HPI/OVERNIGHT EVENTS:  Patient seen and examined earlier this morning; patient reports feeling well; reports his hematuria has cleared. No new complaints.         REVIEW OF SYSTEMS:  CONSTITUTIONAL: No fever, weight loss, or fatigue  EYES: No eye pain, visual disturbances, or discharge  ENMT:  No difficulty hearing, tinnitus, vertigo; No sinus or throat pain  NECK: No pain or stiffness  RESPIRATORY: No cough, wheezing, chills or hemoptysis; No shortness of breath  CARDIOVASCULAR: No chest pain, palpitations, dizziness, or leg swelling  GASTROINTESTINAL: No abdominal or epigastric pain. No nausea, vomiting, or hematemesis; No diarrhea or constipation.   GENITOURINARY: No dysuria, + frequency, +hematuria (improved)  NEUROLOGICAL: No headaches, memory loss, loss of strength, numbness, or tremors  MUSCULOSKELETAL: No joint pain or swelling; No muscle, back, or extremity pain      Vital Signs Last 24 Hrs  T(C): 36.5 (12 Dec 2022 05:14), Max: 37.1 (11 Dec 2022 21:20)  T(F): 97.7 (12 Dec 2022 05:14), Max: 98.7 (11 Dec 2022 21:20)  HR: 99 (12 Dec 2022 05:14) (98 - 99)  BP: 94/67 (12 Dec 2022 05:14) (94/67 - 118/79)  BP(mean): --  RR: 18 (11 Dec 2022 21:20) (18 - 18)  SpO2: --        PHYSICAL EXAM:  GENERAL: NAD, well-groomed, well-developed  HEAD:  Atraumatic, Normocephalic  EYES: EOMI, PERRLA, conjunctiva and sclera clear  ENMT: No tonsillar erythema, exudates, or enlargement; Moist mucous membranes, Good dentition, No lesions  NECK: Supple, No JVD, Normal thyroid  NERVOUS SYSTEM:  Alert & Oriented X3, Good concentration;   CHEST/LUNG: Clear to percussion bilaterally; No rales, rhonchi, wheezing, or rubs  HEART: Regular rate and rhythm; No murmurs, rubs, or gallops  ABDOMEN: Soft, Nontender, Nondistended; Bowel sounds present  EXTREMITIES:  2+ Peripheral Pulses, No clubbing, cyanosis, or edema      Consultant(s) Notes Reviewed:  [x ] YES  [ ] NO  Care Discussed with Consultants/Other Providers [ x] YES  [ ] NO    LAB:               LABS:                        9.9    6.87  )-----------( 235      ( 12 Dec 2022 06:58 )             30.6     -12    142  |  110  |  10  ----------------------------<  90  3.3<L>   |  23  |  0.8    Ca    8.3<L>      12 Dec 2022 06:58  Phos  3.1       Mg     1.9         TPro  5.3<L>  /  Alb  3.3<L>  /  TBili  0.4  /  DBili  x   /  AST  134<H>  /  ALT  120<H>  /  AlkPhos  114  1212            LIVER FUNCTIONS - ( 09 Dec 2022 20:30 )  Alb: 3.4 g/dL / Pro: 5.5 g/dL / ALK PHOS: 115 U/L / ALT: 63 U/L / AST: 50 U/L / GGT: x                       Drug Dosing Weight  Height (cm): 188 (10 Dec 2022 15:40)  Weight (kg): 96.4 (10 Dec 2022 15:40)  BMI (kg/m2): 27.3 (10 Dec 2022 15:40)  BSA (m2): 2.23 (10 Dec 2022 15:40)  Height (cm): 188 (12-10-22 @ 15:40)  Weight (kg): 96.4 (12-10-22 @ 15:40)  BMI (kg/m2): 27.3 (12-10-22 @ 15:40)  BSA (m2): 2.23 (12-10-22 @ 15:40)  CAPILLARY BLOOD GLUCOSE      POCT Blood Glucose.: 89 mg/dL (10 Dec 2022 16:41)    I&O's Summary    10 Dec 2022 07:01  -  11 Dec 2022 07:00  --------------------------------------------------------  IN: 0 mL / OUT: 800 mL / NET: -800 mL      Urinalysis Basic - ( 09 Dec 2022 21:18 )    Color: Red / Appearance: Cloudy / S.020 / pH: x  Gluc: x / Ketone: 15  / Bili: Large / Urobili: >=8.0 mg/dL   Blood: x / Protein: >=300 mg/dL / Nitrite: Positive   Leuk Esterase: Large / RBC: >50 /HPF / WBC 26-50 /HPF   Sq Epi: x / Non Sq Epi: Occasional /HPF / Bacteria: Moderate        RADIOLOGY & ADDITIONAL TESTS:  Imaging Personally Reviewed:  [x] YES  [ ] NO    HEALTH ISSUES - PROBLEM Dx:          MEDS:  aMIOdarone    Tablet 200 milliGRAM(s) Oral two times a day  apixaban 5 milliGRAM(s) Oral every 12 hours  atorvastatin 40 milliGRAM(s) Oral at bedtime  cefTRIAXone   IVPB 1000 milliGRAM(s) IV Intermittent every 24 hours  chlorhexidine 2% Cloths 1 Application(s) Topical <User Schedule>  donepezil 10 milliGRAM(s) Oral at bedtime  famotidine    Tablet 20 milliGRAM(s) Oral daily  finasteride 5 milliGRAM(s) Oral daily  folic acid 1 milliGRAM(s) Oral daily  levETIRAcetam 1000 milliGRAM(s) Oral at bedtime  levETIRAcetam 1500 milliGRAM(s) Oral <User Schedule>  phenytoin   Capsule 100 milliGRAM(s) Oral four times a day  sodium chloride 0.9%. 1000 milliLiter(s) IV Continuous <Continuous>  tamsulosin 0.4 milliGRAM(s) Oral at bedtime    
  LIN BENTON  62y  Male      Patient is a 62y old  Male who presents with a chief complaint of     INTERVAL HPI/OVERNIGHT EVENTS:  Patient seen and examined earlier this morning; patient reports feeling well; reports his hematuria has cleared. No new complaints. Reports of some urinary urgency and frequency.      REVIEW OF SYSTEMS:  CONSTITUTIONAL: No fever, weight loss, or fatigue  EYES: No eye pain, visual disturbances, or discharge  ENMT:  No difficulty hearing, tinnitus, vertigo; No sinus or throat pain  NECK: No pain or stiffness  RESPIRATORY: No cough, wheezing, chills or hemoptysis; No shortness of breath  CARDIOVASCULAR: No chest pain, palpitations, dizziness, or leg swelling  GASTROINTESTINAL: No abdominal or epigastric pain. No nausea, vomiting, or hematemesis; No diarrhea or constipation.   GENITOURINARY: No dysuria, + frequency, +hematuria (improved)  NEUROLOGICAL: No headaches, memory loss, loss of strength, numbness, or tremors  MUSCULOSKELETAL: No joint pain or swelling; No muscle, back, or extremity pain      T(C): 35.8 (22 @ 05:31), Max: 36.5 (12-10-22 @ 15:29)  HR: 90 (22 @ 05:31) (69 - 91)  BP: 117/70 (22 @ 05:31) (80/52 - 117/70)  RR: 16 (22 @ 05:31) (16 - 20)  SpO2: 97% (12-10-22 @ 15:29) (95% - 97%)    PHYSICAL EXAM:  GENERAL: NAD, well-groomed, well-developed  HEAD:  Atraumatic, Normocephalic  EYES: EOMI, PERRLA, conjunctiva and sclera clear  ENMT: No tonsillar erythema, exudates, or enlargement; Moist mucous membranes, Good dentition, No lesions  NECK: Supple, No JVD, Normal thyroid  NERVOUS SYSTEM:  Alert & Oriented X3, Good concentration; Motor Strength 5/5 B/L upper and lower extremities; DTRs 2+ intact and symmetric  CHEST/LUNG: Clear to percussion bilaterally; No rales, rhonchi, wheezing, or rubs  HEART: Regular rate and rhythm; No murmurs, rubs, or gallops  ABDOMEN: Soft, Nontender, Nondistended; Bowel sounds present  EXTREMITIES:  2+ Peripheral Pulses, No clubbing, cyanosis, or edema      Consultant(s) Notes Reviewed:  [x ] YES  [ ] NO  Care Discussed with Consultants/Other Providers [ x] YES  [ ] NO    LAB:                        10.5   7.03  )-----------( 228      ( 11 Dec 2022 08:13 )             32.6         144  |  109  |  14  ----------------------------<  91  4.1   |  25  |  0.8    Ca    8.4      11 Dec 2022 08:13  Phos  3.1       Mg     1.9         TPro  5.5<L>  /  Alb  3.4<L>  /  TBili  0.3  /  DBili  x   /  AST  50<H>  /  ALT  63<H>  /  AlkPhos  115      LIVER FUNCTIONS - ( 09 Dec 2022 20:30 )  Alb: 3.4 g/dL / Pro: 5.5 g/dL / ALK PHOS: 115 U/L / ALT: 63 U/L / AST: 50 U/L / GGT: x                       Drug Dosing Weight  Height (cm): 188 (10 Dec 2022 15:40)  Weight (kg): 96.4 (10 Dec 2022 15:40)  BMI (kg/m2): 27.3 (10 Dec 2022 15:40)  BSA (m2): 2.23 (10 Dec 2022 15:40)  Height (cm): 188 (12-10-22 @ 15:40)  Weight (kg): 96.4 (12-10-22 @ 15:40)  BMI (kg/m2): 27.3 (12-10-22 @ 15:40)  BSA (m2): 2.23 (12-10-22 @ 15:40)  CAPILLARY BLOOD GLUCOSE      POCT Blood Glucose.: 89 mg/dL (10 Dec 2022 16:41)    I&O's Summary    10 Dec 2022 07:01  -  11 Dec 2022 07:00  --------------------------------------------------------  IN: 0 mL / OUT: 800 mL / NET: -800 mL      Urinalysis Basic - ( 09 Dec 2022 21:18 )    Color: Red / Appearance: Cloudy / S.020 / pH: x  Gluc: x / Ketone: 15  / Bili: Large / Urobili: >=8.0 mg/dL   Blood: x / Protein: >=300 mg/dL / Nitrite: Positive   Leuk Esterase: Large / RBC: >50 /HPF / WBC 26-50 /HPF   Sq Epi: x / Non Sq Epi: Occasional /HPF / Bacteria: Moderate        RADIOLOGY & ADDITIONAL TESTS:  Imaging Personally Reviewed:  [x] YES  [ ] NO    HEALTH ISSUES - PROBLEM Dx:          MEDS:  aMIOdarone    Tablet 200 milliGRAM(s) Oral two times a day  apixaban 5 milliGRAM(s) Oral every 12 hours  atorvastatin 40 milliGRAM(s) Oral at bedtime  cefTRIAXone   IVPB 1000 milliGRAM(s) IV Intermittent every 24 hours  chlorhexidine 2% Cloths 1 Application(s) Topical <User Schedule>  donepezil 10 milliGRAM(s) Oral at bedtime  famotidine    Tablet 20 milliGRAM(s) Oral daily  finasteride 5 milliGRAM(s) Oral daily  folic acid 1 milliGRAM(s) Oral daily  levETIRAcetam 1000 milliGRAM(s) Oral at bedtime  levETIRAcetam 1500 milliGRAM(s) Oral <User Schedule>  phenytoin   Capsule 100 milliGRAM(s) Oral four times a day  sodium chloride 0.9%. 1000 milliLiter(s) IV Continuous <Continuous>  tamsulosin 0.4 milliGRAM(s) Oral at bedtime
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
## 2022-12-13 NOTE — DISCHARGE NOTE NURSING/CASE MANAGEMENT/SOCIAL WORK - NSDCPEFALRISK_GEN_ALL_CORE
For information on Fall & Injury Prevention, visit: [email or web address] OR  [email or web address] OR  https://www.cdc.gov/steadi/patient.html

## 2022-12-13 NOTE — DISCHARGE NOTE PROVIDER - PROVIDER TOKENS
PROVIDER:[TOKEN:[[id number]],FOLLOWUP:[1 week],ESTABLISHEDPATIENT:[T]] PROVIDER:[TOKEN:[[id number]],FOLLOWUP:[1 week],ESTABLISHEDPATIENT:[T]],PROVIDER:[TOKEN:[[id number]],FOLLOWUP:[1-3 days]]

## 2022-12-13 NOTE — DISCHARGE NOTE PROVIDER - NSDCFUADDAPPT_GEN_ALL_CORE_FT
Follow up with a Neurosurgeon for Chronic T9 vertebral body compression deformity and chronic L3 transverse process fracture.     Follow up with your PCP for an Outpatient MRI with liver protocol for indeterminant right hepatic lobe lesion

## 2022-12-13 NOTE — DISCHARGE NOTE PROVIDER - ATTENDING DISCHARGE PHYSICAL EXAMINATION:
Patient seen at bedside. pending placement. patient does not have any urinary complaints. will discharge on cefpodoxime 3 more days (d/w ID).   no fever spikes, no leucocytosis. bed available today. will be discharged to facility . need to follow up with urology OP and PCP.

## 2022-12-13 NOTE — DISCHARGE NOTE NURSING/CASE MANAGEMENT/SOCIAL WORK - PATIENT PORTAL LINK FT
You can access the FollowMyHealth Patient Portal offered by [facility] by registering at the following website: [email or web address]. By joining [facility]’s FollowMyHealth portal, you will also be able to view your health information using other applications (apps) compatible with our system.

## 2022-12-13 NOTE — CHART NOTE - NSCHARTNOTEFT_GEN_A_CORE
Notified by attending that patient is medically stable to be discharged to [facility]. Will send with Vantin 200 mg BID x 3 days. Patient will f/u with his PCP in one week for BP management, referral for outpatient MRI Liver, referral for urologist for staghorn calculi/hematuria, and referral to neurosurgeon for chronic T9 compression fracture. Patient is in agreement.   VSS     Vital Signs Last 24 Hrs  T(F): 97.6 (12-13-22 @ 13:00), Max: 99.1 (12-12-22 @ 20:40)  HR: 100 (12-13-22 @ 13:00) (98 - 109)  BP: 106/56 (12-13-22 @ 13:00)  RR: 18 (12-13-22 @ 13:00) (16 - 18)  SpO2: 95% (12-12-22 @ 17:44) (95% - 95%)    Will discharge patient

## 2022-12-13 NOTE — DISCHARGE NOTE PROVIDER - NSDCMRMEDTOKEN_GEN_ALL_CORE_FT
amiodarone 200 mg oral tablet: 1 tab(s) orally 2 times a day  apixaban 5 mg oral tablet: 1 tab(s) orally every 12 hours  cefpodoxime 200 mg oral tablet: 1 tab(s) orally 2 times a day   donepezil 5 mg oral tablet: 2 tab(s) orally once a day (at bedtime)  Entresto 24 mg-26 mg oral tablet: 1 tab(s) orally 2 times a day  famotidine 20 mg oral tablet: 1 tab(s) orally once a day  finasteride 5 mg oral tablet: 1 tab(s) orally once a day  Flomax 0.4 mg oral capsule: 1 cap(s) orally once a day  folic acid 1 mg oral tablet: 1 tab(s) orally once a day  levETIRAcetam 750 mg oral tablet: 2 tab(s) orally 2 times a day  Lipitor 40 mg oral tablet: 1 tab(s) orally once a day  phenytoin 200 mg oral capsule, extended release: 1 cap(s) orally 4 times a day  thiamine 100 mg oral tablet: 1 tab(s) orally once a day

## 2022-12-13 NOTE — DISCHARGE NOTE PROVIDER - NSDCCPCAREPLAN_GEN_ALL_CORE_FT
PRINCIPAL DISCHARGE DIAGNOSIS  Diagnosis: Nephrolithiasis  Assessment and Plan of Treatment:       SECONDARY DISCHARGE DIAGNOSES  Diagnosis: Acute UTI  Assessment and Plan of Treatment: #Right renal pelvis 1.6 cm staghorn calculi, no significant hydronephrosis   #Hemorrhagic cystitis   -Given IV Rocephin for UTI   -Urine culture showed gram negative rods   -Now afebrile, no white count. Will discharge on Vantin 200 mg BID x 3 days   -Follow up with outpatient Urologist for further management for staghorn calculi and hematuria

## 2022-12-13 NOTE — DISCHARGE NOTE PROVIDER - CARE PROVIDER_API CALL
PERLITA REAL  Internal Medicine  JOHNATHAN BHAT, Phys,    Phone: ([phone]  Fax: ([phone]  Established Patient  Follow Up Time: 1 week   PERLITA REAL  Internal Medicine  Robert MONK,    Phone: ()-  Fax: ()-  Established Patient  Follow Up Time: 1 week    Armen Laurent)  Urology  [address] [place] [address], [address] [place] [address]  Phone: [phone]  Fax: [phone]  Follow Up Time: 1-3 days

## 2022-12-13 NOTE — DISCHARGE NOTE PROVIDER - CARE PROVIDERS DIRECT ADDRESSES
,DirectAddress_Unknown ,DirectAddress_Unknown,[email or web address]

## 2022-12-14 PROBLEM — I26.99 OTHER PULMONARY EMBOLISM WITHOUT ACUTE COR PULMONALE: Chronic | Status: ACTIVE | Noted: 2022-01-01

## 2022-12-14 PROBLEM — N18.9 CHRONIC KIDNEY DISEASE, UNSPECIFIED: Chronic | Status: ACTIVE | Noted: 2022-01-01

## 2022-12-14 PROBLEM — I50.9 HEART FAILURE, UNSPECIFIED: Chronic | Status: ACTIVE | Noted: 2022-01-01

## 2023-01-03 ENCOUNTER — APPOINTMENT (OUTPATIENT)
Dept: NEUROSURGERY | Facility: CLINIC | Age: 63
End: 2023-01-03

## 2023-01-27 ENCOUNTER — APPOINTMENT (OUTPATIENT)
Dept: UROLOGY | Facility: CLINIC | Age: 63
End: 2023-01-27

## 2023-02-13 NOTE — ED PROVIDER NOTE - INPATIENT RESIDENT/ACP NOTIFIED DATE
I have obtained patient's history, performed physical exam and formulated management plan.   Ike Moctezuma 10-Dec-2022 14:44

## 2023-03-15 ENCOUNTER — APPOINTMENT (OUTPATIENT)
Dept: UROLOGY | Facility: CLINIC | Age: 63
End: 2023-03-15

## 2023-09-27 NOTE — DISCHARGE NOTE ADULT - NS AS DC AMI YN
Patient has the following symptoms: urine frequency, mild cramping  The symptoms have been present for about 4 days  Patient was not offered an appointment because he is asking if Dr Kim could order a lab for him to leave a sample and maybe send something to the pharmacy. Please call him back to discuss. Pharmacy has been verified.  [id number] [facility] and [address] [facility] no

## 2024-02-28 NOTE — DISCHARGE NOTE PROVIDER - NS AS DC PROVIDER CONTACT Y/N MULTI
----- Message from Hermes Mcrae MD sent at 2/28/2024 10:33 AM EST -----  Let patient know echo test shows normal heart function, however it does show a possible bicuspid aortic valve, and as such, a cardiac mri would be helpful to further assess this, lets order that if pt agreeable to do that test. thanks.       Yes